# Patient Record
Sex: MALE | Race: WHITE | Employment: OTHER | URBAN - NONMETROPOLITAN AREA
[De-identification: names, ages, dates, MRNs, and addresses within clinical notes are randomized per-mention and may not be internally consistent; named-entity substitution may affect disease eponyms.]

---

## 2018-08-06 ENCOUNTER — HOSPITAL ENCOUNTER (OUTPATIENT)
Dept: PAIN MANAGEMENT | Age: 83
Discharge: HOME | End: 2018-08-06
Payer: MEDICARE

## 2018-08-06 VITALS
TEMPERATURE: 98.5 F | HEART RATE: 89 BPM | HEIGHT: 72 IN | BODY MASS INDEX: 25.73 KG/M2 | SYSTOLIC BLOOD PRESSURE: 130 MMHG | DIASTOLIC BLOOD PRESSURE: 80 MMHG | WEIGHT: 190 LBS | OXYGEN SATURATION: 94 % | RESPIRATION RATE: 18 BRPM

## 2018-08-06 DIAGNOSIS — M79.18 MYOFACIAL MUSCLE PAIN: ICD-10-CM

## 2018-08-06 DIAGNOSIS — G89.29 CHRONIC LEFT SHOULDER PAIN: Primary | ICD-10-CM

## 2018-08-06 DIAGNOSIS — M54.2 CERVICALGIA: ICD-10-CM

## 2018-08-06 DIAGNOSIS — M25.512 CHRONIC LEFT SHOULDER PAIN: Primary | ICD-10-CM

## 2018-08-06 DIAGNOSIS — G89.29 CHRONIC LEFT SHOULDER PAIN: ICD-10-CM

## 2018-08-06 DIAGNOSIS — M25.512 CHRONIC LEFT SHOULDER PAIN: ICD-10-CM

## 2018-08-06 PROCEDURE — 99204 OFFICE O/P NEW MOD 45 MIN: CPT | Performed by: NURSE PRACTITIONER

## 2018-08-06 PROCEDURE — 99204 OFFICE O/P NEW MOD 45 MIN: CPT

## 2018-08-06 RX ORDER — NAPROXEN 500 MG/1
500 TABLET ORAL 2 TIMES DAILY WITH MEALS
COMMUNITY
End: 2018-08-23

## 2018-08-06 RX ORDER — IBUPROFEN 800 MG/1
800 TABLET ORAL 2 TIMES DAILY PRN
COMMUNITY
End: 2018-08-23

## 2018-08-06 RX ORDER — HYDROCODONE BITARTRATE AND ACETAMINOPHEN 7.5; 325 MG/1; MG/1
1 TABLET ORAL EVERY 6 HOURS PRN
COMMUNITY
End: 2018-08-06 | Stop reason: SDUPTHER

## 2018-08-06 ASSESSMENT — PAIN SCALES - GENERAL: PAINLEVEL_OUTOF10: 10

## 2018-08-06 ASSESSMENT — ENCOUNTER SYMPTOMS
WHEEZING: 0
BLURRED VISION: 0
SORE THROAT: 0
SHORTNESS OF BREATH: 0
CONSTIPATION: 0

## 2018-08-06 ASSESSMENT — PAIN DESCRIPTION - ORIENTATION: ORIENTATION: LEFT

## 2018-08-06 ASSESSMENT — PAIN DESCRIPTION - PAIN TYPE: TYPE: CHRONIC PAIN

## 2018-08-06 ASSESSMENT — PAIN DESCRIPTION - FREQUENCY: FREQUENCY: CONTINUOUS

## 2018-08-06 ASSESSMENT — ACTIVITIES OF DAILY LIVING (ADL): EFFECT OF PAIN ON DAILY ACTIVITIES: LIMITS ACTIVITY

## 2018-08-06 ASSESSMENT — PAIN DESCRIPTION - ONSET: ONSET: ON-GOING

## 2018-08-06 ASSESSMENT — PAIN DESCRIPTION - LOCATION: LOCATION: SHOULDER

## 2018-08-06 ASSESSMENT — PAIN DESCRIPTION - PROGRESSION: CLINICAL_PROGRESSION: GRADUALLY WORSENING

## 2018-08-06 NOTE — PROGRESS NOTES
Nursing Admission Record    Current Issues / Falls / ER Visits:  Yes New patient with left shoulder pain that radiates into left arm down into left elbow    Radiology exams received during the last 12 months: Yes XR left shoulder       When 7/2018                                              Where [facility]       Imaging on chart: No         Imaging records requested: No  MRI exams received in the past 2 years:  Yes MRI Left Shoulder @ [facility]  Physical therapy during the last 6 months: No       When: na                                             Where  na  Labs during the last 12 months: Yes    Education Provided:  [x] Review of Navya Novel  [x] Agreement Review  [] Compliance Issues Discussed    [] Cognitive Behavior Needs [x] Exercise [] Review of Test [] Financial Issues  [x] Tobacco/Alcohol Use [x] Teaching [x] New Patient [] Picture Obtained    Physician Plan:  [] Outgoing Referral  [] Pharmacy Consult  [] Test Ordered   [] Obtained Test Results / Consult Notes  [] UDS due at next visit, verified per EPIC      [] Suspected Physical Abuse or Suicide Risk assessed - IF YES COMPLETE QUESTIONS BELOW    If any of the following questions are answered yes - contact attending physician for referral:    Has been considering harming self to escape stress, pain problems? [] YES  [x] NO  Has a suicide plan? [] YES  [x] NO  Has attempted suicide in the past?   [] YES  [x] NO  Has a close friend or family member who committed suicide? [] YES  [x] NO    Patient Referred To :      Additional Notes:    Assessment Completed by:  Electronically signed by Aayush Davalos RN on 8/6/2018 at 9:59 AM

## 2018-08-06 NOTE — PROGRESS NOTES
normal.   Nursing note and vitals reviewed. Assessment:                                                                                                                                        Active Problems:    Chronic left shoulder pain    Myofacial muscle pain    Cervicalgia  Resolved Problems:    * No resolved hospital problems. *      PLAN:  1. Continue Norco 7.5 twice a day-informed patient and family risk of this medication and possibly short-term for this treatment-family assistance at home  2. Obtain MRI left shoulder, possibly for a 2nd opinion orthopedics  3. Trigger point injections cervical ordered  4. Schedule occupational therapy for left shoulder-family has encouraged patient  5. Patient is encouraged to follow with primary care this week related to \"possible blood in stool\"  6. Seek immediate attention for any \"compression\" or neurological changes as discussed with patient and family  7. Ordered compounded cream with Neurontin-indications and usages discussed with patient and family  8. Appointment with this office 6-8 weeks    Discussion:Discussed exam findings and plan of care with patient. Patient agreed with POC and questions were asked and answered. I discussed in great detail exam and findings the patient and family (daughter). Patient and family reports 3 months of the left shoulder pain that is not getting any better. I read ancillary notes from orthopedics and primary care, reviewed cervical spine MRI and left shoulder x-ray report. Family is encouraged to bring CD of images next visit. Patient has seen Dr. Claudetta Eddy with 2 shoulder steroid injections given with some effectiveness and nonsurgical \"left shoulder impingement\". Patient may benefit from repeat left shoulder injection and/or cervical steroid epidural. Patient reports [address] has been better than tramadol, will prescribe Norco 7.5 twice a day while patient completes occupational therapy and trigger point injection.  Patient has been on diclofenac from Dr. Lily Guzman, has been taking Naprosyn and Motrin as well-this was discussed as dangerous and risk to patient and family, daughter does repeat that she seen some blood in his stool on and off for a few months and possibly a year-patient recommended to stop any NSAID mixtures and to seek primary care follow-up this week. We'll reevaluate 6-8 weeks. Activity: discussed exercise as beneficial to pain reduction, encouraged stretching exercise with a focus on torso strengthening. Education Provided:  [x] Review of Willia Comes [x] Agreement Review [x] ORT Calculated- 0 [x] PHQ-9 Reviewed-15  [x] Compliance Issues Discussed [x] Cognitive Behavior Needs [x] Exercise [x] Review of Test [] Financial Issues [x] Teaching  [] Smoking Cessation  [] New Patient Picture Obtained    Controlled Substance Monitoring:  Discussed possible narcotic pain medication side effects, risk of tolerance and/or dependence, and alternative treatments. Discussed the effects of long term narcotic use. Patient encouraged to set daily goals of exercising and if on narcotics to decrease daily narcotic intake. CC:  REJI Nelson - CNP    Thank you for this kind referral and allowing me to participate in your patients care.     REJI Lacy, 8/6/2018 at 12:40 PM

## 2018-08-07 RX ORDER — HYDROCODONE BITARTRATE AND ACETAMINOPHEN 7.5; 325 MG/1; MG/1
1 TABLET ORAL 2 TIMES DAILY PRN
Qty: 60 TABLET | Refills: 0 | Status: SHIPPED | OUTPATIENT
Start: 2018-08-07 | End: 2018-09-07 | Stop reason: SDUPTHER

## 2018-08-07 RX ORDER — EMOLLIENT BASE
CREAM (GRAM) TOPICAL
Qty: 300 G | Refills: 5 | Status: SHIPPED | OUTPATIENT
Start: 2018-08-07 | End: 2019-02-07 | Stop reason: SDUPTHER

## 2018-08-23 ENCOUNTER — HOSPITAL ENCOUNTER (OUTPATIENT)
Dept: PAIN MANAGEMENT | Age: 83
Discharge: HOME | End: 2018-08-23
Payer: MEDICARE

## 2018-08-23 VITALS
SYSTOLIC BLOOD PRESSURE: 130 MMHG | HEIGHT: 72 IN | OXYGEN SATURATION: 94 % | DIASTOLIC BLOOD PRESSURE: 79 MMHG | RESPIRATION RATE: 18 BRPM | HEART RATE: 84 BPM | TEMPERATURE: 97.6 F | WEIGHT: 193 LBS | BODY MASS INDEX: 26.14 KG/M2

## 2018-08-23 PROCEDURE — 2500000003 HC RX 250 WO HCPCS

## 2018-08-23 PROCEDURE — 20553 NJX 1/MLT TRIGGER POINTS 3/>: CPT

## 2018-08-23 PROCEDURE — 20553 NJX 1/MLT TRIGGER POINTS 3/>: CPT | Performed by: NURSE PRACTITIONER

## 2018-08-23 RX ORDER — LIDOCAINE HYDROCHLORIDE 10 MG/ML
INJECTION, SOLUTION EPIDURAL; INFILTRATION; INTRACAUDAL; PERINEURAL
Status: COMPLETED | OUTPATIENT
Start: 2018-08-23 | End: 2018-08-23

## 2018-08-23 RX ORDER — BUPIVACAINE HYDROCHLORIDE 5 MG/ML
INJECTION, SOLUTION EPIDURAL; INTRACAUDAL
Status: COMPLETED | OUTPATIENT
Start: 2018-08-23 | End: 2018-08-23

## 2018-08-23 RX ORDER — CALCIPOTRIENE 50 UG/G
CREAM TOPICAL
Refills: 5 | COMMUNITY
Start: 2018-08-14

## 2018-08-23 RX ADMIN — LIDOCAINE HYDROCHLORIDE 10 ML: 10 INJECTION, SOLUTION EPIDURAL; INFILTRATION; INTRACAUDAL; PERINEURAL at 13:59

## 2018-08-23 RX ADMIN — BUPIVACAINE HYDROCHLORIDE 10 ML: 5 INJECTION, SOLUTION EPIDURAL; INTRACAUDAL at 13:59

## 2018-08-23 ASSESSMENT — PAIN - FUNCTIONAL ASSESSMENT: PAIN_FUNCTIONAL_ASSESSMENT: 0-10

## 2018-09-07 DIAGNOSIS — M25.512 CHRONIC LEFT SHOULDER PAIN: ICD-10-CM

## 2018-09-07 DIAGNOSIS — G89.29 CHRONIC LEFT SHOULDER PAIN: ICD-10-CM

## 2018-09-08 RX ORDER — HYDROCODONE BITARTRATE AND ACETAMINOPHEN 7.5; 325 MG/1; MG/1
1 TABLET ORAL 2 TIMES DAILY PRN
Qty: 60 TABLET | Refills: 0 | Status: SHIPPED | OUTPATIENT
Start: 2018-09-10 | End: 2018-10-10

## 2018-09-19 ENCOUNTER — HOSPITAL ENCOUNTER (OUTPATIENT)
Dept: OCCUPATIONAL THERAPY | Age: 83
Setting detail: THERAPIES SERIES
Discharge: HOME | End: 2018-09-19
Payer: MEDICARE

## 2018-09-19 PROCEDURE — G8984 CARRY CURRENT STATUS: HCPCS

## 2018-09-19 PROCEDURE — G8985 CARRY GOAL STATUS: HCPCS

## 2018-09-19 PROCEDURE — 97165 OT EVAL LOW COMPLEX 30 MIN: CPT

## 2018-09-19 ASSESSMENT — PAIN DESCRIPTION - ORIENTATION: ORIENTATION: LEFT

## 2018-09-19 ASSESSMENT — PAIN DESCRIPTION - LOCATION: LOCATION: SHOULDER

## 2018-09-19 ASSESSMENT — PAIN SCALES - WONG BAKER: WONGBAKER_NUMERICALRESPONSE: 6

## 2018-09-19 NOTE — PROGRESS NOTES
G-codes  Functional Limitation: Carrying, moving and handling objects  Carrying, Moving and Handling Objects Current Status (): At least 80 percent but less than 100 percent impaired, limited or restricted  Carrying, Moving and Handling Objects Goal Status ():  At least 20 percent but less than 40 percent impaired, limited or restricted          Goals  Short term goals  Time Frame for Short term goals: 3 weeks  Short term goal 1: Pt will complete LUE HEP stretching exercises with Min A  Short term goal 2: Pt will be able to don/doff socks Independently with AE PRN  Short term goal 3: Pt will complete LUE Strengthening Program with Min A to impove stability and decrease pain in L shoulder   Long term goals  Long term goal 1: Pt will complete be I LUE HEP stretching exercises   Long term goal 2: Pt will complete be I  LUE Strengthening Program to impove stability and decrease pain in L shoulder        Therapy Time   Individual Concurrent Group Co-treatment   Time In 1300         Time Out 1400         Minutes 60         Timed Code Treatment Minutes: 60 Minutes     Electronically signed by Lars Kimble OT on 9/19/2018 at 2:50 PM    Lars Kimble OT

## 2018-09-24 ENCOUNTER — TELEPHONE (OUTPATIENT)
Dept: PAIN MANAGEMENT | Age: 83
End: 2018-09-24

## 2018-09-25 ENCOUNTER — TELEPHONE (OUTPATIENT)
Dept: PAIN MANAGEMENT | Age: 83
End: 2018-09-25

## 2018-09-27 ENCOUNTER — HOSPITAL ENCOUNTER (OUTPATIENT)
Dept: PAIN MANAGEMENT | Age: 83
Discharge: HOME | End: 2018-09-27
Payer: MEDICARE

## 2018-09-27 VITALS
HEIGHT: 72 IN | SYSTOLIC BLOOD PRESSURE: 157 MMHG | BODY MASS INDEX: 26.14 KG/M2 | WEIGHT: 193 LBS | TEMPERATURE: 97.1 F | DIASTOLIC BLOOD PRESSURE: 82 MMHG | HEART RATE: 75 BPM | OXYGEN SATURATION: 95 % | RESPIRATION RATE: 18 BRPM

## 2018-09-27 DIAGNOSIS — M54.12 CERVICAL RADICULOPATHY: Primary | ICD-10-CM

## 2018-09-27 PROCEDURE — 99213 OFFICE O/P EST LOW 20 MIN: CPT

## 2018-09-27 PROCEDURE — 99214 OFFICE O/P EST MOD 30 MIN: CPT | Performed by: NURSE PRACTITIONER

## 2018-09-27 ASSESSMENT — PAIN DESCRIPTION - FREQUENCY: FREQUENCY: CONTINUOUS

## 2018-09-27 ASSESSMENT — PAIN DESCRIPTION - DIRECTION: RADIATING_TOWARDS: RADIATES DOWN LEFT ARM

## 2018-09-27 ASSESSMENT — ENCOUNTER SYMPTOMS
CONSTIPATION: 0
SORE THROAT: 0
SHORTNESS OF BREATH: 0
BLURRED VISION: 0
WHEEZING: 0
BACK PAIN: 1

## 2018-09-27 ASSESSMENT — PAIN DESCRIPTION - PROGRESSION: CLINICAL_PROGRESSION: NOT CHANGED

## 2018-09-27 ASSESSMENT — PAIN DESCRIPTION - ONSET: ONSET: ON-GOING

## 2018-09-27 ASSESSMENT — PAIN DESCRIPTION - ORIENTATION: ORIENTATION: LEFT

## 2018-09-27 ASSESSMENT — ACTIVITIES OF DAILY LIVING (ADL): EFFECT OF PAIN ON DAILY ACTIVITIES: LIMITS ACTIVITIES

## 2018-09-27 ASSESSMENT — PAIN DESCRIPTION - LOCATION: LOCATION: NECK;SHOULDER

## 2018-09-27 ASSESSMENT — PAIN SCALES - GENERAL: PAINLEVEL_OUTOF10: 7

## 2018-09-27 ASSESSMENT — PAIN DESCRIPTION - PAIN TYPE: TYPE: CHRONIC PAIN

## 2018-09-27 NOTE — PROGRESS NOTES
[facility] Physical & Pain Medicine  Office Note    Patient Name: Susi Mak    MR #: [id number]    Account #: [de-identified]    : 1935    Age: [de-identified] y.o. Sex: male    Date: 2018    PCP: REJI Castle NP    Chief Complaint  Chief Complaint   Patient presents with    Shoulder Pain     left and down left arm    Neck Pain    Back Pain       History of Present Illness:     Susi Mak is a [de-identified] y.o. male who presents to the office for month chronic neck, left shoulder pain. I have read previous pain management noted. follow-up of cervical TPI  injections on 2018. Injections were 75% effective for 1 week. Pt reports success with OT for left shoulder currently- attempting to obtain more OT therapy. Pt reports seen PCP for \"blood in stool\" and being eval. Pt reports Norco effective and compounded cream. Daughter- Ashia Ovalles brought CD of cervical MRI. I have reviewed and discussed. Shoulder Pain   This is a chronic problem. The current episode started more than 1 year ago. The problem occurs daily. The problem has been gradually improving (left shoulder improving with OT). Associated symptoms include myalgias and neck pain. Pertinent negatives include no chest pain, chills, fever, rash or sore throat. The symptoms are aggravated by exertion. He has tried rest, sleep, NSAIDs, oral narcotics and heat for the symptoms. The treatment provided mild relief. Neck Pain    This is a chronic problem. The current episode started more than 1 year ago. The problem occurs daily. The problem has been unchanged. The pain is present in the left side, midline and right side. The quality of the pain is described as shooting and aching (Left arm pain - C6 dermatone). The pain is at a severity of 4/10. The pain is mild. The symptoms are aggravated by twisting and bending. The pain is same all the time. Stiffness is present all day. Pertinent negatives include no chest pain or fever.

## 2018-09-28 ENCOUNTER — TELEPHONE (OUTPATIENT)
Dept: NEUROSURGERY | Age: 83
End: 2018-09-28

## 2018-10-11 ENCOUNTER — OFFICE VISIT (OUTPATIENT)
Dept: NEUROSURGERY | Age: 83
End: 2018-10-11
Payer: MEDICARE

## 2018-10-11 VITALS
SYSTOLIC BLOOD PRESSURE: 145 MMHG | BODY MASS INDEX: 25.73 KG/M2 | WEIGHT: 190 LBS | DIASTOLIC BLOOD PRESSURE: 81 MMHG | OXYGEN SATURATION: 94 % | HEART RATE: 79 BPM | HEIGHT: 72 IN

## 2018-10-11 DIAGNOSIS — G89.29 CHRONIC LEFT SHOULDER PAIN: ICD-10-CM

## 2018-10-11 DIAGNOSIS — M50.30 DDD (DEGENERATIVE DISC DISEASE), CERVICAL: ICD-10-CM

## 2018-10-11 DIAGNOSIS — M25.512 CHRONIC LEFT SHOULDER PAIN: ICD-10-CM

## 2018-10-11 DIAGNOSIS — M48.02 FORAMINAL STENOSIS OF CERVICAL REGION: Primary | ICD-10-CM

## 2018-10-11 DIAGNOSIS — M54.2 NECK PAIN: ICD-10-CM

## 2018-10-11 PROCEDURE — 1101F PT FALLS ASSESS-DOCD LE1/YR: CPT | Performed by: NEUROLOGICAL SURGERY

## 2018-10-11 PROCEDURE — G8484 FLU IMMUNIZE NO ADMIN: HCPCS | Performed by: NEUROLOGICAL SURGERY

## 2018-10-11 PROCEDURE — 4004F PT TOBACCO SCREEN RCVD TLK: CPT | Performed by: NEUROLOGICAL SURGERY

## 2018-10-11 PROCEDURE — G8427 DOCREV CUR MEDS BY ELIG CLIN: HCPCS | Performed by: NEUROLOGICAL SURGERY

## 2018-10-11 PROCEDURE — 99203 OFFICE O/P NEW LOW 30 MIN: CPT | Performed by: NEUROLOGICAL SURGERY

## 2018-10-11 PROCEDURE — 4040F PNEUMOC VAC/ADMIN/RCVD: CPT | Performed by: NEUROLOGICAL SURGERY

## 2018-10-11 PROCEDURE — 1123F ACP DISCUSS/DSCN MKR DOCD: CPT | Performed by: NEUROLOGICAL SURGERY

## 2018-10-11 PROCEDURE — G8419 CALC BMI OUT NRM PARAM NOF/U: HCPCS | Performed by: NEUROLOGICAL SURGERY

## 2018-10-11 RX ORDER — HYDROCODONE BITARTRATE AND ACETAMINOPHEN 7.5; 325 MG/1; MG/1
1 TABLET ORAL EVERY 6 HOURS PRN
COMMUNITY
End: 2019-02-28 | Stop reason: SDUPTHER

## 2018-10-11 ASSESSMENT — ENCOUNTER SYMPTOMS
SPUTUM PRODUCTION: 0
COUGH: 0
SHORTNESS OF BREATH: 1
BACK PAIN: 1
STRIDOR: 0
SORE THROAT: 0
WHEEZING: 0
EYES NEGATIVE: 1
SINUS PAIN: 0
GASTROINTESTINAL NEGATIVE: 1
HEMOPTYSIS: 0

## 2018-10-29 DIAGNOSIS — M25.512 CHRONIC LEFT SHOULDER PAIN: Primary | ICD-10-CM

## 2018-10-29 DIAGNOSIS — G89.29 CHRONIC LEFT SHOULDER PAIN: Primary | ICD-10-CM

## 2018-10-31 ENCOUNTER — HOSPITAL ENCOUNTER (OUTPATIENT)
Dept: PAIN MANAGEMENT | Age: 83
Discharge: HOME | End: 2018-10-31
Payer: MEDICARE

## 2018-10-31 VITALS
SYSTOLIC BLOOD PRESSURE: 131 MMHG | OXYGEN SATURATION: 94 % | TEMPERATURE: 97.8 F | DIASTOLIC BLOOD PRESSURE: 69 MMHG | RESPIRATION RATE: 20 BRPM | HEART RATE: 97 BPM

## 2018-10-31 DIAGNOSIS — R52 PAIN MANAGEMENT: ICD-10-CM

## 2018-10-31 PROCEDURE — 62321 NJX INTERLAMINAR CRV/THRC: CPT

## 2018-10-31 PROCEDURE — 2500000003 HC RX 250 WO HCPCS

## 2018-10-31 PROCEDURE — 3209999900 FLUORO FOR SURGICAL PROCEDURES

## 2018-10-31 PROCEDURE — 2580000003 HC RX 258

## 2018-10-31 PROCEDURE — 6360000002 HC RX W HCPCS

## 2018-10-31 RX ORDER — LIDOCAINE HYDROCHLORIDE 10 MG/ML
INJECTION, SOLUTION EPIDURAL; INFILTRATION; INTRACAUDAL; PERINEURAL
Status: COMPLETED | OUTPATIENT
Start: 2018-10-31 | End: 2018-10-31

## 2018-10-31 RX ORDER — METHYLPREDNISOLONE ACETATE 80 MG/ML
INJECTION, SUSPENSION INTRA-ARTICULAR; INTRALESIONAL; INTRAMUSCULAR; SOFT TISSUE
Status: COMPLETED | OUTPATIENT
Start: 2018-10-31 | End: 2018-10-31

## 2018-10-31 RX ORDER — HYDROCODONE BITARTRATE AND ACETAMINOPHEN 7.5; 325 MG/1; MG/1
1 TABLET ORAL 2 TIMES DAILY PRN
Qty: 60 TABLET | Refills: 0 | Status: SHIPPED | OUTPATIENT
Start: 2018-10-31 | End: 2018-11-30

## 2018-10-31 RX ORDER — 0.9 % SODIUM CHLORIDE 0.9 %
VIAL (ML) INJECTION
Status: COMPLETED | OUTPATIENT
Start: 2018-10-31 | End: 2018-10-31

## 2018-10-31 RX ADMIN — METHYLPREDNISOLONE ACETATE 80 MG: 80 INJECTION, SUSPENSION INTRA-ARTICULAR; INTRALESIONAL; INTRAMUSCULAR; SOFT TISSUE at 08:02

## 2018-10-31 RX ADMIN — Medication 3 ML: at 08:03

## 2018-10-31 RX ADMIN — LIDOCAINE HYDROCHLORIDE 5 ML: 10 INJECTION, SOLUTION EPIDURAL; INFILTRATION; INTRACAUDAL; PERINEURAL at 08:02

## 2018-10-31 RX ADMIN — Medication 2 ML: at 08:02

## 2018-10-31 ASSESSMENT — PAIN - FUNCTIONAL ASSESSMENT: PAIN_FUNCTIONAL_ASSESSMENT: 0-10

## 2018-11-02 ENCOUNTER — TELEPHONE (OUTPATIENT)
Dept: PAIN MANAGEMENT | Age: 83
End: 2018-11-02

## 2018-11-13 ENCOUNTER — TELEPHONE (OUTPATIENT)
Dept: PAIN MANAGEMENT | Age: 83
End: 2018-11-13

## 2018-12-14 ENCOUNTER — HOSPITAL ENCOUNTER (OUTPATIENT)
Dept: PAIN MANAGEMENT | Age: 83
Discharge: HOME | End: 2018-12-14
Payer: MEDICARE

## 2018-12-14 VITALS
HEIGHT: 72 IN | WEIGHT: 190 LBS | BODY MASS INDEX: 25.73 KG/M2 | RESPIRATION RATE: 18 BRPM | SYSTOLIC BLOOD PRESSURE: 109 MMHG | TEMPERATURE: 98.2 F | HEART RATE: 98 BPM | DIASTOLIC BLOOD PRESSURE: 77 MMHG | OXYGEN SATURATION: 96 %

## 2018-12-14 DIAGNOSIS — M54.2 CERVICALGIA: Primary | ICD-10-CM

## 2018-12-14 DIAGNOSIS — M54.12 CERVICAL RADICULAR PAIN: ICD-10-CM

## 2018-12-14 PROCEDURE — 99214 OFFICE O/P EST MOD 30 MIN: CPT | Performed by: NURSE PRACTITIONER

## 2018-12-14 PROCEDURE — 99213 OFFICE O/P EST LOW 20 MIN: CPT

## 2018-12-14 RX ORDER — GABAPENTIN 100 MG/1
100 CAPSULE ORAL 2 TIMES DAILY
Qty: 60 CAPSULE | Refills: 0 | Status: SHIPPED | OUTPATIENT
Start: 2018-12-14 | End: 2019-03-05 | Stop reason: ALTCHOICE

## 2018-12-14 RX ORDER — DOXYCYCLINE HYCLATE 100 MG/1
1 CAPSULE ORAL 2 TIMES DAILY
COMMUNITY
Start: 2018-12-07 | End: 2019-06-18 | Stop reason: ALTCHOICE

## 2018-12-14 RX ORDER — HYDROCODONE BITARTRATE AND ACETAMINOPHEN 7.5; 325 MG/1; MG/1
1 TABLET ORAL 2 TIMES DAILY PRN
Qty: 60 TABLET | Refills: 0 | Status: SHIPPED | OUTPATIENT
Start: 2018-12-18 | End: 2019-01-17 | Stop reason: SDUPTHER

## 2018-12-14 ASSESSMENT — PAIN DESCRIPTION - FREQUENCY: FREQUENCY: CONTINUOUS

## 2018-12-14 ASSESSMENT — PAIN DESCRIPTION - ONSET: ONSET: ON-GOING

## 2018-12-14 ASSESSMENT — ENCOUNTER SYMPTOMS
BLURRED VISION: 0
WHEEZING: 0
SORE THROAT: 0
CONSTIPATION: 0
SHORTNESS OF BREATH: 0

## 2018-12-14 ASSESSMENT — PAIN SCALES - GENERAL: PAINLEVEL_OUTOF10: 7

## 2018-12-14 ASSESSMENT — ACTIVITIES OF DAILY LIVING (ADL): EFFECT OF PAIN ON DAILY ACTIVITIES: LIMITS ACTIVITY

## 2018-12-14 ASSESSMENT — PAIN DESCRIPTION - LOCATION: LOCATION: NECK;SHOULDER

## 2018-12-14 ASSESSMENT — PAIN DESCRIPTION - PROGRESSION: CLINICAL_PROGRESSION: NOT CHANGED

## 2018-12-14 ASSESSMENT — PAIN DESCRIPTION - PAIN TYPE: TYPE: CHRONIC PAIN

## 2018-12-14 ASSESSMENT — PAIN DESCRIPTION - DIRECTION: RADIATING_TOWARDS: INTO LEFT ARM

## 2018-12-14 ASSESSMENT — PAIN DESCRIPTION - ORIENTATION: ORIENTATION: LEFT;UPPER

## 2018-12-14 NOTE — PROGRESS NOTES
diaphoretic. Psychiatric: He has a normal mood and affect. His behavior is normal. Judgment and thought content normal.   Nursing note and vitals reviewed. Recent Imaging:     MRI cervical spine without contrast 6/1/2018  Impression mild degenerative change in C3-C4 with canal stenosis mild    Assessment:  Principal Problem:    Cervical radicular pain  Active Problems:    Chronic left shoulder pain    Myofascial muscle pain    Cervicalgia  Resolved Problems:    * No resolved hospital problems. *      Plan:  1. Refill Norco 7.5mg BID when due-no side effects  2. I have re-odered KAITLYNN c6-c7 Dr. Jasiel Cuellar without sedation  3. I have read notes via  Dr. Johnson Stewart  4. Continue OT for left shoulder pain- Daughter will call for continue appts. 5. Continue home stretching and exercises. 6. Restart Neurontin 100mg bid - call for refill-  No Se. [x] Will return to the office in   [] 1 month   [] 4 - 6 weeks   [] 2 months   [] 3 months for:  [] Planned Procedure   [x] Procedure Follow-up   [] Office Visit  [] Medication Changes    Discussion: Discussed exam findings and plan of care with patient. Patient agreed with POC and questions were asked and answered. I discussed examination findings with patient and daughter. Patient reports Norco 7.5 mg effective for daily activity and range of motion exercises-no side effects. Continues occupational therapy for left shoulder pain with success. We'll repeat cervical epidural steroid injection C6-C7. Continue home stretching exercises. Restart Neurontin 100 mg twice a day no side effects and past. Patient and daughter very pleased with today's visit. Activity: discussed exercise as beneficial to pain reduction, encouraged stretching exercise with a focus on torso strengthening.     Education Provided:  [x] Review of Isadore [place] [x] Agreement Review [] ORT Score  [] Review of Test  [x] Compliance Issues Discussed [] Cognitive Behavior Needs [x] Exercise  []

## 2018-12-20 PROBLEM — M54.12 CERVICAL RADICULAR PAIN: Status: ACTIVE | Noted: 2018-12-20

## 2018-12-20 ASSESSMENT — ENCOUNTER SYMPTOMS
VOMITING: 0
PHOTOPHOBIA: 0

## 2019-01-17 DIAGNOSIS — M54.2 CERVICALGIA: ICD-10-CM

## 2019-01-17 RX ORDER — HYDROCODONE BITARTRATE AND ACETAMINOPHEN 7.5; 325 MG/1; MG/1
1 TABLET ORAL 2 TIMES DAILY PRN
Qty: 60 TABLET | Refills: 0 | Status: SHIPPED | OUTPATIENT
Start: 2019-01-18 | End: 2019-02-17

## 2019-01-25 ENCOUNTER — HOSPITAL ENCOUNTER (OUTPATIENT)
Dept: PAIN MANAGEMENT | Age: 84
Discharge: HOME | End: 2019-01-25
Payer: MEDICARE

## 2019-01-25 VITALS
RESPIRATION RATE: 20 BRPM | HEART RATE: 81 BPM | SYSTOLIC BLOOD PRESSURE: 156 MMHG | OXYGEN SATURATION: 95 % | DIASTOLIC BLOOD PRESSURE: 86 MMHG | TEMPERATURE: 97.2 F

## 2019-01-25 DIAGNOSIS — M54.12 CERVICAL NEURALGIA: ICD-10-CM

## 2019-01-25 PROCEDURE — 2500000003 HC RX 250 WO HCPCS

## 2019-01-25 PROCEDURE — 6360000002 HC RX W HCPCS

## 2019-01-25 PROCEDURE — 2580000003 HC RX 258

## 2019-01-25 PROCEDURE — 3209999900 FLUORO FOR SURGICAL PROCEDURES

## 2019-01-25 PROCEDURE — 62321 NJX INTERLAMINAR CRV/THRC: CPT

## 2019-01-25 RX ORDER — LIDOCAINE HYDROCHLORIDE 10 MG/ML
INJECTION, SOLUTION EPIDURAL; INFILTRATION; INTRACAUDAL; PERINEURAL
Status: COMPLETED | OUTPATIENT
Start: 2019-01-25 | End: 2019-01-25

## 2019-01-25 RX ORDER — 0.9 % SODIUM CHLORIDE 0.9 %
VIAL (ML) INJECTION
Status: COMPLETED | OUTPATIENT
Start: 2019-01-25 | End: 2019-01-25

## 2019-01-25 RX ORDER — METHYLPREDNISOLONE ACETATE 80 MG/ML
INJECTION, SUSPENSION INTRA-ARTICULAR; INTRALESIONAL; INTRAMUSCULAR; SOFT TISSUE
Status: COMPLETED | OUTPATIENT
Start: 2019-01-25 | End: 2019-01-25

## 2019-01-25 RX ADMIN — LIDOCAINE HYDROCHLORIDE 5 ML: 10 INJECTION, SOLUTION EPIDURAL; INFILTRATION; INTRACAUDAL; PERINEURAL at 10:23

## 2019-01-25 RX ADMIN — Medication 5 ML: at 10:23

## 2019-01-25 RX ADMIN — METHYLPREDNISOLONE ACETATE 80 MG: 80 INJECTION, SUSPENSION INTRA-ARTICULAR; INTRALESIONAL; INTRAMUSCULAR; SOFT TISSUE at 10:23

## 2019-01-25 ASSESSMENT — PAIN - FUNCTIONAL ASSESSMENT: PAIN_FUNCTIONAL_ASSESSMENT: 0-10

## 2019-01-25 ASSESSMENT — PAIN SCALES - GENERAL: PAINLEVEL_OUTOF10: 6

## 2019-01-28 ENCOUNTER — TELEPHONE (OUTPATIENT)
Dept: PAIN MANAGEMENT | Age: 84
End: 2019-01-28

## 2019-01-28 NOTE — TELEPHONE ENCOUNTER
I called Sherita Oseguera as a [facility] level of C6-7 that he received on 1/25/19. I spoke with his daughter and she reported that it is a little bit better. Sherita Oseguera said that he guesses that Dr. Nory Soto was in the correct spot.

## 2019-02-07 DIAGNOSIS — G89.29 CHRONIC LEFT SHOULDER PAIN: ICD-10-CM

## 2019-02-07 DIAGNOSIS — M25.512 CHRONIC LEFT SHOULDER PAIN: ICD-10-CM

## 2019-02-07 RX ORDER — EMOLLIENT BASE
CREAM (GRAM) TOPICAL
Qty: 300 G | Refills: 5 | Status: SHIPPED | OUTPATIENT
Start: 2019-02-07 | End: 2019-07-30 | Stop reason: SDUPTHER

## 2019-02-28 DIAGNOSIS — G89.29 CHRONIC LEFT SHOULDER PAIN: Primary | ICD-10-CM

## 2019-02-28 DIAGNOSIS — M25.512 CHRONIC LEFT SHOULDER PAIN: Primary | ICD-10-CM

## 2019-03-03 RX ORDER — HYDROCODONE BITARTRATE AND ACETAMINOPHEN 7.5; 325 MG/1; MG/1
1 TABLET ORAL 2 TIMES DAILY PRN
Qty: 60 TABLET | Refills: 0 | Status: SHIPPED | OUTPATIENT
Start: 2019-03-03 | End: 2019-03-05 | Stop reason: SDUPTHER

## 2019-03-05 ENCOUNTER — HOSPITAL ENCOUNTER (OUTPATIENT)
Dept: PAIN MANAGEMENT | Age: 84
Discharge: HOME | End: 2019-03-05
Payer: MEDICARE

## 2019-03-05 VITALS
SYSTOLIC BLOOD PRESSURE: 142 MMHG | TEMPERATURE: 97.5 F | DIASTOLIC BLOOD PRESSURE: 87 MMHG | HEART RATE: 86 BPM | BODY MASS INDEX: 25.73 KG/M2 | RESPIRATION RATE: 18 BRPM | OXYGEN SATURATION: 93 % | HEIGHT: 72 IN | WEIGHT: 190 LBS

## 2019-03-05 DIAGNOSIS — G89.29 CHRONIC LEFT SHOULDER PAIN: ICD-10-CM

## 2019-03-05 DIAGNOSIS — M25.512 CHRONIC LEFT SHOULDER PAIN: ICD-10-CM

## 2019-03-05 DIAGNOSIS — M54.12 CERVICAL RADICULAR PAIN: Primary | ICD-10-CM

## 2019-03-05 PROCEDURE — 99214 OFFICE O/P EST MOD 30 MIN: CPT

## 2019-03-05 PROCEDURE — 99214 OFFICE O/P EST MOD 30 MIN: CPT | Performed by: NURSE PRACTITIONER

## 2019-03-05 RX ORDER — GABAPENTIN 100 MG/1
100 CAPSULE ORAL 2 TIMES DAILY
Qty: 60 CAPSULE | Refills: 1 | Status: SHIPPED | OUTPATIENT
Start: 2019-03-05 | End: 2019-05-16 | Stop reason: SDUPTHER

## 2019-03-05 ASSESSMENT — PAIN DESCRIPTION - DESCRIPTORS: DESCRIPTORS: CONSTANT

## 2019-03-05 ASSESSMENT — PAIN DESCRIPTION - ORIENTATION: ORIENTATION: LEFT

## 2019-03-05 ASSESSMENT — PAIN DESCRIPTION - FREQUENCY: FREQUENCY: CONTINUOUS

## 2019-03-05 ASSESSMENT — PAIN SCALES - GENERAL: PAINLEVEL_OUTOF10: 8

## 2019-03-05 ASSESSMENT — PAIN - FUNCTIONAL ASSESSMENT: PAIN_FUNCTIONAL_ASSESSMENT: PREVENTS OR INTERFERES SOME ACTIVE ACTIVITIES AND ADLS

## 2019-03-05 ASSESSMENT — ACTIVITIES OF DAILY LIVING (ADL): EFFECT OF PAIN ON DAILY ACTIVITIES: LIMITS ACTIVITY

## 2019-03-05 ASSESSMENT — PAIN DESCRIPTION - PROGRESSION: CLINICAL_PROGRESSION: NOT CHANGED

## 2019-03-05 ASSESSMENT — ENCOUNTER SYMPTOMS
EYE PAIN: 0
CONSTIPATION: 0
WHEEZING: 0
EYE REDNESS: 0
SORE THROAT: 0
SHORTNESS OF BREATH: 0

## 2019-03-05 ASSESSMENT — PAIN DESCRIPTION - LOCATION: LOCATION: SHOULDER;ARM

## 2019-03-05 ASSESSMENT — PAIN DESCRIPTION - PAIN TYPE: TYPE: CHRONIC PAIN

## 2019-03-05 ASSESSMENT — PAIN DESCRIPTION - ONSET: ONSET: ON-GOING

## 2019-03-06 RX ORDER — HYDROCODONE BITARTRATE AND ACETAMINOPHEN 7.5; 325 MG/1; MG/1
1 TABLET ORAL 2 TIMES DAILY PRN
Qty: 60 TABLET | Refills: 0 | Status: SHIPPED | OUTPATIENT
Start: 2019-04-03 | End: 2019-04-25 | Stop reason: SDUPTHER

## 2019-03-11 ASSESSMENT — ENCOUNTER SYMPTOMS
DIARRHEA: 0
COUGH: 1

## 2019-04-25 DIAGNOSIS — M25.512 CHRONIC LEFT SHOULDER PAIN: ICD-10-CM

## 2019-04-25 DIAGNOSIS — G89.29 CHRONIC LEFT SHOULDER PAIN: ICD-10-CM

## 2019-04-26 ENCOUNTER — HOSPITAL ENCOUNTER (OUTPATIENT)
Dept: PAIN MANAGEMENT | Age: 84
Discharge: HOME | End: 2019-04-26
Payer: MEDICARE

## 2019-04-26 VITALS
HEART RATE: 80 BPM | RESPIRATION RATE: 20 BRPM | TEMPERATURE: 97.3 F | DIASTOLIC BLOOD PRESSURE: 65 MMHG | OXYGEN SATURATION: 94 % | SYSTOLIC BLOOD PRESSURE: 133 MMHG

## 2019-04-26 DIAGNOSIS — M54.12 CERVICAL NEURALGIA: ICD-10-CM

## 2019-04-26 PROCEDURE — 2500000003 HC RX 250 WO HCPCS

## 2019-04-26 PROCEDURE — 2580000003 HC RX 258

## 2019-04-26 PROCEDURE — 3209999900 FLUORO FOR SURGICAL PROCEDURES

## 2019-04-26 PROCEDURE — 62321 NJX INTERLAMINAR CRV/THRC: CPT

## 2019-04-26 PROCEDURE — 6360000002 HC RX W HCPCS

## 2019-04-26 RX ORDER — METHYLPREDNISOLONE ACETATE 80 MG/ML
INJECTION, SUSPENSION INTRA-ARTICULAR; INTRALESIONAL; INTRAMUSCULAR; SOFT TISSUE
Status: COMPLETED | OUTPATIENT
Start: 2019-04-26 | End: 2019-04-26

## 2019-04-26 RX ORDER — 0.9 % SODIUM CHLORIDE 0.9 %
VIAL (ML) INJECTION
Status: COMPLETED | OUTPATIENT
Start: 2019-04-26 | End: 2019-04-26

## 2019-04-26 RX ORDER — LIDOCAINE HYDROCHLORIDE 10 MG/ML
INJECTION, SOLUTION EPIDURAL; INFILTRATION; INTRACAUDAL; PERINEURAL
Status: COMPLETED | OUTPATIENT
Start: 2019-04-26 | End: 2019-04-26

## 2019-04-26 RX ADMIN — LIDOCAINE HYDROCHLORIDE 5 ML: 10 INJECTION, SOLUTION EPIDURAL; INFILTRATION; INTRACAUDAL; PERINEURAL at 10:41

## 2019-04-26 RX ADMIN — Medication 5 ML: at 10:41

## 2019-04-26 RX ADMIN — METHYLPREDNISOLONE ACETATE 80 MG: 80 INJECTION, SUSPENSION INTRA-ARTICULAR; INTRALESIONAL; INTRAMUSCULAR; SOFT TISSUE at 10:41

## 2019-04-26 ASSESSMENT — PAIN - FUNCTIONAL ASSESSMENT: PAIN_FUNCTIONAL_ASSESSMENT: 0-10

## 2019-04-29 ENCOUNTER — TELEPHONE (OUTPATIENT)
Dept: PAIN MANAGEMENT | Age: 84
End: 2019-04-29

## 2019-04-29 NOTE — TELEPHONE ENCOUNTER
I called Romy Eli as a follow up from his Cervical epidural steroid injection C6-7 that he had on 4-26-19 with Dr. Bethel Noyola. I spoke with his wife due to him being hard of hearing. Addison's wife stated that it has seemed to help some and that he is able to walk straighter. She did state that he was experiencing some dizziness. Addison pain score went from a 9 to a 6.

## 2019-05-01 RX ORDER — HYDROCODONE BITARTRATE AND ACETAMINOPHEN 7.5; 325 MG/1; MG/1
1 TABLET ORAL 2 TIMES DAILY PRN
Qty: 60 TABLET | Refills: 0 | Status: SHIPPED | OUTPATIENT
Start: 2019-05-03 | End: 2019-06-03 | Stop reason: SDUPTHER

## 2019-05-16 DIAGNOSIS — M54.12 CERVICAL RADICULAR PAIN: ICD-10-CM

## 2019-05-17 RX ORDER — GABAPENTIN 100 MG/1
100 CAPSULE ORAL 2 TIMES DAILY
Qty: 60 CAPSULE | Refills: 2 | Status: SHIPPED | OUTPATIENT
Start: 2019-05-17 | End: 2020-07-15

## 2019-06-03 DIAGNOSIS — G89.29 CHRONIC LEFT SHOULDER PAIN: ICD-10-CM

## 2019-06-03 DIAGNOSIS — M25.512 CHRONIC LEFT SHOULDER PAIN: ICD-10-CM

## 2019-06-04 RX ORDER — HYDROCODONE BITARTRATE AND ACETAMINOPHEN 7.5; 325 MG/1; MG/1
1 TABLET ORAL 2 TIMES DAILY PRN
Qty: 60 TABLET | Refills: 0 | Status: SHIPPED | OUTPATIENT
Start: 2019-06-05 | End: 2019-08-05

## 2019-06-18 ENCOUNTER — HOSPITAL ENCOUNTER (OUTPATIENT)
Dept: PAIN MANAGEMENT | Age: 84
Discharge: HOME | End: 2019-06-18
Payer: MEDICARE

## 2019-06-18 VITALS
HEIGHT: 72 IN | TEMPERATURE: 97.4 F | WEIGHT: 182.38 LBS | SYSTOLIC BLOOD PRESSURE: 165 MMHG | OXYGEN SATURATION: 97 % | RESPIRATION RATE: 16 BRPM | HEART RATE: 68 BPM | BODY MASS INDEX: 24.7 KG/M2 | DIASTOLIC BLOOD PRESSURE: 86 MMHG

## 2019-06-18 DIAGNOSIS — G89.29 CHRONIC LEFT SHOULDER PAIN: Primary | ICD-10-CM

## 2019-06-18 DIAGNOSIS — M25.512 CHRONIC LEFT SHOULDER PAIN: Primary | ICD-10-CM

## 2019-06-18 PROCEDURE — 99214 OFFICE O/P EST MOD 30 MIN: CPT | Performed by: NURSE PRACTITIONER

## 2019-06-18 PROCEDURE — 99213 OFFICE O/P EST LOW 20 MIN: CPT

## 2019-06-18 RX ORDER — GABAPENTIN 100 MG/1
100 CAPSULE ORAL 2 TIMES DAILY
Qty: 60 CAPSULE | Refills: 1 | Status: SHIPPED | OUTPATIENT
Start: 2019-06-18 | End: 2019-09-09 | Stop reason: SDUPTHER

## 2019-06-18 RX ORDER — HYDROCODONE BITARTRATE AND ACETAMINOPHEN 7.5; 325 MG/1; MG/1
1 TABLET ORAL EVERY 8 HOURS PRN
Qty: 60 TABLET | Refills: 0 | Status: SHIPPED | OUTPATIENT
Start: 2019-07-05 | End: 2019-08-05 | Stop reason: SDUPTHER

## 2019-06-18 ASSESSMENT — ENCOUNTER SYMPTOMS
EYE PAIN: 0
EYE REDNESS: 0
CONSTIPATION: 0
SORE THROAT: 0
WHEEZING: 0
SHORTNESS OF BREATH: 0
ABDOMINAL PAIN: 0
BACK PAIN: 1

## 2019-06-18 ASSESSMENT — PAIN DESCRIPTION - ONSET: ONSET: ON-GOING

## 2019-06-18 ASSESSMENT — PAIN SCALES - GENERAL: PAINLEVEL_OUTOF10: 8

## 2019-06-18 ASSESSMENT — PAIN DESCRIPTION - LOCATION: LOCATION: ARM;BACK

## 2019-06-18 ASSESSMENT — PAIN - FUNCTIONAL ASSESSMENT: PAIN_FUNCTIONAL_ASSESSMENT: PREVENTS OR INTERFERES SOME ACTIVE ACTIVITIES AND ADLS

## 2019-06-18 ASSESSMENT — PAIN DESCRIPTION - FREQUENCY: FREQUENCY: CONTINUOUS

## 2019-06-18 ASSESSMENT — PAIN DESCRIPTION - PROGRESSION: CLINICAL_PROGRESSION: GRADUALLY WORSENING

## 2019-06-18 ASSESSMENT — PAIN DESCRIPTION - PAIN TYPE: TYPE: CHRONIC PAIN

## 2019-06-18 ASSESSMENT — PAIN DESCRIPTION - DESCRIPTORS: DESCRIPTORS: ACHING

## 2019-06-18 NOTE — PROGRESS NOTES
[facility] Physical & Pain Medicine  Office Note    Patient Name: Arlene Perez    MR #: [id number]    Account #: [de-identified]    : 1935    Age: 82 y.o. Sex: male    Date: 2019    PCP: REJI Goldberg NP    Chief Complaint:   Chief Complaint   Patient presents with    Arm Pain     left upper       History of Present Illness:     Arlene Perez is a 82 y.o. male who presents to the office for for cervical and left c5-c6 dermatone pain. norco and gabapentin effective. Daughter \" Deysi\" in room . Reports she can tell success (mobility and pain control) from cervical epidural Norco and compounded cream.    Past Visit HPI: I have read last pain management note    Current PE- finished bushogging daughter states    Past PE.7    ORT Score: 0    PHQ-9 Score: 11    Current Pain Assessment  Pain Assessment  Pain Assessment: 0-10  Pain Level: 8  Patient's Stated Pain Goal: No pain  Pain Type: Chronic pain  Pain Location: Arm, Back  Pain Descriptors: Aching  Pain Frequency: Continuous  Pain Onset: On-going  Clinical Progression: Gradually worsening  Functional Pain Assessment: Prevents or interferes some active activities and ADLs    Employment: retired     Past Medical Histoy  Past Medical History:   Diagnosis Date    Arthritis     Shoulder impingement     left       Surgery History  Past Surgical History:   Procedure Laterality Date    FINGER SURGERY Right         Family History  family history is not on file. Social History    Social History     Tobacco Use    Smoking status: Former Smoker    Smokeless tobacco: Current User     Types: Chew   Substance Use Topics    Alcohol use: No       Allergies  Patient has no known allergies. Current Medications  Current Outpatient Medications   Medication Sig Dispense Refill    gabapentin (NEURONTIN) 100 MG capsule Take 1 capsule by mouth 2 times daily for 180 days.  Intended supply: 90 days 60 capsule 1    [START ON 7/5/2019] HYDROcodone-acetaminophen (NORCO) 7.5-325 MG per tablet Take 1 tablet by mouth every 8 hours as needed for Pain for up to 30 days. Intended supply: 30 days 60 tablet 0    HYDROcodone-acetaminophen (NORCO) 7.5-325 MG per tablet Take 1 tablet by mouth 2 times daily as needed for Pain for up to 30 days. (may fill 6/5/19) 60 tablet 0    gabapentin (NEURONTIN) 100 MG capsule Take 1 capsule by mouth 2 times daily for 30 days. 60 capsule 2    Cream Base CREA [place] A Pain Cream #4 Add Gabapentin 6% Apply 1-2 pumps to affected area 3-4 times a day 300 g 5    calcipotriene (DOVONEX) 0.005 % cream   5    diclofenac (VOLTAREN) 50 MG EC tablet Take 1 tablet by mouth 2 times daily       No current facility-administered medications for this encounter. Review of Systems:  Review of Systems   Constitutional: Negative for chills and fever. HENT: Negative for nosebleeds and sore throat. Eyes: Negative for pain and redness. Respiratory: Negative for shortness of breath and wheezing. Cardiovascular: Negative for chest pain. Gastrointestinal: Negative for abdominal pain and constipation. Genitourinary: Negative for dysuria and hematuria. Musculoskeletal: Positive for back pain, myalgias and neck pain. Skin: Negative for rash. Neurological: Negative for seizures and syncope. Hematological: Does not bruise/bleed easily. Psychiatric/Behavioral: Negative for hallucinations and suicidal ideas. 14 point ROS negative besides that noted in HPI      Vitals:    06/18/19 1407   BP: (!) 165/86   Pulse: 68   Resp: 16   Temp: 97.4 °F (36.3 °C)   SpO2: 97%   Weight: 182 lb 6 oz (82.7 kg)   Height: 6' (1.829 m)       Body mass index is 24.73 kg/m². Physical Exam   Constitutional: He is oriented to person, place, and time. He appears well-developed and well-nourished. No distress. HENT:   Head: Normocephalic.    Right Ear: External ear normal.   Left Ear: External ear normal.   Nose: Nose normal. Eyes: Pupils are equal, round, and reactive to light. Conjunctivae and EOM are normal. Right eye exhibits no discharge. Left eye exhibits no discharge. Neck: Normal range of motion. Neck supple. Cardiovascular: Normal rate and regular rhythm. Pulmonary/Chest: Effort normal and breath sounds normal. No stridor. He has no wheezes. Abdominal: Soft. Bowel sounds are normal. There is no tenderness. There is no guarding. Musculoskeletal: He exhibits tenderness. Cervical back: He exhibits decreased range of motion and tenderness. Lumbar back: He exhibits tenderness ( Bilateral lumbar muscle ttp). Back:    Neurological: He is alert and oriented to person, place, and time. He exhibits normal muscle tone. He displays no seizure activity. Coordination and gait normal.   Reflex Scores:       Tricep reflexes are 2+ on the right side and 2+ on the left side. Bicep reflexes are 2+ on the right side and 2+ on the left side. Brachioradialis reflexes are 2+ on the right side and 2+ on the left side. Patellar reflexes are 2+ on the right side and 2+ on the left side. Achilles reflexes are 2+ on the right side and 2+ on the left side. Bilateral lower leg strength equal and negative foot drop   Skin: Skin is warm and dry. No rash noted. He is not diaphoretic. No erythema. Psychiatric: He has a normal mood and affect. His behavior is normal. Judgment and thought content normal. He is not aggressive and not hyperactive. He expresses no homicidal and no suicidal ideation. Garbled speech  [place]  Daughter in room   Nursing note and vitals reviewed.       LAST UDS: 6/18/2019    Labs:  No results found for: NA, K, CL, CO2, BUN, CREATININE, GLUCOSE, CALCIUM     No results found for: WBC, HGB, HCT, MCV, PLT    Recent Imaging:                Principal Problem:    Cervical radicular pain  Active Problems:    Chronic left shoulder pain    Myofascial muscle pain    Cervicalgia  Resolved Problems:    * No resolved hospital problems. *      Plan:  1. Refill Norco 7.5mg BID PRN #60  2. Gabapentin 100mg bid PRN 60-effective family support medicine locked up  3. I have discussed and re-order KAITLYNN c5-c6 Dr. Beata Wagner  4. Uses compounded cream   5. Continue home stretches and exercises          Discussion: Discussed exam findings and plan of care with patient. Patient agreed with POC and questions were asked and answered. Routine follow-up patient here with daughter. Patient simple terms hard of hearing. Patient uses Norco and gabapentin for effectiveness. Family support at home. Success with cervical epidural C5-C6 would like to repeat. Activity: discussed exercise as beneficial to pain reduction, encouraged stretching exercisewith a focus on torso strengthening. Education Provided by Provider:  Review of Catherine Hamper / Agreement Review / ORT Calculated / PHQ-9 Reviewed / Compliance Issues Discussed / Cognitive Behavior Needs / Exercise / Review of Test / Financial Issues / Teaching / Smoking Cessation / Tobacco/Alcohol Use    Controlled Substance Monitoring:   Discussed with patient possible medication side effects, risk of tolerance, dependenceand alternative treatments. Discussed the growing epidemic in the [place] with the overprescribing and at times the abuse of narcotics. Discussed the detrimental effects of long term narcotic use in younger patients. Patientencouraged to set daily goals of exercising and if on narcotics decreasing daily narcotic intake. Discussed with the patient about the development of hyperalgesia with long term narcotic intake. CC:  REJI Rutledge - REJI Ware, 6/20/2019 at 11:46 AM    EMR dragon/transcription disclaimer: Much of this encounter note is electronic transcription/translation of spoken language to printed tach.  Electronic translation of spoken language may be erroneous, or at times, nonsensical words or phrases may be inadvertently transcribed.  Although, I have reviewed the note for such errors, some may still exist.

## 2019-06-18 NOTE — PROGRESS NOTES
Nursing Admission Record    Current Issues / Falls / ER Visits:  No pt for follow up C6-C7 epidural 04/26/19    Percentage of Pain Relief after Last Procedure:  na %    How long lasted:  0 days    Opioids Prescribed: Yes    Was Medication Brought to Appt: No    Hx of any Neck/Back Surgeries? no    Is Patient currently taking a blood thinner?  no    MRI exams received in the past 2 years:  Yes       When 06/01/18                                              Where       Imaging on chart: Yes         Imaging records requested: no    CT exam received during the last 12 months: No       When                                             Where       Imaging on chart: No         Imaging records requested: No    X-ray exam received during the last 12 months: No       When                                               Where       Imaging on chart: No         Imaging records requested: No    Nerve Conduction Study/EMG:  No       When                                               Where       Imaging on chart: No         Imaging records requested: No    Physical therapy during the last 6 months: No       When:                         Where     Labs during the last 12 months: Yes       When: 06/18/19                                             Where [facility]    PEG Score: 8    Last PEG Score: 8.7    Annual ORT Score: 0    Annual PHQ Score: 11    Last UDS Results: 06/18/19    Education Provided:  [x] Review of Fritz  [] Agreement Review  [x] PEG Score Calculated [] PHQ Score Calculated [] ORT Score Calculated    [] Compliance Issues Discussed [] Cognitive Behavior Needs [x] Exercise [] Review of Test [] Financial Issues  [x] Tobacco/Alcohol Use Reviewed [x] Teaching [] New Patient [] Picture Obtained    Physician Plan:  [] Outgoing Referral  [] Pharmacy Consult  [] Test Ordered [] Prescription Ordered/Changed [] Blood Thinner Request Form  [] Obtained Test Results / Consult Notes  [] UDS due at next visit, verified per [facility]      [] Suspected Physical Abuse or Suicide Risk assessed - IF YES COMPLETE QUESTIONS BELOW    If any of the following questions are answered yes - contact attending physician for referral:    Has been considering harming self to escape stress, pain problems? [] YES  [] NO  Has a suicide plan? [] YES  [] NO  Has attempted suicide in the past?   [] YES  [] NO  Has a close friend or family member who committed suicide?   [] YES  [] NO    Assessment Completed by:  Electronically signed by Martha Johnson RN on 6/18/2019 at 1:11 PM

## 2019-07-30 DIAGNOSIS — G89.29 CHRONIC LEFT SHOULDER PAIN: ICD-10-CM

## 2019-07-30 DIAGNOSIS — M25.512 CHRONIC LEFT SHOULDER PAIN: ICD-10-CM

## 2019-07-30 RX ORDER — EMOLLIENT BASE
CREAM (GRAM) TOPICAL
Qty: 300 G | Refills: 5
Start: 2019-07-30 | End: 2020-01-23 | Stop reason: SDUPTHER

## 2019-08-02 ENCOUNTER — HOSPITAL ENCOUNTER (OUTPATIENT)
Dept: PAIN MANAGEMENT | Age: 84
Discharge: HOME | End: 2019-08-02
Payer: MEDICARE

## 2019-08-02 VITALS
TEMPERATURE: 97.5 F | SYSTOLIC BLOOD PRESSURE: 122 MMHG | DIASTOLIC BLOOD PRESSURE: 73 MMHG | OXYGEN SATURATION: 95 % | RESPIRATION RATE: 18 BRPM | HEART RATE: 80 BPM

## 2019-08-02 DIAGNOSIS — R52 PAIN MANAGEMENT: ICD-10-CM

## 2019-08-02 PROCEDURE — 6360000002 HC RX W HCPCS

## 2019-08-02 PROCEDURE — 2580000003 HC RX 258

## 2019-08-02 PROCEDURE — 3209999900 FLUORO FOR SURGICAL PROCEDURES

## 2019-08-02 PROCEDURE — 62321 NJX INTERLAMINAR CRV/THRC: CPT

## 2019-08-02 PROCEDURE — 2500000003 HC RX 250 WO HCPCS

## 2019-08-02 RX ORDER — METHYLPREDNISOLONE ACETATE 80 MG/ML
INJECTION, SUSPENSION INTRA-ARTICULAR; INTRALESIONAL; INTRAMUSCULAR; SOFT TISSUE
Status: COMPLETED | OUTPATIENT
Start: 2019-08-02 | End: 2019-08-02

## 2019-08-02 RX ORDER — LIDOCAINE HYDROCHLORIDE 10 MG/ML
INJECTION, SOLUTION EPIDURAL; INFILTRATION; INTRACAUDAL; PERINEURAL
Status: COMPLETED | OUTPATIENT
Start: 2019-08-02 | End: 2019-08-02

## 2019-08-02 RX ORDER — 0.9 % SODIUM CHLORIDE 0.9 %
VIAL (ML) INJECTION
Status: COMPLETED | OUTPATIENT
Start: 2019-08-02 | End: 2019-08-02

## 2019-08-02 RX ADMIN — METHYLPREDNISOLONE ACETATE 80 MG: 80 INJECTION, SUSPENSION INTRA-ARTICULAR; INTRALESIONAL; INTRAMUSCULAR; SOFT TISSUE at 10:58

## 2019-08-02 RX ADMIN — LIDOCAINE HYDROCHLORIDE 5 ML: 10 INJECTION, SOLUTION EPIDURAL; INFILTRATION; INTRACAUDAL; PERINEURAL at 10:58

## 2019-08-02 RX ADMIN — Medication 5 ML: at 10:58

## 2019-08-02 ASSESSMENT — PAIN - FUNCTIONAL ASSESSMENT
PAIN_FUNCTIONAL_ASSESSMENT: 0-10
PAIN_FUNCTIONAL_ASSESSMENT: 0-10

## 2019-08-05 ENCOUNTER — TELEPHONE (OUTPATIENT)
Dept: PAIN MANAGEMENT | Age: 84
End: 2019-08-05

## 2019-08-05 DIAGNOSIS — G89.29 CHRONIC LEFT SHOULDER PAIN: ICD-10-CM

## 2019-08-05 DIAGNOSIS — M25.512 CHRONIC LEFT SHOULDER PAIN: ICD-10-CM

## 2019-08-07 RX ORDER — HYDROCODONE BITARTRATE AND ACETAMINOPHEN 7.5; 325 MG/1; MG/1
1 TABLET ORAL EVERY 8 HOURS PRN
Qty: 60 TABLET | Refills: 0 | Status: SHIPPED | OUTPATIENT
Start: 2019-08-07 | End: 2019-08-26 | Stop reason: SDUPTHER

## 2019-08-26 DIAGNOSIS — G89.29 CHRONIC LEFT SHOULDER PAIN: ICD-10-CM

## 2019-08-26 DIAGNOSIS — M25.512 CHRONIC LEFT SHOULDER PAIN: ICD-10-CM

## 2019-08-27 RX ORDER — HYDROCODONE BITARTRATE AND ACETAMINOPHEN 7.5; 325 MG/1; MG/1
1 TABLET ORAL EVERY 8 HOURS PRN
Qty: 60 TABLET | Refills: 0 | Status: SHIPPED | OUTPATIENT
Start: 2019-09-06 | End: 2019-12-02

## 2019-09-04 ENCOUNTER — OFFICE VISIT (OUTPATIENT)
Dept: OTOLARYNGOLOGY | Facility: CLINIC | Age: 84
End: 2019-09-04

## 2019-09-04 VITALS
WEIGHT: 187 LBS | SYSTOLIC BLOOD PRESSURE: 129 MMHG | HEIGHT: 72 IN | TEMPERATURE: 98.1 F | DIASTOLIC BLOOD PRESSURE: 61 MMHG | HEART RATE: 83 BPM | BODY MASS INDEX: 25.33 KG/M2

## 2019-09-04 DIAGNOSIS — H90.3 SENSORINEURAL HEARING LOSS (SNHL) OF BOTH EARS: ICD-10-CM

## 2019-09-04 DIAGNOSIS — H69.83 DYSFUNCTION OF BOTH EUSTACHIAN TUBES: Primary | ICD-10-CM

## 2019-09-04 DIAGNOSIS — H60.392 OTHER INFECTIVE ACUTE OTITIS EXTERNA OF LEFT EAR: ICD-10-CM

## 2019-09-04 DIAGNOSIS — H65.23 BILATERAL CHRONIC SEROUS OTITIS MEDIA: ICD-10-CM

## 2019-09-04 PROCEDURE — 99203 OFFICE O/P NEW LOW 30 MIN: CPT | Performed by: PHYSICIAN ASSISTANT

## 2019-09-04 RX ORDER — CLOTRIMAZOLE 1 G/ML
SOLUTION TOPICAL
Qty: 15 ML | Refills: 0 | Status: SHIPPED | OUTPATIENT
Start: 2019-09-04 | End: 2019-09-24

## 2019-09-04 RX ORDER — GABAPENTIN 100 MG/1
100 CAPSULE ORAL
COMMUNITY
Start: 2019-05-17 | End: 2019-12-15

## 2019-09-04 RX ORDER — AZELASTINE 1 MG/ML
2 SPRAY, METERED NASAL 2 TIMES DAILY
Qty: 30 ML | Refills: 11 | Status: SHIPPED | OUTPATIENT
Start: 2019-09-04

## 2019-09-04 RX ORDER — FLUTICASONE PROPIONATE 50 MCG
2 SPRAY, SUSPENSION (ML) NASAL DAILY
Qty: 16 G | Refills: 11 | Status: SHIPPED | OUTPATIENT
Start: 2019-09-04

## 2019-09-04 NOTE — PROGRESS NOTES
ESSENCE Dodge     Chief Complaint   Patient presents with   • Ear Problem     infection, left        HISTORY OF PRESENT ILLNESS:     Abelino Chaudhari is a  83 y.o.  male who complains of ear fullness, ear pressure, otorrhea and hearing loss. The symptoms are localized to the bilateral ears. The patient has had moderate symptoms. The symptoms have been relatively constant for the last several weeks. The symptoms are aggravated by  no identifiable factors. The symptoms are improved by no identifiable factors.    The patient reports bilateral ear fullness with left ear drainage and irritation. The patient has been treated with antibiotics without improvement of symptoms.    Review of Systems   Constitutional: Negative for activity change, appetite change, chills, diaphoresis, fatigue, fever and unexpected weight change.   HENT: Positive for ear discharge and ear pain. Negative for congestion, facial swelling, hearing loss, mouth sores, nosebleeds, postnasal drip, rhinorrhea, sinus pressure, sneezing, sore throat, tinnitus, trouble swallowing and voice change.    Eyes: Negative for pain, discharge, redness, itching and visual disturbance.   Respiratory: Negative for apnea, cough, choking, chest tightness, shortness of breath, wheezing and stridor.    Gastrointestinal: Negative for nausea and vomiting.   Endocrine: Negative for cold intolerance and heat intolerance.   Musculoskeletal: Negative for arthralgias, back pain, gait problem, neck pain and neck stiffness.   Skin: Negative for rash.   Allergic/Immunologic: Negative for environmental allergies and food allergies.   Neurological: Negative for dizziness, tremors, seizures, syncope, facial asymmetry, speech difficulty, weakness, light-headedness, numbness and headaches.   Hematological: Negative for adenopathy. Does not bruise/bleed easily.   Psychiatric/Behavioral: Negative for behavioral problems, sleep disturbance and suicidal ideas. The patient is not  nervous/anxious and is not hyperactive.    :    Past History:  History reviewed. No pertinent past medical history.  Past Surgical History:   Procedure Laterality Date   • AMPUTATION     • APPENDECTOMY       History reviewed. No pertinent family history.  Social History     Tobacco Use   • Smoking status: Former Smoker   • Smokeless tobacco: Former User     Types: Chew   Substance Use Topics   • Alcohol use: No     Frequency: Never   • Drug use: Defer     Outpatient Medications Marked as Taking for the 9/4/19 encounter (Office Visit) with Stan Orozco PA   Medication Sig Dispense Refill   • diclofenac (VOLTAREN) 50 MG EC tablet Every 12 (Twelve) Hours.     • gabapentin (NEURONTIN) 100 MG capsule Take 100 mg by mouth.     • NON FORMULARY   5     Allergies:  Patient has no known allergies.          Vital Signs:   Vitals:    09/04/19 0857   BP: 129/61   Pulse: 83   Temp: 98.1 °F (36.7 °C)         EXAMINATION:   CONSTITUTIONAL: well nourished, alert, oriented, in no acute distress     COMMUNICATION AND VOICE: able to communicate normally, normal voice quality    HEAD: normocephalic, no lesions, atraumatic, no tenderness, no masses     FACE: appearance normal, no lesions, no tenderness, no deformities, facial motion symmetric    SALIVARY GLANDS: parotid glands with no tenderness, no swelling, no masses, submandibular glands with normal size, nontender    EYES: ocular motility normal, eyelids normal, orbits normal, no proptosis, conjunctiva normal , pupils equal, round     EARS:  Hearing: response to conversational voice normal bilaterally   External Ears: auricles without lesions  Otoscopic: bilateral tympanic membrane appearance dull, no lesions, no perforation, decreased mobility with effusion, left ear canal with fungal accumulation removed with suction    NOSE:  External Nose: structure normal, no tenderness on palpation, no nasal discharge, no lesions, no evidence of trauma, nostrils patent   Intranasal  Exam: nasal mucosa normal, vestibule within normal limits, inferior turbinate normal, nasal septum midline     ORAL:  Lips: upper and lower lips without lesion   Teeth: dentition within normal limits for age   Gums: gingivae healthy   Oral Mucosa: oral mucosa normal, no mucosal lesions   Floor of Mouth: Warthin’s duct patent, mucosa normal  Tongue: lingual mucosa normal without lesions, normal tongue mobility   Palate: soft and hard palates with normal mucosa and structure  Oropharynx: oropharyngeal mucosa normal    NECK: neck appearance normal, no mass,  noted without erythema or tenderness    THYROID: no overt thyromegaly, no tenderness, nodules or mass present on palpation, position midline     LYMPH NODES: no lymphadenopathy    CHEST/RESPIRATORY: respiratory effort normal, normal breath sounds     CARDIOVASCULAR: rate and rhythm normal, extremities without cyanosis or edema      NEUROLOGIC/PSYCHIATRIC: oriented to time, place and person, mood normal, affect appropriate, CN II-XII intact grossly    RESULTS REVIEW:    I have reviewed the patients old records in the chart.       Assessment    Diagnosis Plan   1. Dysfunction of both eustachian tubes  fluticasone (FLONASE) 50 MCG/ACT nasal spray    azelastine (ASTELIN) 0.1 % nasal spray    Comprehensive Hearing Test    Tympanometry   2. Bilateral chronic serous otitis media  fluticasone (FLONASE) 50 MCG/ACT nasal spray    azelastine (ASTELIN) 0.1 % nasal spray    Comprehensive Hearing Test    Tympanometry   3. Sensorineural hearing loss (SNHL) of both ears  Comprehensive Hearing Test    Tympanometry   4. Other infective acute otitis externa of left ear  clotrimazole (LOTRIMIN) 1 % external solution       Plan    Patient Instructions   Will start clotrimazole in left ear and nasal sprays for effusion. Recheck in 1 week with audiogram.    New Medications Ordered This Visit   Medications   • fluticasone (FLONASE) 50 MCG/ACT nasal spray     Si sprays into the  nostril(s) as directed by provider Daily.     Dispense:  16 g     Refill:  11   • azelastine (ASTELIN) 0.1 % nasal spray     Si sprays into the nostril(s) as directed by provider 2 (Two) Times a Day. Use in each nostril as directed     Dispense:  30 mL     Refill:  11   • clotrimazole (LOTRIMIN) 1 % external solution     Sig: 3-4 drops in affected ear twice a day     Dispense:  15 mL     Refill:  0     Orders Placed This Encounter   Procedures   • Comprehensive Hearing Test   • Tympanometry          Return in about 1 week (around 2019) for Recheck ears with audiogram.    ESSENCE Dodge  19  11:18 PM

## 2019-09-04 NOTE — PATIENT INSTRUCTIONS
Will start clotrimazole in left ear and nasal sprays for effusion. Recheck in 1 week with audiogram.

## 2019-09-09 DIAGNOSIS — G89.29 CHRONIC LEFT SHOULDER PAIN: ICD-10-CM

## 2019-09-09 DIAGNOSIS — M25.512 CHRONIC LEFT SHOULDER PAIN: ICD-10-CM

## 2019-09-09 RX ORDER — GABAPENTIN 100 MG/1
100 CAPSULE ORAL 2 TIMES DAILY
Qty: 60 CAPSULE | Refills: 2 | Status: SHIPPED | OUTPATIENT
Start: 2019-09-09 | End: 2019-09-17

## 2019-09-17 ENCOUNTER — HOSPITAL ENCOUNTER (OUTPATIENT)
Dept: PAIN MANAGEMENT | Age: 84
Discharge: HOME | End: 2019-09-17
Payer: MEDICARE

## 2019-09-17 VITALS
HEIGHT: 72 IN | BODY MASS INDEX: 24.79 KG/M2 | RESPIRATION RATE: 18 BRPM | WEIGHT: 183 LBS | DIASTOLIC BLOOD PRESSURE: 81 MMHG | TEMPERATURE: 98 F | OXYGEN SATURATION: 95 % | SYSTOLIC BLOOD PRESSURE: 123 MMHG | HEART RATE: 74 BPM

## 2019-09-17 DIAGNOSIS — G89.29 CHRONIC LEFT SHOULDER PAIN: ICD-10-CM

## 2019-09-17 DIAGNOSIS — M25.512 CHRONIC LEFT SHOULDER PAIN: ICD-10-CM

## 2019-09-17 DIAGNOSIS — M54.12 CERVICAL RADICULAR PAIN: Primary | ICD-10-CM

## 2019-09-17 PROCEDURE — 99215 OFFICE O/P EST HI 40 MIN: CPT

## 2019-09-17 PROCEDURE — 99214 OFFICE O/P EST MOD 30 MIN: CPT | Performed by: NURSE PRACTITIONER

## 2019-09-17 RX ORDER — HYDROCODONE BITARTRATE AND ACETAMINOPHEN 7.5; 325 MG/1; MG/1
1 TABLET ORAL 2 TIMES DAILY PRN
Qty: 60 TABLET | Refills: 0 | Status: SHIPPED | OUTPATIENT
Start: 2019-10-05 | End: 2019-11-06 | Stop reason: SDUPTHER

## 2019-09-17 RX ORDER — GABAPENTIN 100 MG/1
100 CAPSULE ORAL 2 TIMES DAILY
Qty: 60 CAPSULE | Refills: 1 | Status: SHIPPED | OUTPATIENT
Start: 2019-09-17 | End: 2020-04-27

## 2019-09-17 ASSESSMENT — PAIN SCALES - GENERAL: PAINLEVEL_OUTOF10: 9

## 2019-09-17 ASSESSMENT — PAIN - FUNCTIONAL ASSESSMENT: PAIN_FUNCTIONAL_ASSESSMENT: PREVENTS OR INTERFERES SOME ACTIVE ACTIVITIES AND ADLS

## 2019-09-17 ASSESSMENT — ENCOUNTER SYMPTOMS
SORE THROAT: 0
ABDOMINAL PAIN: 0
CONSTIPATION: 0
EYE REDNESS: 0
BACK PAIN: 1
EYE PAIN: 0
CHEST TIGHTNESS: 0
SHORTNESS OF BREATH: 0
WHEEZING: 0

## 2019-09-17 ASSESSMENT — PAIN DESCRIPTION - PROGRESSION: CLINICAL_PROGRESSION: NOT CHANGED

## 2019-09-17 ASSESSMENT — ACTIVITIES OF DAILY LIVING (ADL): EFFECT OF PAIN ON DAILY ACTIVITIES: LIMITS ACTIVITY

## 2019-09-17 ASSESSMENT — PAIN DESCRIPTION - FREQUENCY: FREQUENCY: CONTINUOUS

## 2019-09-17 ASSESSMENT — PAIN DESCRIPTION - PAIN TYPE: TYPE: CHRONIC PAIN

## 2019-09-17 ASSESSMENT — PAIN DESCRIPTION - DESCRIPTORS: DESCRIPTORS: ACHING;CONSTANT

## 2019-09-17 ASSESSMENT — PAIN DESCRIPTION - ONSET: ONSET: ON-GOING

## 2019-09-17 ASSESSMENT — PAIN DESCRIPTION - LOCATION: LOCATION: ARM

## 2019-09-17 ASSESSMENT — PAIN DESCRIPTION - ORIENTATION: ORIENTATION: LEFT

## 2019-09-17 NOTE — H&P
Activities: limits activity  Functional Pain Assessment: Prevents or interferes some active activities and ADLs  Non-Pharmaceutical Pain Intervention(s): Repositioned, Rest  Response to Pain Intervention: Patient Satisfied  Multiple Pain Sites: No  POSS Score (Patient Ctrl Analgesia): 1    Employment: retired    Previous Injury: Yes    Previous Physical Therapy In the last 6 months? No    Did Physical Therapy make the pain better? No     MRI in the two last year? Yes  OF: cervical see images below    CT scan in last 12 months? No    X-ray last 12 months? No    Nerve Conduction Study / EMG No    Injections in the past? Yes    Did the injections help relieve the pain? Yes    Past Medical History  Past Medical History:   Diagnosis Date    Arthritis     Shoulder impingement     left       Medications  Current Outpatient Medications   Medication Sig Dispense Refill    [START ON 10/5/2019] HYDROcodone-acetaminophen (NORCO) 7.5-325 MG per tablet Take 1 tablet by mouth 2 times daily as needed for Pain for up to 30 days. 60 tablet 0    gabapentin (NEURONTIN) 100 MG capsule Take 1 capsule by mouth 2 times daily for 30 days. 60 capsule 1    HYDROcodone-acetaminophen (NORCO) 7.5-325 MG per tablet Take 1 tablet by mouth every 8 hours as needed for Pain for up to 30 days. May fill 09/06/19 60 tablet 0    Cream Base CREA [place] A Pain Cream #4 Add Gabapentin 6% Apply 1-2 pumps to affected area 3-4 times a day 300 g 5    gabapentin (NEURONTIN) 100 MG capsule Take 1 capsule by mouth 2 times daily for 30 days. 60 capsule 2    calcipotriene (DOVONEX) 0.005 % cream   5    diclofenac (VOLTAREN) 50 MG EC tablet Take 1 tablet by mouth 2 times daily       No current facility-administered medications for this encounter. Allergies  Patient has no known allergies.     Current Medications  Current Outpatient Medications   Medication Sig Dispense Refill    [START ON 10/5/2019] HYDROcodone-acetaminophen (NORCO) 7.5-325 MG per tablet Take 1 tablet by mouth 2 times daily as needed for Pain for up to 30 days. 60 tablet 0    gabapentin (NEURONTIN) 100 MG capsule Take 1 capsule by mouth 2 times daily for 30 days. 60 capsule 1    HYDROcodone-acetaminophen (NORCO) 7.5-325 MG per tablet Take 1 tablet by mouth every 8 hours as needed for Pain for up to 30 days. May fill 09/06/19 60 tablet 0    Cream Base CREA [place] A Pain Cream #4 Add Gabapentin 6% Apply 1-2 pumps to affected area 3-4 times a day 300 g 5    gabapentin (NEURONTIN) 100 MG capsule Take 1 capsule by mouth 2 times daily for 30 days. 60 capsule 2    calcipotriene (DOVONEX) 0.005 % cream   5    diclofenac (VOLTAREN) 50 MG EC tablet Take 1 tablet by mouth 2 times daily       No current facility-administered medications for this encounter. Social History    Social History     Socioeconomic History    Marital status:       Spouse name: None    Number of children: None    Years of education: None    Highest education level: None   Occupational History    None   Social Needs    Financial resource strain: None    Food insecurity:     Worry: None     Inability: None    Transportation needs:     Medical: None     Non-medical: None   Tobacco Use    Smoking status: Former Smoker    Smokeless tobacco: Current User     Types: Chew   Substance and Sexual Activity    Alcohol use: No    Drug use: No    Sexual activity: None   Lifestyle    Physical activity:     Days per week: None     Minutes per session: None    Stress: None   Relationships    Social connections:     Talks on phone: None     Gets together: None     Attends [employer] service: None     Active member of club or organization: None     Attends meetings of clubs or organizations: None     Relationship status: None    Intimate partner violence:     Fear of current or ex partner: None     Emotionally abused: None     Physically abused: None     Forced sexual activity: None   Other Topics Concern    motion exercises. Medicines Locked up patient support with family  2. Gabapentin 100 mg twice #60.  3.  I discussed [facility] C5-C6 Dr. Damir Alexis  4. Patient uses compounded cream with effectiveness  5. Continue home stretches and exercises  6. Referral external orothopedics- wishes to see lorie. Nam Carranza. Discussion: Discussed exam findings and plan of care with patient. Patient agreed with POC and questions were asked and answered. Activity: discussed exercise as beneficial to pain reduction, encouraged stretching exercise with a focus ontorso strengthening. Education Provided by provider:   Review of Star Dials / Agreement Review / ORT Calculated / PHQ-9 Reviewed / Compliance Issues Discussed / Cognitive Behavior Needs / Exercise / Review of Test / Financial Issues / Teaching / Smoking Cessation / Tobacco/Alcohol Use     Controlled Substance Monitoring:  Discussed possible narcotic pain medication side effects, risk of tolerance and/or dependence, and alternative treatments. Discussed the effects of long term narcotic use. Patient encouraged to set daily goals of exercising and if on narcotics to decrease daily narcotic intake. CC:  REJI Dewey - REJI Baca, 9/17/2019 at2:49 PM    EMR dragon/transcription disclaimer: Much of this encounter note is electronic transcription/translation of spoken language to printed tach. Electronic translation of spoken language may be erroneous, or at times, nonsensical words or phrases may be inadvertently transcribed.  Although, I have reviewed the note for such errors, some may still exist.

## 2019-09-18 ENCOUNTER — PROCEDURE VISIT (OUTPATIENT)
Dept: OTOLARYNGOLOGY | Facility: CLINIC | Age: 84
End: 2019-09-18

## 2019-09-18 ENCOUNTER — TRANSCRIBE ORDERS (OUTPATIENT)
Dept: ORTHOPEDIC SURGERY | Facility: CLINIC | Age: 84
End: 2019-09-18

## 2019-09-18 ENCOUNTER — OFFICE VISIT (OUTPATIENT)
Dept: OTOLARYNGOLOGY | Facility: CLINIC | Age: 84
End: 2019-09-18

## 2019-09-18 VITALS
HEIGHT: 72 IN | HEART RATE: 90 BPM | SYSTOLIC BLOOD PRESSURE: 144 MMHG | BODY MASS INDEX: 24.92 KG/M2 | TEMPERATURE: 97.3 F | WEIGHT: 184 LBS | DIASTOLIC BLOOD PRESSURE: 57 MMHG

## 2019-09-18 DIAGNOSIS — H61.22 IMPACTED CERUMEN OF LEFT EAR: Primary | ICD-10-CM

## 2019-09-18 DIAGNOSIS — M25.512 CHRONIC LEFT SHOULDER PAIN: Primary | ICD-10-CM

## 2019-09-18 DIAGNOSIS — G89.29 CHRONIC LEFT SHOULDER PAIN: Primary | ICD-10-CM

## 2019-09-18 DIAGNOSIS — H60.392 OTHER INFECTIVE ACUTE OTITIS EXTERNA OF LEFT EAR: Primary | ICD-10-CM

## 2019-09-18 DIAGNOSIS — H60.392 OTHER INFECTIVE ACUTE OTITIS EXTERNA OF LEFT EAR: ICD-10-CM

## 2019-09-18 PROBLEM — H60.90 OTITIS EXTERNA: Status: ACTIVE | Noted: 2019-09-18

## 2019-09-18 PROCEDURE — 69210 REMOVE IMPACTED EAR WAX UNI: CPT | Performed by: PHYSICIAN ASSISTANT

## 2019-09-18 PROCEDURE — 99213 OFFICE O/P EST LOW 20 MIN: CPT | Performed by: PHYSICIAN ASSISTANT

## 2019-09-18 NOTE — PROGRESS NOTES
ESSENCE Dodge     Chief Complaint   Patient presents with   • Follow-up     ears        HISTORY OF PRESENT ILLNESS:     Abelino Chaudhari is a  83 y.o.  male who presents for follow-up evaluation of complaints of ear fullness, ear pressure, otorrhea and hearing loss. The symptoms are localized to the left ear. The patient has had improved symptoms. The symptoms have been relatively constant for the last several weeks. The symptoms are aggravated by  no identifiable factors. The symptoms are improved by ear cleaning and ear drops.      Review of Systems   Constitutional: Negative for activity change, appetite change, chills, diaphoresis, fatigue, fever and unexpected weight change.   HENT: Positive for ear discharge. Negative for congestion, ear pain, facial swelling, hearing loss, mouth sores, nosebleeds, postnasal drip, rhinorrhea, sinus pressure, sneezing, sore throat, tinnitus, trouble swallowing and voice change.    Eyes: Negative for pain, discharge, redness, itching and visual disturbance.   Respiratory: Negative for apnea, cough, choking, chest tightness, shortness of breath, wheezing and stridor.    Gastrointestinal: Negative for nausea and vomiting.   Endocrine: Negative for cold intolerance and heat intolerance.   Musculoskeletal: Negative for arthralgias, back pain, gait problem, neck pain and neck stiffness.   Skin: Negative for rash.   Allergic/Immunologic: Negative for environmental allergies and food allergies.   Neurological: Negative for dizziness, tremors, seizures, syncope, facial asymmetry, speech difficulty, weakness, light-headedness, numbness and headaches.   Hematological: Negative for adenopathy. Does not bruise/bleed easily.   Psychiatric/Behavioral: Negative for behavioral problems, sleep disturbance and suicidal ideas. The patient is not nervous/anxious and is not hyperactive.    :    Past History:  History reviewed. No pertinent past medical history.  Past Surgical History:    Procedure Laterality Date   • AMPUTATION     • APPENDECTOMY       History reviewed. No pertinent family history.  Social History     Tobacco Use   • Smoking status: Former Smoker   • Smokeless tobacco: Former User     Types: Chew   Substance Use Topics   • Alcohol use: No     Frequency: Never   • Drug use: Defer     Outpatient Medications Marked as Taking for the 9/18/19 encounter (Office Visit) with Stan Orozco PA   Medication Sig Dispense Refill   • azelastine (ASTELIN) 0.1 % nasal spray 2 sprays into the nostril(s) as directed by provider 2 (Two) Times a Day. Use in each nostril as directed 30 mL 11   • clotrimazole (LOTRIMIN) 1 % external solution 3-4 drops in affected ear twice a day 15 mL 0   • diclofenac (VOLTAREN) 50 MG EC tablet Every 12 (Twelve) Hours.     • fluticasone (FLONASE) 50 MCG/ACT nasal spray 2 sprays into the nostril(s) as directed by provider Daily. 16 g 11   • gabapentin (NEURONTIN) 100 MG capsule Take 100 mg by mouth.     • NON FORMULARY   5     Allergies:  Patient has no known allergies.          Vital Signs:   Vitals:    09/18/19 0940   BP: 144/57   Pulse: 90   Temp: 97.3 °F (36.3 °C)         EXAMINATION:   CONSTITUTIONAL: well nourished, alert, oriented, in no acute distress     COMMUNICATION AND VOICE: able to communicate normally, normal voice quality    HEAD: normocephalic, no lesions, atraumatic, no tenderness, no masses     FACE: appearance normal, no lesions, no tenderness, no deformities, facial motion symmetric    EYES: ocular motility normal, eyelids normal, orbits normal, no proptosis, conjunctiva normal , pupils equal, round     EARS:  Hearing: response to conversational voice normal bilaterally   External Ears: auricles without lesions  Otoscopic: bilateral tympanic membrane appearance dull, no lesions, no perforation, decreased mobility with effusion, left ear canal with fungal accumulation removed with suction    NOSE:  External Nose: structure normal, no  tenderness on palpation, no nasal discharge, no lesions, no evidence of trauma, nostrils patent     ORAL:  Lips: upper and lower lips without lesion     NECK: neck appearance normal    CHEST/RESPIRATORY: respiratory effort normal, normal breath sounds     CARDIOVASCULAR: rate and rhythm normal, extremities without cyanosis or edema      NEUROLOGIC/PSYCHIATRIC: oriented to time, place and person, mood normal, affect appropriate, CN II-XII intact grossly    Procedure Note    Pre-operative Diagnosis: Cerumen Impaction    Post-operative Diagnosis: same    Anesthesia: none    Procedure:  left cerumen removal    Procedure Details:    The patient was seated in the exam chair. Using a handheld otoscope, the ear was examined.. The cerumen impaction was removed manually with suction.    Condition:  Stable.  Patient tolerated procedure well.    Complications:  None    RESULTS REVIEW:    I have reviewed the patients old records in the chart.       Assessment    Diagnosis Plan   1. Impacted cerumen of left ear     2. Other infective acute otitis externa of left ear         Plan    Patient Instructions   Continue clotrimazole drops in left ear. Recheck in one week with audiogram.               Return in about 1 week (around 9/25/2019) for Recheck ear with audiogram.    ESSENCE Dodge  09/18/19  3:39 PM

## 2019-09-24 ENCOUNTER — PROCEDURE VISIT (OUTPATIENT)
Dept: OTOLARYNGOLOGY | Facility: CLINIC | Age: 84
End: 2019-09-24

## 2019-09-24 ENCOUNTER — OFFICE VISIT (OUTPATIENT)
Dept: OTOLARYNGOLOGY | Facility: CLINIC | Age: 84
End: 2019-09-24

## 2019-09-24 DIAGNOSIS — H60.392 OTHER INFECTIVE ACUTE OTITIS EXTERNA OF LEFT EAR: Primary | ICD-10-CM

## 2019-09-24 DIAGNOSIS — H69.82 DYSFUNCTION OF LEFT EUSTACHIAN TUBE: Primary | ICD-10-CM

## 2019-09-24 DIAGNOSIS — H61.22 IMPACTED CERUMEN OF LEFT EAR: ICD-10-CM

## 2019-09-24 PROBLEM — H60.90 OTITIS EXTERNA: Status: RESOLVED | Noted: 2019-09-18 | Resolved: 2019-09-24

## 2019-09-24 PROCEDURE — 69210 REMOVE IMPACTED EAR WAX UNI: CPT | Performed by: PHYSICIAN ASSISTANT

## 2019-09-24 NOTE — PROGRESS NOTES
Procedure Note    Pre-operative Diagnosis: Cerumen Impaction    Post-operative Diagnosis: same    Anesthesia: none    Procedure:  left cerumen removal    Procedure Details:    The patient was seated in the exam chair. Using a handheld otoscope, the ear was examined.. The cerumen impaction was removed manually with suction.    Condition:  Stable.  Patient tolerated procedure well.    Complications:  None

## 2019-09-24 NOTE — PROGRESS NOTES
ESSENCE Dodge     No chief complaint on file.       HISTORY OF PRESENT ILLNESS:     Abelino Chaudhari is a  83 y.o.  male who presents for follow-up evaluation of complaints of ear fullness, ear pressure, otorrhea and hearing loss. The symptoms are localized to the left ear. The patient has had improved symptoms. The symptoms have been relatively constant for the last several weeks. The symptoms are aggravated by  no identifiable factors. The symptoms are improved by ear cleaning and ear drops.      Review of Systems   Constitutional: Negative for activity change, appetite change, chills, diaphoresis, fatigue, fever and unexpected weight change.   HENT: Positive for ear discharge. Negative for congestion, ear pain, facial swelling, hearing loss, mouth sores, nosebleeds, postnasal drip, rhinorrhea, sinus pressure, sneezing, sore throat, tinnitus, trouble swallowing and voice change.    Eyes: Negative for pain, discharge, redness, itching and visual disturbance.   Respiratory: Negative for apnea, cough, choking, chest tightness, shortness of breath, wheezing and stridor.    Gastrointestinal: Negative for nausea and vomiting.   Endocrine: Negative for cold intolerance and heat intolerance.   Musculoskeletal: Negative for arthralgias, back pain, gait problem, neck pain and neck stiffness.   Skin: Negative for rash.   Allergic/Immunologic: Negative for environmental allergies and food allergies.   Neurological: Negative for dizziness, tremors, seizures, syncope, facial asymmetry, speech difficulty, weakness, light-headedness, numbness and headaches.   Hematological: Negative for adenopathy. Does not bruise/bleed easily.   Psychiatric/Behavioral: Negative for behavioral problems, sleep disturbance and suicidal ideas. The patient is not nervous/anxious and is not hyperactive.    :    Past History:  No past medical history on file.  Past Surgical History:   Procedure Laterality Date   • AMPUTATION     •  APPENDECTOMY       No family history on file.  Social History     Tobacco Use   • Smoking status: Former Smoker   • Smokeless tobacco: Former User     Types: Chew   Substance Use Topics   • Alcohol use: No     Frequency: Never   • Drug use: Defer     No outpatient medications have been marked as taking for the 9/24/19 encounter (Appointment) with Stan Orozco PA.     Allergies:  Patient has no known allergies.          Vital Signs:   There were no vitals filed for this visit.      EXAMINATION:   CONSTITUTIONAL: well nourished, alert, oriented, in no acute distress     COMMUNICATION AND VOICE: able to communicate normally, normal voice quality    HEAD: normocephalic, no lesions, atraumatic, no tenderness, no masses     FACE: appearance normal, no lesions, no tenderness, no deformities, facial motion symmetric    EYES: ocular motility normal, eyelids normal, orbits normal, no proptosis, conjunctiva normal , pupils equal, round     EARS:  Hearing: response to conversational voice normal bilaterally   External Ears: auricles without lesions  Otoscopic: bilateral tympanic membrane appearance dull, no lesions, no perforation, decreased mobility with effusion, left ear canal with fungal accumulation removed with suction    NOSE:  External Nose: structure normal, no tenderness on palpation, no nasal discharge, no lesions, no evidence of trauma, nostrils patent     ORAL:  Lips: upper and lower lips without lesion     NECK: neck appearance normal    CHEST/RESPIRATORY: respiratory effort normal, normal breath sounds     CARDIOVASCULAR: rate and rhythm normal, extremities without cyanosis or edema      NEUROLOGIC/PSYCHIATRIC: oriented to time, place and person, mood normal, affect appropriate, CN II-XII intact grossly    Procedure Note    Pre-operative Diagnosis: Cerumen Impaction    Post-operative Diagnosis: same    Anesthesia: none    Procedure:  left cerumen removal    Procedure Details:    The patient was seated  in the exam chair. Using a handheld otoscope, the ear was examined.. The cerumen impaction was removed manually with suction.    Condition:  Stable.  Patient tolerated procedure well.    Complications:  None    RESULTS REVIEW:    I have reviewed the patients old records in the chart.       Assessment    Diagnosis Plan   1. Other infective acute otitis externa of left ear     2. Impacted cerumen of left ear         Plan    There are no Patient Instructions on file for this visit.             No Follow-up on file.    ESSENCE Dodge  09/23/19  8:32 PM

## 2019-09-30 ENCOUNTER — OFFICE VISIT (OUTPATIENT)
Dept: ORTHOPEDIC SURGERY | Facility: CLINIC | Age: 84
End: 2019-09-30

## 2019-09-30 VITALS — BODY MASS INDEX: 24.77 KG/M2 | WEIGHT: 182.9 LBS | HEIGHT: 72 IN

## 2019-09-30 DIAGNOSIS — M50.30 DDD (DEGENERATIVE DISC DISEASE), CERVICAL: Primary | ICD-10-CM

## 2019-09-30 DIAGNOSIS — M25.512 LEFT SHOULDER PAIN, UNSPECIFIED CHRONICITY: Primary | ICD-10-CM

## 2019-09-30 DIAGNOSIS — M75.42 SHOULDER IMPINGEMENT SYNDROME, LEFT: ICD-10-CM

## 2019-09-30 DIAGNOSIS — M25.512 ACUTE PAIN OF LEFT SHOULDER: ICD-10-CM

## 2019-09-30 PROCEDURE — 99203 OFFICE O/P NEW LOW 30 MIN: CPT | Performed by: NURSE PRACTITIONER

## 2019-09-30 NOTE — PROGRESS NOTES
Abelino Chaudhari is a 83 y.o. male   Primary provider:  Provider, No Known       Chief Complaint   Patient presents with   • Left Shoulder - Pain       HISTORY OF PRESENT ILLNESS:     83-year-old  male in the office today for evaluation of left shoulder and cervical spine pain which started after he attempted to move a refrigerator earlier this month.  When he felt pain and swelling in his left shoulder as well as neck.  The pain reports that radiates down into the left upper arm and.  He denies any fever chills or evidence of infection.  He is been evaluated and referred to us for further evaluation of his pain after being placed on diclofenac.  Diclofenac has not improved his symptoms    Pain   This is a recurrent problem. The current episode started more than 1 year ago. The problem has been unchanged. The symptoms are aggravated by standing and walking (sitting, driving ). Treatments tried: injections  The treatment provided moderate relief.        CONCURRENT MEDICAL HISTORY:    History reviewed. No pertinent past medical history.    No Known Allergies      Current Outpatient Medications:   •  azelastine (ASTELIN) 0.1 % nasal spray, 2 sprays into the nostril(s) as directed by provider 2 (Two) Times a Day. Use in each nostril as directed, Disp: 30 mL, Rfl: 11  •  diclofenac (VOLTAREN) 50 MG EC tablet, Every 12 (Twelve) Hours., Disp: , Rfl:   •  fluticasone (FLONASE) 50 MCG/ACT nasal spray, 2 sprays into the nostril(s) as directed by provider Daily., Disp: 16 g, Rfl: 11  •  gabapentin (NEURONTIN) 100 MG capsule, Take 100 mg by mouth., Disp: , Rfl:   •  NON FORMULARY, , Disp: , Rfl: 5    Past Surgical History:   Procedure Laterality Date   • AMPUTATION     • APPENDECTOMY         History reviewed. No pertinent family history.     Social History     Socioeconomic History   • Marital status:      Spouse name: Not on file   • Number of children: Not on file   • Years of education: Not on file   • Highest  "education level: Not on file   Tobacco Use   • Smoking status: Former Smoker   • Smokeless tobacco: Former User     Types: Chew   Substance and Sexual Activity   • Alcohol use: No     Frequency: Never   • Drug use: Defer   • Sexual activity: Defer        Review of Systems    PHYSICAL EXAMINATION:       Ht 182.9 cm (72\")   Wt 83 kg (182 lb 14.4 oz)   BMI 24.81 kg/m²     Physical Exam   Constitutional: He is oriented to person, place, and time. Vital signs are normal. He appears well-developed and well-nourished. He is cooperative.   HENT:   Head: Normocephalic and atraumatic.   Neck: Trachea normal and phonation normal.   Pulmonary/Chest: Effort normal. No respiratory distress.   Abdominal: Soft. Normal appearance. He exhibits no distension.   Neurological: He is alert and oriented to person, place, and time. GCS eye subscore is 4. GCS verbal subscore is 5. GCS motor subscore is 6.   Skin: Skin is warm, dry and intact. Capillary refill takes less than 2 seconds.   Psychiatric: He has a normal mood and affect. His speech is normal and behavior is normal. Judgment and thought content normal. Cognition and memory are normal.   Vitals reviewed.      GAIT:     []  Normal  []  Antalgic    Assistive device: []  None  []  Walker     []  Crutches  []  Cane     []  Wheelchair  []  Stretcher    Back Exam     Tenderness   The patient is experiencing tenderness in the cervical.    Range of Motion   Extension: abnormal   Flexion: abnormal   Lateral bend right: abnormal   Lateral bend left: abnormal   Rotation right: abnormal   Rotation left: abnormal     Other   Sensation: normal      Right Shoulder Exam   Right shoulder exam is normal.      Left Shoulder Exam     Tenderness   The patient is experiencing tenderness in the acromioclavicular joint and biceps tendon.    Range of Motion   Active abduction: abnormal   External rotation: abnormal   Forward flexion: abnormal   Internal rotation 0 degrees: abnormal     Muscle Strength "   Abduction: 4/5   Internal rotation: 4/5   External rotation: 4/5   Supraspinatus: 4/5   Biceps: 4/5     Tests   Cross arm: positive  Impingement: positive  Drop arm: positive    Other   Erythema: absent  Scars: absent  Sensation: normal  Pulse: present     Comments:  Notable bicep deformity                 Xr Spine Cervical 2 Or 3 View    Result Date: 9/30/2019  Narrative: Ordering Provider:  Alvaro Moffett APRN Ordering Diagnosis/Indication:  Pain Procedure:  XR SPINE CERVICAL 2 OR 3 VW Exam Date:  9/30/19 COMPARISON:  Not applicable, no relevant images available.     Impression:  AP and lateral view of the cervical spine reveals diffuse degenerative changes with no evidence of acute fracture or other radiological abnormality noted.     Xr Shoulder 2+ View Left    Result Date: 9/30/2019  Narrative: Ordering Provider:  Alvaro Moffett APRN Ordering Diagnosis/Indication:  Left shoulder pain, unspecified chronicity Procedure:  XR SHOULDER 2+ VW LEFT Exam Date:  9/30/19 COMPARISON: None     Impression:  Internally rotated, externally rotated and scapular Y view of the left shoulder reveals degenerative changes of the acromioclavicular joint with no evidence of acute fracture, dislocation or other radiological abnormality noted. RC Arshad 9/30/19           ASSESSMENT:    Diagnoses and all orders for this visit:    DDD (degenerative disc disease), cervical  -     MRI Shoulder Left Without Contrast; Future  -     MRI Cervical Spine Without Contrast; Future    Acute pain of left shoulder  -     MRI Shoulder Left Without Contrast; Future  -     MRI Cervical Spine Without Contrast; Future    Shoulder impingement syndrome, left  -     MRI Shoulder Left Without Contrast; Future  -     MRI Cervical Spine Without Contrast; Future          PLAN  Recommend MRIs of the left shoulder and cervical spine for further evaluation pain.  Patient has a notable bicep deformity with radicular symptoms after moving a  refrigerator on the trailer earlier this month which has not improved despite conservative treatment modalities.  In the meantime I instructed the patient to continue gentle range of motion exercises of the left shoulder and avoid any heavy lifting pulling or tugging.  No Follow-up on file.    Alvaro Moffett, APRN

## 2019-10-29 ENCOUNTER — TELEPHONE (OUTPATIENT)
Dept: PAIN MANAGEMENT | Age: 84
End: 2019-10-29

## 2019-10-30 DIAGNOSIS — M25.512 ACUTE PAIN OF LEFT SHOULDER: ICD-10-CM

## 2019-10-30 DIAGNOSIS — M75.42 SHOULDER IMPINGEMENT SYNDROME, LEFT: ICD-10-CM

## 2019-10-30 DIAGNOSIS — M50.30 DDD (DEGENERATIVE DISC DISEASE), CERVICAL: ICD-10-CM

## 2019-11-04 DIAGNOSIS — M25.512 ACUTE PAIN OF LEFT SHOULDER: ICD-10-CM

## 2019-11-04 DIAGNOSIS — M50.30 DDD (DEGENERATIVE DISC DISEASE), CERVICAL: ICD-10-CM

## 2019-11-04 DIAGNOSIS — M75.42 SHOULDER IMPINGEMENT SYNDROME, LEFT: ICD-10-CM

## 2019-11-05 ENCOUNTER — TELEPHONE (OUTPATIENT)
Dept: PAIN MANAGEMENT | Age: 84
End: 2019-11-05

## 2019-11-06 DIAGNOSIS — M54.12 CERVICAL RADICULAR PAIN: ICD-10-CM

## 2019-11-07 RX ORDER — HYDROCODONE BITARTRATE AND ACETAMINOPHEN 7.5; 325 MG/1; MG/1
1 TABLET ORAL 2 TIMES DAILY PRN
Qty: 60 TABLET | Refills: 0 | Status: SHIPPED | OUTPATIENT
Start: 2019-11-07 | End: 2019-12-02 | Stop reason: SDUPTHER

## 2019-11-13 ENCOUNTER — OFFICE VISIT (OUTPATIENT)
Dept: ORTHOPEDIC SURGERY | Facility: CLINIC | Age: 84
End: 2019-11-13

## 2019-11-13 VITALS — BODY MASS INDEX: 24.65 KG/M2 | WEIGHT: 182 LBS | HEIGHT: 72 IN

## 2019-11-13 DIAGNOSIS — M75.42 SHOULDER IMPINGEMENT SYNDROME, LEFT: ICD-10-CM

## 2019-11-13 DIAGNOSIS — M50.30 DDD (DEGENERATIVE DISC DISEASE), CERVICAL: Primary | ICD-10-CM

## 2019-11-13 DIAGNOSIS — M25.512 LEFT SHOULDER PAIN, UNSPECIFIED CHRONICITY: ICD-10-CM

## 2019-11-13 DIAGNOSIS — M25.512 ACUTE PAIN OF LEFT SHOULDER: ICD-10-CM

## 2019-11-13 PROCEDURE — 20610 DRAIN/INJ JOINT/BURSA W/O US: CPT | Performed by: NURSE PRACTITIONER

## 2019-11-13 PROCEDURE — 99214 OFFICE O/P EST MOD 30 MIN: CPT | Performed by: NURSE PRACTITIONER

## 2019-11-13 RX ORDER — LEVOFLOXACIN 500 MG/1
TABLET, FILM COATED ORAL
COMMUNITY
Start: 2019-11-06 | End: 2022-10-19

## 2019-11-13 RX ORDER — TRIAMCINOLONE ACETONIDE 40 MG/ML
80 INJECTION, SUSPENSION INTRA-ARTICULAR; INTRAMUSCULAR
Status: COMPLETED | OUTPATIENT
Start: 2019-11-13 | End: 2019-11-13

## 2019-11-13 RX ORDER — LIDOCAINE HYDROCHLORIDE 10 MG/ML
2 INJECTION, SOLUTION EPIDURAL; INFILTRATION; INTRACAUDAL; PERINEURAL
Status: COMPLETED | OUTPATIENT
Start: 2019-11-13 | End: 2019-11-13

## 2019-11-13 RX ADMIN — TRIAMCINOLONE ACETONIDE 80 MG: 40 INJECTION, SUSPENSION INTRA-ARTICULAR; INTRAMUSCULAR at 13:42

## 2019-11-13 RX ADMIN — LIDOCAINE HYDROCHLORIDE 2 ML: 10 INJECTION, SOLUTION EPIDURAL; INFILTRATION; INTRACAUDAL; PERINEURAL at 13:42

## 2019-11-13 NOTE — PROGRESS NOTES
"Abelino Chaudhari is a 84 y.o. male returns for     Chief Complaint   Patient presents with   • Left Shoulder - Follow-up, Pain   • Cervical Spine - Follow-up, Pain   • Results     10/24/19 MRI Cervical Spine Without Contrast    • Results      10/24/19 MRI Shoulder Left Without Contrast      [facility] MRI CERVICAL AND LEFT UPPER EXT.  10/24/19     HISTORY OF PRESENT ILLNESS: MRI RESULTS.  PAIN SCALE TODAY 9/10       CONCURRENT MEDICAL HISTORY:    The following portions of the patient's history were reviewed and updated as appropriate: allergies, current medications, past family history, past medical history, past social history, past surgical history and problem list.     ROS  No fevers or chills.  No chest pain or shortness of air.  No GI or  disturbances.    PHYSICAL EXAMINATION:       Ht 182.9 cm (72\")   Wt 82.6 kg (182 lb)   BMI 24.68 kg/m²     Physical Exam   Constitutional: He is oriented to person, place, and time. Vital signs are normal. He appears well-developed and well-nourished. He is cooperative.   HENT:   Head: Normocephalic and atraumatic.   Neck: Trachea normal and phonation normal.   Pulmonary/Chest: Effort normal. No respiratory distress.   Abdominal: Soft. Normal appearance. He exhibits no distension.   Neurological: He is alert and oriented to person, place, and time. GCS eye subscore is 4. GCS verbal subscore is 5. GCS motor subscore is 6.   Skin: Skin is warm, dry and intact. Capillary refill takes less than 2 seconds.   Psychiatric: He has a normal mood and affect. His speech is normal and behavior is normal. Judgment and thought content normal. Cognition and memory are normal.   Vitals reviewed.      GAIT:     [x]  Normal  []  Antalgic    Assistive device: [x]  None  []  Walker     []  Crutches  []  Cane     []  Wheelchair  []  Stretcher    Back Exam     Tenderness   The patient is experiencing tenderness in the cervical.    Range of Motion   Extension: abnormal   Flexion: abnormal   Lateral " Patient is a 87y old  Female who presents with a chief complaint of SOB (20 Mar 2018 15:43)      INTERVAL HPI/OVERNIGHT EVENTS:    MEDICATIONS  (STANDING):  aspirin enteric coated 81 milliGRAM(s) Oral daily  buDESOnide 160 MICROgram(s)/formoterol 4.5 MICROgram(s) Inhaler 2 Puff(s) Inhalation two times a day  dextrose 5%. 1000 milliLiter(s) (50 mL/Hr) IV Continuous <Continuous>  dextrose 50% Injectable 12.5 Gram(s) IV Push once  dextrose 50% Injectable 25 Gram(s) IV Push once  dextrose 50% Injectable 25 Gram(s) IV Push once  dorzolamide 2% Ophthalmic Solution 1 Drop(s) Both EYES three times a day  enoxaparin Injectable 60 milliGRAM(s) SubCutaneous daily  furosemide    Tablet 60 milliGRAM(s) Oral two times a day  hydrALAZINE 50 milliGRAM(s) Oral every 8 hours  insulin glargine Injectable (LANTUS) 14 Unit(s) SubCutaneous every morning  insulin lispro (HumaLOG) corrective regimen sliding scale   SubCutaneous three times a day before meals  metoprolol succinate ER 25 milliGRAM(s) Oral daily  pantoprazole    Tablet 40 milliGRAM(s) Oral before breakfast  ranolazine 500 milliGRAM(s) Oral two times a day  senna 2 Tablet(s) Oral at bedtime  sodium bicarbonate 325 milliGRAM(s) Oral two times a day  ticagrelor 90 milliGRAM(s) Oral two times a day    MEDICATIONS  (PRN):  dextrose Gel 1 Dose(s) Oral once PRN Blood Glucose LESS THAN 70 milliGRAM(s)/deciliter  glucagon  Injectable 1 milliGRAM(s) IntraMuscular once PRN Glucose LESS THAN 70 milligrams/deciliter      Allergies    codeine (Other)    Intolerances        REVIEW OF SYSTEMS:        HEENT - wnl  RESPIRATORY: No cough, wheezing, chills or hemoptysis; No shortness of breath  CARDIOVASCULAR: No chest pain, palpitations, dizziness, or leg swelling  GASTROINTESTINAL: No abdominal or epigastric pain. No nausea, vomiting, or hematemesis; No diarrhea or constipation. No melena or hematochezia.  GENITOURINARY: No dysuria, frequency, hematuria, or incontinence  SKIN: No itching, burning, rashes, or lesions   MUSCULOSKELETAL: No joint pain or swelling; No muscle, back, or extremity pain  PSYCHIATRIC: No depression, anxiety, mood swings, or difficulty sleeping        Vital Signs Last 24 Hrs  T(C): 36.7 (23 Mar 2018 05:09), Max: 37.5 (23 Mar 2018 00:07)  T(F): 98.1 (23 Mar 2018 05:09), Max: 99.5 (23 Mar 2018 00:07)  HR: 82 (23 Mar 2018 05:09) (81 - 82)  BP: 118/51 (23 Mar 2018 05:09) (118/51 - 144/63)  BP(mean): --  RR: 18 (23 Mar 2018 05:09) (17 - 20)  SpO2: 96% (23 Mar 2018 05:09) (96% - 100%)    PHYSICAL EXAM:  general       HEENT wnl  CHEST/LUNG: Clear to percussion bilaterally; No rales, rhonchi, wheezing, or rubs  HEART: Regular rate and rhythm; No murmurs, rubs, or gallops  ABDOMEN: Soft, Nontender, Nondistended; Bowel sounds present  EXTREMITIES:  2+ Peripheral Pulses, No clubbing, cyanosis, or edema  LYMPH: No lymphadenopathy noted  SKIN: No rashes or lesions    LABS:                        10.1   2.98  )-----------( 220      ( 22 Mar 2018 06:18 )             31.6     03-23    132<L>  |  94<L>  |  73<H>  ----------------------------<  81  4.4   |  27  |  3.40<H>    Ca    9.4      23 Mar 2018 07:28  Mg     2.6     03-23        Urinalysis Basic - ( 21 Mar 2018 11:34 )    Color: Yellow / Appearance: very cloudy / S.010 / pH: x  Gluc: x / Ketone: Negative  / Bili: Negative / Urobili: Negative mg/dL   Blood: x / Protein: 30 mg/dL / Nitrite: Positive   Leuk Esterase: Moderate / RBC: 11-25 /HPF / WBC TNTC /HPF   Sq Epi: x / Non Sq Epi: Few / Bacteria: Moderate      CAPILLARY BLOOD GLUCOSE      POCT Blood Glucose.: 130 mg/dL (22 Mar 2018 22:31)  POCT Blood Glucose.: 122 mg/dL (22 Mar 2018 17:22)  POCT Blood Glucose.: 217 mg/dL (22 Mar 2018 11:30)          Hemoglobin A1C, Whole Blood: 8.1 % ( @ 07:59)    Cholesterol, Serum: 163 mg/dL ( @ 12:36)  HDL Cholesterol, Serum: 42 mg/dL ( @ 12:36)  Triglycerides, Serum: 69 mg/dL ( @ 12:36)      RADIOLOGY & ADDITIONAL TESTS:    Imaging Personally Reviewed:  [y ] YES  [ ] NO    Consultant(s) Notes Reviewed:  [y ] YES  [ ] NO    Care Discussed with Consultants/Other Providers [ ] YES  [ ] NO    PROBLEMS:  HEART FAILURE, ELEVATED TROPONIN LEVEL  SHORTNESS OF BREATH; UNABLE TO AMBULATE  Heart failure  Acute MI  CKD/ARF Cr > 3      Care discussed with family,         [  ]   yes  [  ]  No    imp:    stable[ ]    unstable[  ]     improving [   ]       unchanged  [  ]                Plans:  Continue present plans  [ v ]  PT evaluation  < diuresis bend right: abnormal   Lateral bend left: abnormal   Rotation right: abnormal   Rotation left: abnormal     Other   Sensation: normal      Right Shoulder Exam   Right shoulder exam is normal.      Left Shoulder Exam     Tenderness   The patient is experiencing tenderness in the acromioclavicular joint and biceps tendon.    Range of Motion   Active abduction: abnormal   External rotation: abnormal   Forward flexion: abnormal   Internal rotation 0 degrees: abnormal     Muscle Strength   Abduction: 4/5   Internal rotation: 4/5   External rotation: 4/5   Supraspinatus: 4/5   Biceps: 4/5     Tests   Cross arm: positive  Impingement: positive  Drop arm: positive    Other   Erythema: absent  Scars: absent  Sensation: normal  Pulse: present     Comments:  Notable bicep deformity                 [facility] MRI CERVICAL SPINE 10/24/19   IMPRESSION:    MILD DEGENERATIVE CHANGES WITH MILD CANAL STENOSIS AT THE C3-C4     [facility] MRI LEFT UPPER EXT.  IMPRESSION:  Changes most likely related to chronic rotator cuff tear.            ASSESSMENT:    Diagnoses and all orders for this visit:    DDD (degenerative disc disease), cervical    Acute pain of left shoulder  -     Large Joint Arthrocentesis: L subacromial bursa    Shoulder impingement syndrome, left  -     Large Joint Arthrocentesis: L subacromial bursa    Left shoulder pain, unspecified chronicity  -     Large Joint Arthrocentesis: L subacromial bursa    Other orders  -     levoFLOXacin (LEVAQUIN) 500 MG tablet    Large Joint Arthrocentesis: L glenohumeral  Date/Time: 11/13/2019 1:42 PM  Consent given by: patient  Site marked: site marked  Timeout: Immediately prior to procedure a time out was called to verify the correct patient, procedure, equipment, support staff and site/side marked as required   Supporting Documentation  Indications: pain   Procedure Details  Location: shoulder - L glenohumeral  Preparation: Patient was prepped and draped in the usual sterile fashion  Needle  size: 22 G  Approach: posterior  Medications administered: 2 mL lidocaine PF 1% 1 %; 80 mg triamcinolone acetonide 40 MG/ML  Patient tolerance: patient tolerated the procedure well with no immediate complications              PLAN  I reviewed both the MRI of the cervical spine and the MRI of the shoulder with the patient and his family member recommending proceeding with the injection into the left shoulder since the patient's not ready to proceed with any surgical option.  After the injection the patient left the office without any difficulty and I recommended progression of range of motion activity as tolerated and a possible follow-up with Dr. Cronin to discuss surgical options if the patient is interested.  No Follow-up on file.    RC Arshad

## 2019-12-02 DIAGNOSIS — M54.12 CERVICAL RADICULAR PAIN: ICD-10-CM

## 2019-12-04 RX ORDER — HYDROCODONE BITARTRATE AND ACETAMINOPHEN 7.5; 325 MG/1; MG/1
1 TABLET ORAL 2 TIMES DAILY PRN
Qty: 60 TABLET | Refills: 0 | Status: SHIPPED | OUTPATIENT
Start: 2019-12-07 | End: 2020-04-27

## 2019-12-06 ENCOUNTER — HOSPITAL ENCOUNTER (OUTPATIENT)
Dept: PAIN MANAGEMENT | Age: 84
Discharge: HOME | End: 2019-12-06
Payer: MEDICARE

## 2019-12-06 VITALS
HEART RATE: 70 BPM | TEMPERATURE: 97 F | DIASTOLIC BLOOD PRESSURE: 74 MMHG | SYSTOLIC BLOOD PRESSURE: 132 MMHG | OXYGEN SATURATION: 94 % | RESPIRATION RATE: 20 BRPM

## 2019-12-06 DIAGNOSIS — M54.12 CERVICAL NEURALGIA: ICD-10-CM

## 2019-12-06 PROCEDURE — 2580000003 HC RX 258

## 2019-12-06 PROCEDURE — 3209999900 FLUORO FOR SURGICAL PROCEDURES

## 2019-12-06 PROCEDURE — 62323 NJX INTERLAMINAR LMBR/SAC: CPT

## 2019-12-06 PROCEDURE — 2500000003 HC RX 250 WO HCPCS

## 2019-12-06 PROCEDURE — 6360000002 HC RX W HCPCS

## 2019-12-06 PROCEDURE — 62321 NJX INTERLAMINAR CRV/THRC: CPT

## 2019-12-06 RX ORDER — LIDOCAINE HYDROCHLORIDE 10 MG/ML
INJECTION, SOLUTION EPIDURAL; INFILTRATION; INTRACAUDAL; PERINEURAL
Status: COMPLETED | OUTPATIENT
Start: 2019-12-06 | End: 2019-12-06

## 2019-12-06 RX ORDER — SODIUM CHLORIDE 9 MG/ML
INJECTION INTRAVENOUS
Status: COMPLETED | OUTPATIENT
Start: 2019-12-06 | End: 2019-12-06

## 2019-12-06 RX ORDER — METHYLPREDNISOLONE ACETATE 80 MG/ML
INJECTION, SUSPENSION INTRA-ARTICULAR; INTRALESIONAL; INTRAMUSCULAR; SOFT TISSUE
Status: COMPLETED | OUTPATIENT
Start: 2019-12-06 | End: 2019-12-06

## 2019-12-06 RX ADMIN — SODIUM CHLORIDE 5 ML: 9 INJECTION INTRAVENOUS at 10:43

## 2019-12-06 RX ADMIN — METHYLPREDNISOLONE ACETATE 80 MG: 80 INJECTION, SUSPENSION INTRA-ARTICULAR; INTRALESIONAL; INTRAMUSCULAR; SOFT TISSUE at 10:44

## 2019-12-06 RX ADMIN — LIDOCAINE HYDROCHLORIDE 5 ML: 10 INJECTION, SOLUTION EPIDURAL; INFILTRATION; INTRACAUDAL; PERINEURAL at 10:43

## 2019-12-06 ASSESSMENT — PAIN DESCRIPTION - LOCATION: LOCATION: NECK

## 2019-12-06 ASSESSMENT — PAIN - FUNCTIONAL ASSESSMENT: PAIN_FUNCTIONAL_ASSESSMENT: 0-10

## 2019-12-06 ASSESSMENT — PAIN DESCRIPTION - PAIN TYPE: TYPE: CHRONIC PAIN

## 2019-12-10 ENCOUNTER — TELEPHONE (OUTPATIENT)
Dept: PAIN MANAGEMENT | Age: 84
End: 2019-12-10

## 2020-01-06 ENCOUNTER — HOSPITAL ENCOUNTER (OUTPATIENT)
Dept: PAIN MANAGEMENT | Age: 85
Discharge: HOME | End: 2020-01-06
Payer: MEDICARE

## 2020-01-06 VITALS
RESPIRATION RATE: 16 BRPM | OXYGEN SATURATION: 97 % | DIASTOLIC BLOOD PRESSURE: 62 MMHG | BODY MASS INDEX: 24.52 KG/M2 | HEIGHT: 72 IN | TEMPERATURE: 97 F | HEART RATE: 72 BPM | WEIGHT: 181 LBS | SYSTOLIC BLOOD PRESSURE: 111 MMHG

## 2020-01-06 PROBLEM — R52 PAIN MANAGEMENT: Status: ACTIVE | Noted: 2020-01-06

## 2020-01-06 PROCEDURE — 99213 OFFICE O/P EST LOW 20 MIN: CPT

## 2020-01-06 PROCEDURE — 99214 OFFICE O/P EST MOD 30 MIN: CPT | Performed by: NURSE PRACTITIONER

## 2020-01-06 RX ORDER — GABAPENTIN 100 MG/1
100 CAPSULE ORAL 2 TIMES DAILY
Qty: 180 CAPSULE | Refills: 0 | Status: SHIPPED | OUTPATIENT
Start: 2020-01-25 | End: 2020-04-27

## 2020-01-06 RX ORDER — HYDROCODONE BITARTRATE AND ACETAMINOPHEN 7.5; 325 MG/1; MG/1
1 TABLET ORAL 2 TIMES DAILY PRN
Qty: 60 TABLET | Refills: 0 | Status: SHIPPED | OUTPATIENT
Start: 2020-01-06 | End: 2020-02-04 | Stop reason: SDUPTHER

## 2020-01-06 ASSESSMENT — ENCOUNTER SYMPTOMS
EYE REDNESS: 0
DIARRHEA: 0
EYE PAIN: 0
VOMITING: 0
SHORTNESS OF BREATH: 0
SORE THROAT: 0
WHEEZING: 0
CONSTIPATION: 0

## 2020-01-06 ASSESSMENT — PAIN - FUNCTIONAL ASSESSMENT: PAIN_FUNCTIONAL_ASSESSMENT: PREVENTS OR INTERFERES SOME ACTIVE ACTIVITIES AND ADLS

## 2020-01-06 ASSESSMENT — ACTIVITIES OF DAILY LIVING (ADL): EFFECT OF PAIN ON DAILY ACTIVITIES: LIMITS ACTIVITIES

## 2020-01-06 ASSESSMENT — PAIN DESCRIPTION - ORIENTATION: ORIENTATION: LEFT;UPPER

## 2020-01-06 ASSESSMENT — PAIN DESCRIPTION - DIRECTION: RADIATING_TOWARDS: DOWN LEFT ARM

## 2020-01-06 ASSESSMENT — PAIN DESCRIPTION - PROGRESSION: CLINICAL_PROGRESSION: NOT CHANGED

## 2020-01-06 ASSESSMENT — PAIN DESCRIPTION - LOCATION: LOCATION: NECK;ARM

## 2020-01-06 ASSESSMENT — PAIN DESCRIPTION - DESCRIPTORS: DESCRIPTORS: ACHING;CONSTANT;RADIATING

## 2020-01-06 ASSESSMENT — PAIN DESCRIPTION - FREQUENCY: FREQUENCY: CONTINUOUS

## 2020-01-06 ASSESSMENT — PAIN DESCRIPTION - PAIN TYPE: TYPE: CHRONIC PAIN

## 2020-01-06 ASSESSMENT — PAIN DESCRIPTION - ONSET: ONSET: ON-GOING

## 2020-01-06 ASSESSMENT — PAIN SCALES - GENERAL: PAINLEVEL_OUTOF10: 9

## 2020-01-06 NOTE — PROGRESS NOTES
[facility] Physical & Pain Medicine  Office Note    Patient Name: Adam Pacheco    MR #: [id number]    Account #: [de-identified]    : 1935    Age: 82 y.o. Sex: male    Date: 2020    PCP: REJI Lomas NP    Chief Complaint:   Chief Complaint   Patient presents with    Neck Pain     down left arm       History of Present Illness:     Adam Pacheco is a 82 y.o. male who presents to the office for chronic cervical neck pain with left arm radicular. Patient reports cervical epidural C5-C6 2019 greater than 70% lasting greater than 3 weeks and begin a decline. Uses Norco 7.5 twice daily #60 no aberrancy. Pt has seen Isiah Saavedra for left shoulder pain- steroid injection effective. Pt in room with \"Deysi\". Past Visit HPI: I read last pain management note    Current PE.3    Past PE    Annual Screens:PCP   ORT Score: 1    PHQ-9 Score: 3    Current Pain Assessment  Pain Assessment  Pain Assessment: 0-10  Pain Level: 9  Patient's Stated Pain Goal: No pain  Pain Type: Chronic pain  Pain Location: Neck, Arm  Pain Orientation: Left, Upper  Pain Radiating Towards: down left arm  Pain Descriptors: Aching, Constant, Radiating  Pain Frequency: Continuous  Pain Onset: On-going  Clinical Progression: Not changed  Effect of Pain on Daily Activities: limits activities  Functional Pain Assessment: Prevents or interferes some active activities and ADLs  Non-Pharmaceutical Pain Intervention(s): Rest  Response to Pain Intervention: Patient Satisfied  Multiple Pain Sites: No    Employment:     Past Medical Histoy  Past Medical History:   Diagnosis Date    Arthritis     Shoulder impingement     left       Surgery History  Past Surgical History:   Procedure Laterality Date    FINGER SURGERY Right         Family History  family history is not on file.     Social History    Social History     Tobacco Use    Smoking status: Former Smoker    Smokeless tobacco: Current User and suicidal ideas. 14 point ROS negative besides that noted in HPI    Physical Exam:    Vitals:    01/06/20 1324   BP: 111/62   Pulse: 72   Resp: 16   Temp: 97 °F (36.1 °C)   TempSrc: Oral   SpO2: 97%   Weight: 181 lb (82.1 kg)   Height: 6' (1.829 m)       Body mass index is 24.55 kg/m². Physical Exam  Vitals signs and nursing note reviewed. Constitutional:       General: He is not in acute distress. Appearance: He is well-developed. He is not diaphoretic. HENT:      Head: Normocephalic. Right Ear: External ear normal.      Left Ear: External ear normal.      Nose: Nose normal.      Mouth/Throat:      Mouth: Mucous membranes are moist.      Pharynx: Oropharynx is clear. Eyes:      General:         Right eye: No discharge. Extraocular Movements: Extraocular movements intact. Conjunctiva/sclera: Conjunctivae normal.      Pupils: Pupils are equal, round, and reactive to light. Neck:      Musculoskeletal: Normal range of motion and neck supple. Muscular tenderness present. No neck rigidity. Cardiovascular:      Rate and Rhythm: Normal rate and regular rhythm. Pulses: Normal pulses. Pulmonary:      Effort: Pulmonary effort is normal. No respiratory distress. Breath sounds: Normal breath sounds. No wheezing. Abdominal:      General: Bowel sounds are normal.      Palpations: Abdomen is soft. Tenderness: There is no tenderness. There is no guarding. Musculoskeletal:         General: Tenderness present. Cervical back: He exhibits decreased range of motion and tenderness. Back:    Skin:     General: Skin is warm and dry. Findings: No erythema or rash (cervical injection site clear). Neurological:      Mental Status: He is alert and oriented to person, place, and time.       Coordination: Coordination normal.      Gait: Gait normal.      Deep Tendon Reflexes:      Reflex Scores:       Tricep reflexes are 2+ on the right side and 2+ on the left Provider:  Review of Honorio Guadalupe / Agreement Review / ORT Calculated / PHQ-9 Reviewed / Compliance Issues Discussed / Cognitive Behavior Needs / Exercise / Review of Test / Financial Issues / Teaching / Smoking Cessation / Tobacco/Alcohol Use    Controlled Substance Monitoring:   Discussed with patient possible medication side effects, risk of tolerance, dependenceand alternative treatments. Discussed the growing epidemic in the [place] with the overprescribing and at times the abuse of narcotics. Discussed the detrimental effects of long term narcotic use in younger patients. Patientencouraged to set daily goals of exercising and if on narcotics decreasing daily narcotic intake. Discussed with the patient about the development of hyperalgesia with long term narcotic intake. CC:  REJI Rodgers - REJI Zhu, 1/7/2020 at 8:25 AM    EMR dragon/transcription disclaimer: Much of this encounter note is electronic transcription/translation of spoken language to printed tach. Electronic translation of spoken language may be erroneous, or at times, nonsensical words or phrases may be inadvertently transcribed.  Although, I have reviewed the note for such errors, some may still exist.

## 2020-01-06 NOTE — PROGRESS NOTES
Prescription Ordered/Changed [] Blood Thinner Request Form  [] Obtained Test Results / Consult Notes  [] UDS due at next visit, verified per EPIC      [] Suspected Physical Abuse or Suicide Risk assessed - IF YES COMPLETE QUESTIONS BELOW    If any of the following questions are answered yes - contact attending physician for referral:    Has been considering harming self to escape stress, pain problems? [] YES  [] NO  Has a suicide plan? [] YES  [] NO  Has attempted suicide in the past?   [] YES  [] NO  Has a close friend or family member who committed suicide?   [] YES  [] NO    Assessment Completed by:  Electronically signed by Adnria Birch RN on 1/6/2020 at 2:04 PM

## 2020-01-07 ASSESSMENT — ENCOUNTER SYMPTOMS: CHEST TIGHTNESS: 0

## 2020-01-23 RX ORDER — EMOLLIENT BASE
CREAM (GRAM) TOPICAL
Qty: 300 G | Refills: 5 | Status: SHIPPED | OUTPATIENT
Start: 2020-01-23 | End: 2020-10-07 | Stop reason: SDUPTHER

## 2020-02-06 RX ORDER — HYDROCODONE BITARTRATE AND ACETAMINOPHEN 7.5; 325 MG/1; MG/1
1 TABLET ORAL 2 TIMES DAILY PRN
Qty: 60 TABLET | Refills: 0 | Status: SHIPPED | OUTPATIENT
Start: 2020-02-06 | End: 2020-03-02 | Stop reason: SDUPTHER

## 2020-03-03 RX ORDER — HYDROCODONE BITARTRATE AND ACETAMINOPHEN 7.5; 325 MG/1; MG/1
1 TABLET ORAL 2 TIMES DAILY PRN
Qty: 60 TABLET | Refills: 0 | Status: SHIPPED | OUTPATIENT
Start: 2020-03-07 | End: 2020-04-06 | Stop reason: SDUPTHER

## 2020-03-06 ENCOUNTER — HOSPITAL ENCOUNTER (OUTPATIENT)
Dept: PAIN MANAGEMENT | Age: 85
Discharge: HOME | End: 2020-03-06
Payer: MEDICARE

## 2020-03-06 VITALS
SYSTOLIC BLOOD PRESSURE: 113 MMHG | HEART RATE: 76 BPM | OXYGEN SATURATION: 97 % | RESPIRATION RATE: 20 BRPM | DIASTOLIC BLOOD PRESSURE: 78 MMHG | TEMPERATURE: 96.8 F

## 2020-03-06 PROCEDURE — 6360000002 HC RX W HCPCS

## 2020-03-06 PROCEDURE — 2500000003 HC RX 250 WO HCPCS

## 2020-03-06 PROCEDURE — 3209999900 FLUORO FOR SURGICAL PROCEDURES

## 2020-03-06 PROCEDURE — 2580000003 HC RX 258

## 2020-03-06 PROCEDURE — 62321 NJX INTERLAMINAR CRV/THRC: CPT

## 2020-03-06 RX ORDER — SODIUM CHLORIDE 9 MG/ML
INJECTION INTRAVENOUS
Status: COMPLETED | OUTPATIENT
Start: 2020-03-06 | End: 2020-03-06

## 2020-03-06 RX ORDER — LIDOCAINE HYDROCHLORIDE 10 MG/ML
INJECTION, SOLUTION EPIDURAL; INFILTRATION; INTRACAUDAL; PERINEURAL
Status: COMPLETED | OUTPATIENT
Start: 2020-03-06 | End: 2020-03-06

## 2020-03-06 RX ORDER — METHYLPREDNISOLONE ACETATE 80 MG/ML
INJECTION, SUSPENSION INTRA-ARTICULAR; INTRALESIONAL; INTRAMUSCULAR; SOFT TISSUE
Status: COMPLETED | OUTPATIENT
Start: 2020-03-06 | End: 2020-03-06

## 2020-03-06 RX ADMIN — LIDOCAINE HYDROCHLORIDE 5 ML: 10 INJECTION, SOLUTION EPIDURAL; INFILTRATION; INTRACAUDAL; PERINEURAL at 09:38

## 2020-03-06 RX ADMIN — SODIUM CHLORIDE 5 ML: 9 INJECTION INTRAVENOUS at 09:38

## 2020-03-06 RX ADMIN — METHYLPREDNISOLONE ACETATE 80 MG: 80 INJECTION, SUSPENSION INTRA-ARTICULAR; INTRALESIONAL; INTRAMUSCULAR; SOFT TISSUE at 09:39

## 2020-03-06 ASSESSMENT — ACTIVITIES OF DAILY LIVING (ADL): EFFECT OF PAIN ON DAILY ACTIVITIES: LIMITS ACTIVITIES

## 2020-03-06 ASSESSMENT — PAIN - FUNCTIONAL ASSESSMENT
PAIN_FUNCTIONAL_ASSESSMENT: 0-10
PAIN_FUNCTIONAL_ASSESSMENT: PREVENTS OR INTERFERES SOME ACTIVE ACTIVITIES AND ADLS
PAIN_FUNCTIONAL_ASSESSMENT: 0-10

## 2020-03-09 ENCOUNTER — TELEPHONE (OUTPATIENT)
Dept: PAIN MANAGEMENT | Age: 85
End: 2020-03-09

## 2020-04-06 RX ORDER — HYDROCODONE BITARTRATE AND ACETAMINOPHEN 7.5; 325 MG/1; MG/1
1 TABLET ORAL 2 TIMES DAILY PRN
Qty: 60 TABLET | Refills: 0 | Status: SHIPPED | OUTPATIENT
Start: 2020-04-06 | End: 2020-04-27 | Stop reason: SDUPTHER

## 2020-04-28 RX ORDER — HYDROCODONE BITARTRATE AND ACETAMINOPHEN 7.5; 325 MG/1; MG/1
1 TABLET ORAL 2 TIMES DAILY PRN
Qty: 60 TABLET | Refills: 0 | Status: SHIPPED | OUTPATIENT
Start: 2020-05-06 | End: 2020-06-04 | Stop reason: SDUPTHER

## 2020-04-30 RX ORDER — GABAPENTIN 100 MG/1
100 CAPSULE ORAL 2 TIMES DAILY
Qty: 180 CAPSULE | Refills: 0 | Status: SHIPPED | OUTPATIENT
Start: 2020-04-30 | End: 2020-07-15 | Stop reason: SDUPTHER

## 2020-06-05 RX ORDER — HYDROCODONE BITARTRATE AND ACETAMINOPHEN 7.5; 325 MG/1; MG/1
1 TABLET ORAL 2 TIMES DAILY PRN
Qty: 60 TABLET | Refills: 0 | Status: SHIPPED | OUTPATIENT
Start: 2020-06-05 | End: 2020-07-06 | Stop reason: SDUPTHER

## 2020-07-06 RX ORDER — HYDROCODONE BITARTRATE AND ACETAMINOPHEN 7.5; 325 MG/1; MG/1
1 TABLET ORAL 2 TIMES DAILY PRN
Qty: 18 TABLET | Refills: 0 | Status: SHIPPED | OUTPATIENT
Start: 2020-07-06 | End: 2020-07-15 | Stop reason: SDUPTHER

## 2020-07-15 ENCOUNTER — HOSPITAL ENCOUNTER (OUTPATIENT)
Dept: PAIN MANAGEMENT | Age: 85
Discharge: HOME | End: 2020-07-15
Payer: MEDICARE

## 2020-07-15 VITALS
HEART RATE: 77 BPM | HEIGHT: 72 IN | BODY MASS INDEX: 24.99 KG/M2 | TEMPERATURE: 99.5 F | WEIGHT: 184.5 LBS | DIASTOLIC BLOOD PRESSURE: 76 MMHG | SYSTOLIC BLOOD PRESSURE: 135 MMHG | OXYGEN SATURATION: 96 %

## 2020-07-15 PROBLEM — M62.830 MUSCLE SPASM OF BACK: Status: ACTIVE | Noted: 2020-07-15

## 2020-07-15 PROBLEM — M62.838 MUSCLE SPASMS OF NECK: Status: ACTIVE | Noted: 2020-07-15

## 2020-07-15 PROCEDURE — 99214 OFFICE O/P EST MOD 30 MIN: CPT | Performed by: NURSE PRACTITIONER

## 2020-07-15 PROCEDURE — 99215 OFFICE O/P EST HI 40 MIN: CPT

## 2020-07-15 RX ORDER — HYDROCODONE BITARTRATE AND ACETAMINOPHEN 7.5; 325 MG/1; MG/1
1 TABLET ORAL 2 TIMES DAILY PRN
Qty: 60 TABLET | Refills: 0 | Status: SHIPPED | OUTPATIENT
Start: 2020-07-15 | End: 2020-08-04 | Stop reason: SDUPTHER

## 2020-07-15 RX ORDER — GABAPENTIN 100 MG/1
100 CAPSULE ORAL 2 TIMES DAILY
Qty: 180 CAPSULE | Refills: 0 | Status: SHIPPED | OUTPATIENT
Start: 2020-07-15 | End: 2020-11-16 | Stop reason: SDUPTHER

## 2020-07-15 ASSESSMENT — PAIN DESCRIPTION - ORIENTATION: ORIENTATION: UPPER

## 2020-07-15 ASSESSMENT — PAIN DESCRIPTION - LOCATION: LOCATION: NECK;SHOULDER

## 2020-07-15 ASSESSMENT — PAIN SCALES - GENERAL: PAINLEVEL_OUTOF10: 5

## 2020-07-15 NOTE — PROGRESS NOTES
Nursing Admission Record    Current Issues / Falls / ER Visits:  New provider H&P    Was Percentage of Pain Relief after Cervical Epidural C5-6 on 3/6/2020:  60 %    How long lasted:  6 weeks Yes  Were you able to decrease pain medication after treatment? Yes  Were you able to increase activity after treatment? Yes  Did you have any adverse reactions to treatment? No    Opioids Prescribed: Norco 7.5mg BID PRN #60    Was Medication Brought to Appt: No     Hx of any Neck/Back Surgeries? None     Is Patient currently taking a blood thinner? None     MRI exams received in the past 2 years:  Yes MRI Cervical Spine & MRI Left Shoulder Injection        When 10/2019                                              Where [facility]       Imaging on chart: Yes         Imaging records requested: No     CT exam received during the last 12 months: No       When na                                              Where na       Imaging on chart: No         Imaging records requested: No     X-ray exam received during the last 12 months: Yes XR Cervical Spine & XR Left Shoulder       When /2019                                              Where [facility]       Imaging on chart: Yes         Imaging records requested: No     Nerve Conduction Study/EMG:  No       When na                                              Where na       Imaging on chart: No         Imaging records requested:  No     Physical therapy during the last 6 months: No       When: na                                               Where  na     Labs during the last 12 months: No       When: na                                             Where  na    PEG Score: 8.7    Last PEG Score: 8.3    Annual ORT Score: 0    Annual PHQ Score: 12    Last UDS Results: UDS Today    Education Provided:  [x] Review of Fritz  [x] Agreement Review  [x] PEG Score Calculated [x] PHQ Score Calculated [x] ORT Score Calculated    [] Compliance Issues Discussed [] Cognitive Behavior Needs [x] Exercise [] Review of Test [] Financial Issues  [x] Tobacco/Alcohol Use Reviewed [x] Teaching [] New Patient [] Picture Obtained    Physician Plan:  [] Outgoing Referral  [] Pharmacy Consult  [] Test Ordered [x] Prescription Ordered/Changed [] Blood Thinner Request Form  [] Obtained Test Results / Consult Notes  [] UDS due at next visit, verified per EPIC      [] Suspected Physical Abuse or Suicide Risk assessed - IF YES COMPLETE QUESTIONS BELOW    If any of the following questions are answered yes - contact attending physician for referral:    Has been considering harming self to escape stress, pain problems? [] YES  [] NO  Has a suicide plan? [] YES  [] NO  Has attempted suicide in the past?   [] YES  [] NO  Has a close friend or family member who committed suicide?   [] YES  [] NO    Assessment Completed by:  Electronically signed by Terrie Prado RN on 7/15/2020 at 2:07 PM

## 2020-07-15 NOTE — H&P
[facility] Physical and Pain Medicine    Office Progress Note    Patient Name: Shahrzad Armendariz    MR #: [id number]    Account #: [de-identified]    : 1935    Age: 82 y.o. Sex: male    Date: July 15, 2020    PCP: REJI Deluca NP         Referring Provider:    Chief Complaint:   Chief Complaint   Patient presents with    Neck Pain    Shoulder Pain       History of Present Illness:    Shahrzad Armendariz is a 82 y.o. male who presents to the office for follow-up of neck and left shoulder pain. He received an KAITLYNN and says that he did not get much relief. Only 1 week at 40%. He does not want another. He also is seeing an ortho in [facility] and he recently moved to [facility]. Daughter says that it is really far to drive from [facility] to [facility]. He is not going to have surgery on his shoulder and had received a left shoulder injection with 60% relief for 6 weeks. This was back in March. He also says that his low back hurts and is worse after sitting for a period of time. Says that it feels like it is pulling. He continues to take the Norco with some relief. Also on Gabapentin    Last pain management HPI note read    Employment: Retired []   Disabled  []   Works []    Does Not Work [x]     Previous Injury:  Yes  []   No [x]     Previous Surgery: Yes []   No [x]     Previous Physical Therapy In the last 6 months? Yes  []     No [x]   Did Physical Therapy make thepain better or worse? Better []   Worse []  Unchanged []    MRI in the last two years? Yes [x]  No []   Results reviewed with patient? Yes []   No []    CT Scan in the last two years? Yes  []   No [x]   Results reviewed with patient? Yes []   No []    X-ray in the last two years? Yes [x]   No  []  Results reviewed with patient? Yes  []  No []    Injections in the past?  Yes [x]   No []   Did the injections help relieve the pain? Yes []   No [x]     Do you have Depression?   Yes  []    No [x]   Thinking of Cardiovascular: denies chest pain, irregular heartbeats, palpitations, shortness of breath, or edema in legs  Gastrointestinal: denies, nausea, vomiting, heartburn, diarrhea, or constipation  Genitourinary: denies difficult urination, pain or burning with urination, blood in the urine, or cloudy urine  Musculoskeletal: denies arm, buttock, thigh or calf cramps. Has pain in neck, bilateral shoulders and low back, muscle spasms in neck, bilateral shoulders and low back and tenderness in neck, bilateral shoulder and low back. No muscle weakness. No joint swelling. Neurologic: headache, dizziness, fainting, loss of consciousness, no memory loss. No sensitivity. Endocrine: denies intolerance to hot or cold temperature, night sweats, flushing, fingernail changes, increased thirst, or hairloss   Hematologic/ Lymphatic: denies anemia, bleeding tendency or clotting tendency, bruising easily. Allergic/ Immunologic: denies rhinitis, asthma, skin sensitivity, or allergy to Latex   Psychiatric: denies depression or thoughts of suicide, or voices in head. Current Pain Assessment:   Pain Assessment  Pain Assessment: 0-10  Pain Level: 5  Pain Location: Neck, Shoulder  Pain Orientation: Upper    Clinical Progression: gradually improving  Effect of Pain on Daily Activities: limits activity  Patient's Stated Pain Goal: No pain  Pain Intervention(s): Medication (see eMar), Repositioning, Rest, Ice    Current PE.7    Past PE.3    Annual Screens:    ORT Score: 0    PHQ-9 Score: 12    Physical Exam:    Vitals:    07/15/20 1417   BP: 135/76   Pulse: 77   Temp: 99.5 °F (37.5 °C)   SpO2: 96%   Weight: 184 lb 8 oz (83.7 kg)   Height: 6' (1.829 m)       Body mass index is 25.02 kg/m². General Appearance: no acute distress.  Appears to be well dressed  Skin Exam: Warm and dry, no jaundice, rashes or lesions  Head Exam: NCAT, PERRLA, EOMI, moist mucus membranes, scalp normal  Neck Exam: Supple, no masses palpated, trachea midline. Tender in neck and bilateral shoulders with palpaiton  Lung: Clear to ausculation in all lobes anterior and posterior. Heart[de-identified] Regular rate and rhythm, no gallops, rubs or murmurs, no edema  Abdomen: Bowel sounds in all quadrants, soft, non-distended, non-tender with palpation, no guarding  Extremities: No rash, cyanosis or bruising  Musculoskeletal: No joint swelling or deformity   Back Exam: Tender with palpation of Paraspinous muscles of Low back  Hip Exam: Full rotation bilateral   Knee Exam: Full flexion and extension bilateral, no crepitus  Shoulder Exam: Full ROM bilateral  Neurologic Exam: Gait and coordination normal, speech normal  Reflexes: Normal brachialis, Negative Garcia's bilateral. Normal Patellar bilateral,   CN EXAM: II-XII intact, face symmetrical, tongue symmetrical, the trapezius and sternocleidomastoid muscle appearance and strength symmetrical, normal achilles bilateral, ankle clonus negative bilateral  Strength: 5/5 RUE Bi's/Tri's, 5/5 LUE Bi's/Tri's, 5/5 RLE knee flex/ext, 5/5 RLE DF/PF, 5/5 LLE knee flex/ext, 5/5 LLE DF/PF  Sensation: Equal and intact to fine touch in all extremities  Mood and affect: Normal   Nurses note reviewed along with current vital signs    Active Problem(s)  Active Problems:    Chronic left shoulder pain    Myofascial muscle pain    Cervicalgia    Muscle spasms of neck    Muscle spasm of back  Resolved Problems:    * No resolved hospital problems. *                                                                                                                            PLAN:  1. Patient is to call the office with any questions or concerns that may arise prior to next appointment. 2. Continue Norco, Gabapentin and [place] clarice  3.  Schedule patient for bilateral cervical trigger point injections and left shoulder injection  4. 2 weeks after the above schedule patient for bilateral lumbar trigger point injections    Urine Drug Screen Current/Today: Yes  [x]  No []     Discussion:  Discussed exam findings and plan of care with patient. Patient agreed with the current plan of care at this time. All questions from the patient were answered by the provider. Activity:   Discussed exercise as beneficial to pain reduction, encouraged stretching exercise with a focus on torso strengthening. Education Provided:  Review of [place] [x] Agreement Review [x]  Reviewed PHQ-9  [x]    Review of Test [] Compliance Issues Discussed []   Cognitive Behavior Needs [] Exercise [x]  Financial Issues []   Tobacco/Alcohol Use [] Teaching  [x]     Goal:  Pain Management Goals of Therapy:   []        Resolution in pain  [x]        Decrease in pain level  [x]        Improvement in ADL's  [x]        Increase in activities with less pain  [x]        Decrease in medication      [] Benzodiazapine's and Narcotics:  Patient educated on the possible effects of combining Benzodiazapine's and Opioids. Explained \"Black Box Warnings\" such as; possible suppressed breathing, hypoxia, anoxia, depressed cognition, heart arrhythmia, coma and possible death. Patient verbalized understanding concerning possible effects. Controlled Substance Monitoring:   Discussed with patient possible medication side effects, risk of tolerance, dependence and alternative treatments. Discussed thegrowing epidemic in the [place] with the overprescribing and at times the abuse of narcotics. Discussed the detrimental effects of long term narcotic use. Patient encouraged to set daily goals of exercising and decreasing dailynarcotic intake. Discussed with the patient about the development of hyperalgesia with long term narcotic intake. EMR dragon/transcription disclaimer: Much of this encounter note is electronic transcription/translation of spoken language to printed tach. Electronic translation of spoken language may be erroneous, or at times, nonsensical words or phrases may be inadvertently transcribed. Although, I have reviewed the note for such errors, some may still exist.     CC:  Jocelyn Dow, REJI - REJI Sebastian, 7/15/2020 at 3:06 PM

## 2020-07-30 ENCOUNTER — HOSPITAL ENCOUNTER (OUTPATIENT)
Dept: PAIN MANAGEMENT | Age: 85
Discharge: HOME | End: 2020-07-30
Payer: MEDICARE

## 2020-07-30 PROCEDURE — 20610 DRAIN/INJ JOINT/BURSA W/O US: CPT | Performed by: NURSE PRACTITIONER

## 2020-07-30 PROCEDURE — 6360000002 HC RX W HCPCS

## 2020-07-30 PROCEDURE — 20553 NJX 1/MLT TRIGGER POINTS 3/>: CPT

## 2020-07-30 PROCEDURE — 20610 DRAIN/INJ JOINT/BURSA W/O US: CPT

## 2020-07-30 PROCEDURE — 2500000003 HC RX 250 WO HCPCS

## 2020-07-30 PROCEDURE — 20553 NJX 1/MLT TRIGGER POINTS 3/>: CPT | Performed by: NURSE PRACTITIONER

## 2020-07-30 RX ORDER — LIDOCAINE HYDROCHLORIDE 10 MG/ML
1 INJECTION, SOLUTION EPIDURAL; INFILTRATION; INTRACAUDAL; PERINEURAL ONCE
Status: DISCONTINUED | OUTPATIENT
Start: 2020-07-30 | End: 2020-08-01 | Stop reason: HOSPADM

## 2020-07-30 RX ORDER — LIDOCAINE HYDROCHLORIDE 10 MG/ML
10 INJECTION, SOLUTION EPIDURAL; INFILTRATION; INTRACAUDAL; PERINEURAL ONCE
Status: DISCONTINUED | OUTPATIENT
Start: 2020-07-30 | End: 2020-08-01 | Stop reason: HOSPADM

## 2020-07-30 RX ORDER — BUPIVACAINE HYDROCHLORIDE 5 MG/ML
10 INJECTION, SOLUTION EPIDURAL; INTRACAUDAL ONCE
Status: DISCONTINUED | OUTPATIENT
Start: 2020-07-30 | End: 2020-08-01 | Stop reason: HOSPADM

## 2020-07-30 RX ORDER — TRIAMCINOLONE ACETONIDE 40 MG/ML
40 INJECTION, SUSPENSION INTRA-ARTICULAR; INTRAMUSCULAR ONCE
Status: DISCONTINUED | OUTPATIENT
Start: 2020-07-30 | End: 2020-08-01 | Stop reason: HOSPADM

## 2020-07-30 RX ORDER — BUPIVACAINE HYDROCHLORIDE 5 MG/ML
1 INJECTION, SOLUTION EPIDURAL; INTRACAUDAL ONCE
Status: DISCONTINUED | OUTPATIENT
Start: 2020-07-30 | End: 2020-08-01 | Stop reason: HOSPADM

## 2020-07-31 NOTE — PROCEDURES
[facility] Physical and Pain Medicine      Patient Name: Amber Centeno    : 1935                    Age: 82 y.o. Sex: male    Date: 2020    Pre-op Diagnosis: Myofascial Pain/ Muscle Spasms/ Cervicalgia    Post-op Diagnosis: Myofascial Pain/ Muscle Spasms/ Cervicalgia    Procedure: Cervical Trigger Point Injections    Performing Procedure: Arta Mcburney, MSN, APRN, FNP-C    Previously Had Procedure:  Yes []  No [x]     No data found. Description of Procedure:     After a brief physical assessment and failure to improve with conservative measures the patient presented for Cervical Trigger Point Injections The indications, limitations and possible complications were discussed with the patient and the patient elected to proceed with the procedure. After voluntary, informed and signed consent obtained the patient was placed in a seated position. Appropriate time out was obtained per policy. The area of maximal tenderness was palpated over the [] Right   []  Left   [x]  Bilateral Cervical   [x] Splenius   [x] Trapezius  [x] Rhomboid. The skin overlying these areas was marked with a skin marker. The skin overlying the proposed injection sites were then sprayed with Gebauer's Solution while protecting patient eyes. The areas were then prepped in a sterile fashion with Prevantics swab. Each trigger point of the  [] Right   []   Left  [x]   Bilateral Cervical  [x] Splenius  [x] Trapezius   [x] Rhomboid   was then injected after negative aspiration using a 25 gauge 1 1/2 in needle with approximately 2 ml of a solution of     [x] 5 ml of 1% Lidocaine Plain and 5 ml of 0.5% Marcaine Plain per 10 ml syringe  [] Toradol 0.5 ml (30 mg/ml) per 10 ml syringe    Sterile dressings applied. ,     [facility] Physical and Pain Medicine        Patient Name: Amber Centeno    : 1935    Age: 82 y.o.     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 Description of Procedure:    After a brief physical assessment and failure to improve with conservative measures the patient presented for an injection of the [] Right  [x] Left   [] Bilateral Shoulder(s). The indications, limitations and possible complications were discussed with the patient and the patient elected to proceed with the procedure. [] Right Shoulder    After voluntary, informed and signed consent obtained the patient was placed in a sitting position with the patients right arm placed to the side and elbow flexed at 90 degrees. Appropriate time out was obtained per policy. The proposed injection site was palpated at the point of maximal tenderness [] Anterior  [] Posterior [] Lateral and the skin over the proposed injection entry point was marked. The skin was then sprayed with Gebauer's Solution while protecting patients eyes and prepped in a sterile fashion with Prevantics swab. Under sterile technique a 22 gauge 1 1/2 inch needle was introduced and after a negative aspiration a solution of 1 ml of 0.5% Marcaine Plain, 1 ml of 1% Lidocaine Plain and       [] 1 ml of Kenalog (40mg/ml)   [] Toradol 30 mg/ml was injected. The needle was withdrawn and a sterile dressing applied. [x] Left Shoulder     After voluntary, informed and signed consent obtained the patient was placed in a sitting position with the patients left arm placed to the side and elbow flexed at 90 degrees. Appropriate time out was obtained per policy.     The proposed injection site was palpated at the point of maximal tenderness  [] Anterior  [x] Posterior [] Lateral and the skin over the proposed injection entry point was marked. The skin was then sprayed with Gebauer's Solution while protecting patients eyes and prepped in a sterile fashion with Prevantics swab. Under sterile technique a 22 gauge 1 1/2 inch needle was introduced and after a negative aspiration a solution of 1 ml of 0.5% Marcaine Plain, 1 ml of 1% Lidocaine Plain and     [x] 1 ml of Kenalog (40mg/ml)   [] Toradol 30 mg/ml was injected. The needle was withdrawn and a sterile dressing applied. Discharge: The patient tolerated the procedure well. There were no complications during the procedure and the patient was discharged home with discharge instructions. The patient has been instructed to contact the office should there be any complications or questions to arise between today and their next appointment.     Plan:  [] Will return to the office in   [] 1 month  [] 4 - 6 weeks   [] 2 months   []  3 months for:  [] Planned Procedure  [] Procedure Follow-up   [] Office Visit      1 [facility], [facility], 9/5/2019 at 2:37 PM

## 2020-08-04 RX ORDER — HYDROCODONE BITARTRATE AND ACETAMINOPHEN 7.5; 325 MG/1; MG/1
1 TABLET ORAL 2 TIMES DAILY PRN
Qty: 60 TABLET | Refills: 0 | Status: SHIPPED | OUTPATIENT
Start: 2020-08-14 | End: 2020-09-08 | Stop reason: SDUPTHER

## 2020-08-13 ENCOUNTER — HOSPITAL ENCOUNTER (OUTPATIENT)
Dept: PAIN MANAGEMENT | Age: 85
Discharge: HOME | End: 2020-08-13
Payer: MEDICARE

## 2020-08-13 VITALS
DIASTOLIC BLOOD PRESSURE: 61 MMHG | TEMPERATURE: 96.6 F | SYSTOLIC BLOOD PRESSURE: 126 MMHG | OXYGEN SATURATION: 96 % | RESPIRATION RATE: 20 BRPM | HEART RATE: 89 BPM

## 2020-08-13 PROCEDURE — 20553 NJX 1/MLT TRIGGER POINTS 3/>: CPT | Performed by: NURSE PRACTITIONER

## 2020-08-13 PROCEDURE — 20553 NJX 1/MLT TRIGGER POINTS 3/>: CPT

## 2020-08-13 PROCEDURE — 2500000003 HC RX 250 WO HCPCS

## 2020-08-13 RX ORDER — LIDOCAINE HYDROCHLORIDE 10 MG/ML
10 INJECTION, SOLUTION EPIDURAL; INFILTRATION; INTRACAUDAL; PERINEURAL ONCE
Status: DISCONTINUED | OUTPATIENT
Start: 2020-08-13 | End: 2020-08-15 | Stop reason: HOSPADM

## 2020-08-13 RX ORDER — BUPIVACAINE HYDROCHLORIDE 5 MG/ML
10 INJECTION, SOLUTION EPIDURAL; INTRACAUDAL ONCE
Status: DISCONTINUED | OUTPATIENT
Start: 2020-08-13 | End: 2020-08-15 | Stop reason: HOSPADM

## 2020-08-14 NOTE — PROCEDURES
[facility] Physical and Pain Medicine      Patient Name: Alysia Small    : 1935                    Age: 82 y.o. Sex: male    Date: 2020    Pre-op Diagnosis: Myofascial Pain/ Muscle Spasms    Post-op Diagnosis: Myofascial Pain/ Muscle Spasms    Procedure: Lumbar Trigger Point Injections    Performing Procedure: Adrienne Carrera, MSN, APRN, FNP-C    Previously Had Procedure:  [] Yes    [x] No    Patient Vitals for the past 24 hrs:   BP Temp Temp src Pulse Resp SpO2   20 1318 126/61 96.6 °F (35.9 °C) Temporal 89 20 96 %       Description of Procedure:    After a brief physical assessment and failure to improve with conservative measures the patient presented for Lumbar Trigger Point Injections. The indications, limitations and possible complications were discussed with the patient and the patient elected to proceed with the procedure. After voluntary, informed and signed consent obtained the patient was placed in a   [x] Sitting  []  Prone position     Appropriate time out was obtained per policy. The area of maximal tenderness was palpated over the  [] Right   [] Left   [x] Bilateral Lower  [x] Latissimus  [x] Erector Spinae   [x] Lumbar Paraspinous. The skin overlying these areas was marked with a skin marker. The skin overlying the proposed injection sites were then sprayed with Gebauer's Solution and prepped in a sterile fashion with Prevantics swab. Each trigger point of the  [] Right   [] Left   [x] Bilateral Lower  [x] Latissimus  [x] Erector Spinae   [x] Lumbar Paraspinous  was then injected after negative aspiration using a 25 gauge 1 1/2 in needle with approximately 2 ml of a solution of     [x] 5 ml of 1% Lidocaine Plain and 5 ml of 0.5% Marcaine Plain per 10 ml syringe  [] Toradol 0.5 ml (30 mg/ml) per 10 ml syringe    Sterile dressings applied. Discharge: The patient tolerated the procedure well.  There were no complications during the procedure and the patient was discharged home with discharge instructions. The patient has been instructed to contact the office should there be any complications or questions to arise between today and their next appointment.     Plan:  [x] Will return to the office in  [] 1 month  [x]  4 - 6 weeks  [] 2 months   [] 3 months for:  [] Planned Procedure [x] Procedure Follow-up  [] Office Visit      1 [facility], [facility], 8/13/2020 at 9:28 PM

## 2020-09-09 RX ORDER — HYDROCODONE BITARTRATE AND ACETAMINOPHEN 7.5; 325 MG/1; MG/1
1 TABLET ORAL 2 TIMES DAILY PRN
Qty: 60 TABLET | Refills: 0 | Status: SHIPPED | OUTPATIENT
Start: 2020-09-12 | End: 2020-10-07 | Stop reason: SDUPTHER

## 2020-10-07 RX ORDER — EMOLLIENT BASE
CREAM (GRAM) TOPICAL
Qty: 300 G | Refills: 5 | Status: SHIPPED | OUTPATIENT
Start: 2020-10-07 | End: 2021-04-08 | Stop reason: SDUPTHER

## 2020-10-07 RX ORDER — HYDROCODONE BITARTRATE AND ACETAMINOPHEN 7.5; 325 MG/1; MG/1
1 TABLET ORAL 2 TIMES DAILY PRN
Qty: 60 TABLET | Refills: 0 | Status: SHIPPED | OUTPATIENT
Start: 2020-10-13 | End: 2020-11-16 | Stop reason: SDUPTHER

## 2020-11-16 NOTE — TELEPHONE ENCOUNTER
Patient called on 11/13/20. The office is closed on Fridays. Patient has an appointment on 12/16/20 with Mary.

## 2020-11-17 RX ORDER — GABAPENTIN 100 MG/1
100 CAPSULE ORAL 2 TIMES DAILY
Qty: 180 CAPSULE | Refills: 0 | Status: SHIPPED | OUTPATIENT
Start: 2020-11-17 | End: 2021-01-14 | Stop reason: SDUPTHER

## 2020-11-17 RX ORDER — HYDROCODONE BITARTRATE AND ACETAMINOPHEN 7.5; 325 MG/1; MG/1
1 TABLET ORAL 2 TIMES DAILY PRN
Qty: 60 TABLET | Refills: 0 | Status: SHIPPED | OUTPATIENT
Start: 2020-11-17 | End: 2020-12-09 | Stop reason: SDUPTHER

## 2020-12-09 RX ORDER — HYDROCODONE BITARTRATE AND ACETAMINOPHEN 7.5; 325 MG/1; MG/1
1 TABLET ORAL 2 TIMES DAILY PRN
Qty: 60 TABLET | Refills: 0 | Status: SHIPPED | OUTPATIENT
Start: 2020-12-17 | End: 2021-01-13 | Stop reason: SDUPTHER

## 2020-12-16 ENCOUNTER — HOSPITAL ENCOUNTER (OUTPATIENT)
Dept: PAIN MANAGEMENT | Age: 85
Discharge: HOME | End: 2020-12-16
Payer: MEDICARE

## 2020-12-16 VITALS
HEIGHT: 72 IN | HEART RATE: 99 BPM | DIASTOLIC BLOOD PRESSURE: 60 MMHG | BODY MASS INDEX: 27.09 KG/M2 | TEMPERATURE: 98 F | WEIGHT: 200 LBS | OXYGEN SATURATION: 98 % | SYSTOLIC BLOOD PRESSURE: 100 MMHG

## 2020-12-16 PROCEDURE — 99213 OFFICE O/P EST LOW 20 MIN: CPT | Performed by: NURSE PRACTITIONER

## 2020-12-16 PROCEDURE — 99213 OFFICE O/P EST LOW 20 MIN: CPT

## 2020-12-16 ASSESSMENT — PAIN DESCRIPTION - PAIN TYPE: TYPE: CHRONIC PAIN

## 2020-12-16 NOTE — PROGRESS NOTES
Clinic Documentation      Education Provided:  [x] Review of Leodis Mealy  [] Agreement Review  [x] PEG Score Calculated [] PHQ Score Calculated [] ORT Score Calculated    [] Compliance Issues Discussed [] Cognitive Behavior Needs [x] Exercise [] Review of Test [] Financial Issues  [x] Tobacco/Alcohol Use Reviewed [x] Teaching [] New Patient [] Picture Obtained    Physician Plan:  [] Outgoing Referral  [] Pharmacy Consult  [] Test Ordered [x] Prescription Ordered/Changed   [] Obtained Test Results / Consult Notes        Complete if patient is withholding blood thinner for procedure     Blood Thinner Patient is currently taking:      [] Plavix (Hold for 7 days)  [] Aspirin (Hold for 5 days)     [] Pletal (Hold for 2 days)  [] Pradaxa (Hold for 3 days)    [] Effient (Hold for 7 days)  [] Xarelto (Hold for 2 days)    [] Eliquis (Hold for 2 days)  [] Brilinta (Hold for 7 days)    [] Coumadin (Hold for 5 days) - (INR needs to be drawn the day prior to procedure- INR < 2.0)    [] Aggrenox (Hold for 7 days)        [] Patient will stop medication on their own.    [] Blood Thinner Form Faxed for approval to hold.    Provider form faxed to:     Assessment Completed by:  Electronically signed by Angela Lackey on 12/16/2020 at 9:41 AM

## 2020-12-16 NOTE — PROGRESS NOTES
[facility] Physical and Pain Medicine    Office Progress Note    Patient Name: Huong Ruth    MR #: [id number]    Account #: [de-identified]    : 1935    Age: 82 y.o. Sex: male    Date: 2020    PCP: REJI Herrera NP         Referring Provider:    Chief Complaint:   Chief Complaint   Patient presents with    Neck Pain    Shoulder Pain       History of Present Illness:    Huong Ruth is a 82 y.o. male who presents to the office for follow-up of bilateral cervical trigger point injections, bilateral lumbar trigger point injections and left shoulder injection. He says that he obtained 80% relief or 6 weeks. He is wanting them repeated. He continues to take his Norco and does get some relief. Last pain management HPI note read    Employment: Retired []   Disabled  []   Works []    Does Not Work [x]     Previous Injury:  Yes  []   No [x]     Previous Surgery: Yes []   No [x]     Previous Physical Therapy In the last 6 months? Yes  []     No [x]   Did Physical Therapy make thepain better or worse? Better []   Worse []  Unchanged []    MRI in the last two years? Yes [x]  No []   Results reviewed with patient? Yes []   No []    CT Scan in the last two years? Yes  []   No [x]   Results reviewed with patient? Yes []   No []    X-ray in the last two years? Yes [x]   No  []  Results reviewed with patient? Yes  []  No []    Injections in the past?  Yes [x]   No []   Did the injections help relieve the pain? Yes [x]   No []     Do you have Depression? Yes  []    No [x]   Thinking of harming yourself or others? Yes  []   No [x]     Past Medical Histoy  Past Medical History:   Diagnosis Date    Arthritis     Shoulder impingement     left       Surgery History  Past Surgical History:   Procedure Laterality Date    FINGER SURGERY Right         Allergies  Patient has no known allergies.      Current Medications  Current Outpatient Medications Medication Sig Dispense Refill    [START ON 12/17/2020] HYDROcodone-acetaminophen (NORCO) 7.5-325 MG per tablet Take 1 tablet by mouth 2 times daily as needed for Pain for up to 30 days. (may fill 12/17/20) 60 tablet 0    gabapentin (NEURONTIN) 100 MG capsule Take 1 capsule by mouth 2 times daily for 90 days. 180 capsule 0    Cream Base CREA [facility] Pain Cream #4 Add Gabapentin 6% Apply 1-2 pumps to affected area 3-4 times a day 300 g 5    calcipotriene (DOVONEX) 0.005 % cream   5    diclofenac (VOLTAREN) 50 MG EC tablet Take 1 tablet by mouth 2 times daily       No current facility-administered medications for this encounter. Social History    Social History     Tobacco Use    Smoking status: Former Smoker    Smokeless tobacco: Current User     Types: Chew   Substance Use Topics    Alcohol use: No         Family History  family history is not on file. Review of Systems:  Constitutional: denies fever, chills, fatigue, change in appetite, weight gain or weight loss  Head: Normocephalic  Skin: denies easy bruising, skin redness, skin rash, hives, sensitivity to sun exposure, tightness, nodules or bumps, hair loss, color changes in the hands or feet with cold. Eyes: denies pain, redness, loss of vision, double or blurred vision, eye drainage, or dryness. ENT and Mouth: denies ringing in the ears, loss of hearing, nasal congestion, nasal discharge, no hoarseness, sore throat, or difficulty swallowing   Respiratory: denies chronic dry cough, coughing up blood, coughing up mucus, waking at night coughing or choking, or wheezing. Cardiovascular: denies chest pain, irregular heartbeats, palpitations, shortness of breath, or edema in legs  Gastrointestinal: denies, nausea, vomiting, heartburn, diarrhea, or constipation  Genitourinary: denies difficult urination, pain or burning with urination, blood in the urine, or cloudy urine  Musculoskeletal: denies arm, buttock, thigh or calf cramps.  Has pain bruising  Musculoskeletal: No joint swelling or deformity   Back Exam: Tenderness with palpation of muscles in lumbar area  Hip Exam: Full rotation bilateral   Knee Exam: Full flexion and extension bilateral, no crepitus  Shoulder Exam: Pain with rotation of left shoulders  Neurologic Exam: Gait and coordination normal, speech normal  Reflexes: Normal brachialis, Negative Garcia's bilateral. Normal Patellar bilateral,   CN EXAM: II-XII intact, face symmetrical, tongue symmetrical, the trapezius and sternocleidomastoid muscle appearance and strength symmetrical, normal achilles bilateral, ankle clonus negative bilateral  Strength: 5/5 RUE Bi's/Tri's, 5/5 LUE Bi's/Tri's, 5/5 RLE knee flex/ext, 5/5 RLE DF/PF, 5/5 LLE knee flex/ext, 5/5 LLE DF/PF  Sensation: Equal and intact to fine touch in all extremities  Mood and affect: Normal   Nurses note reviewed along with current vital signs    Active Problem(s)  Active Problems:    Chronic left shoulder pain    Myofascial muscle pain    Cervicalgia    Muscle spasms of neck    Muscle spasm of back  Resolved Problems:    * No resolved hospital problems. *                                                                                                                            PLAN:  1. Patient is to call the office with any questions or concerns that may arise prior to next appointment. 2. Continue Norco  3. Schedule patient for bilateral cervical trigger point injections and left shoulder injection  4. 1 week after the above schedule patient for bilateral lumbar trigger point injections    Urine Drug Screen Current/Today:  Yes  []  No [x]     Discussion:  Discussed exam findings and plan of care with patient. Patient agreed with the current plan of care at this time. All questions from the patient were answered by the provider. Activity:   Discussed exercise as beneficial to pain reduction, encouraged stretching exercise with a focus on torso strengthening.     Education Provided:  Review of Kaylynn Rear [x] Agreement Review [x]  Reviewed PHQ-9  [x]    Review of Test [] Compliance Issues Discussed []   Cognitive Behavior Needs [] Exercise [x]  Financial Issues []   Tobacco/Alcohol Use [] Teaching  [x]     Goal:  Pain Management Goals of Therapy:   []        Resolution in pain  [x]        Decrease in pain level  [x]        Improvement in ADL's  [x]        Increase in activities with less pain  [x]        Decrease in medication      [] Benzodiazapine's and Narcotics:  Patient educated on the possible effects of combining Benzodiazapine's and Opioids. Explained \"Black Box Warnings\" such as; possible suppressed breathing, hypoxia, anoxia, depressed cognition, heart arrhythmia, coma and possible death. Patient verbalized understanding concerning possible effects. Controlled Substance Monitoring:  Attestation: The KAM report for this patient was reviewed today. Discussed with patient possible medication side effects, risk of tolerance, dependence and alternative treatments. Discussed the growing epidemic in the [place] with the overprescribing and at times the abuse of narcotics. Discussed the detrimental effects of long term narcotic use. Patient encouraged to set daily goals of exercising and decreasing daily narcotic intake. Discussed with the patient about the development of hyperalgesia with long term narcotic intake. EMR dragon/transcription disclaimer: Much of this encounter note is electronic transcription/translation of spoken language to printed tach. Electronic translation of spoken language may be erroneous, or at times, nonsensical words or phrases may be inadvertently transcribed.  Although, I have reviewed the note for such errors, some may still exist.     CC:  REJI Villalobos - REJI Early, 12/16/2020 at 10:32 AM

## 2021-01-13 DIAGNOSIS — M54.12 CERVICAL RADICULAR PAIN: ICD-10-CM

## 2021-01-14 ENCOUNTER — HOSPITAL ENCOUNTER (OUTPATIENT)
Dept: PAIN MANAGEMENT | Age: 86
Discharge: HOME | End: 2021-01-14
Payer: MEDICARE

## 2021-01-14 VITALS
OXYGEN SATURATION: 94 % | SYSTOLIC BLOOD PRESSURE: 121 MMHG | TEMPERATURE: 97.1 F | RESPIRATION RATE: 18 BRPM | HEART RATE: 90 BPM | DIASTOLIC BLOOD PRESSURE: 60 MMHG

## 2021-01-14 DIAGNOSIS — M54.12 CERVICAL RADICULAR PAIN: ICD-10-CM

## 2021-01-14 PROCEDURE — 2500000003 HC RX 250 WO HCPCS

## 2021-01-14 PROCEDURE — 20553 NJX 1/MLT TRIGGER POINTS 3/>: CPT | Performed by: NURSE PRACTITIONER

## 2021-01-14 PROCEDURE — 6360000002 HC RX W HCPCS

## 2021-01-14 PROCEDURE — 20610 DRAIN/INJ JOINT/BURSA W/O US: CPT | Performed by: NURSE PRACTITIONER

## 2021-01-14 PROCEDURE — 20610 DRAIN/INJ JOINT/BURSA W/O US: CPT

## 2021-01-14 PROCEDURE — 20553 NJX 1/MLT TRIGGER POINTS 3/>: CPT

## 2021-01-14 RX ORDER — HYDROCODONE BITARTRATE AND ACETAMINOPHEN 7.5; 325 MG/1; MG/1
1 TABLET ORAL 2 TIMES DAILY PRN
Qty: 60 TABLET | Refills: 0 | Status: SHIPPED | OUTPATIENT
Start: 2021-01-16 | End: 2021-02-17 | Stop reason: SDUPTHER

## 2021-01-14 RX ORDER — BUPIVACAINE HYDROCHLORIDE 5 MG/ML
1 INJECTION, SOLUTION EPIDURAL; INTRACAUDAL ONCE
Status: DISCONTINUED | OUTPATIENT
Start: 2021-01-14 | End: 2021-01-16 | Stop reason: HOSPADM

## 2021-01-14 RX ORDER — LIDOCAINE HYDROCHLORIDE 10 MG/ML
1 INJECTION, SOLUTION EPIDURAL; INFILTRATION; INTRACAUDAL; PERINEURAL ONCE
Status: DISCONTINUED | OUTPATIENT
Start: 2021-01-14 | End: 2021-01-16 | Stop reason: HOSPADM

## 2021-01-14 RX ORDER — BUPIVACAINE HYDROCHLORIDE 5 MG/ML
10 INJECTION, SOLUTION EPIDURAL; INTRACAUDAL ONCE
Status: DISCONTINUED | OUTPATIENT
Start: 2021-01-14 | End: 2021-01-16 | Stop reason: HOSPADM

## 2021-01-14 RX ORDER — LIDOCAINE HYDROCHLORIDE 10 MG/ML
10 INJECTION, SOLUTION EPIDURAL; INFILTRATION; INTRACAUDAL; PERINEURAL ONCE
Status: DISCONTINUED | OUTPATIENT
Start: 2021-01-14 | End: 2021-01-16 | Stop reason: HOSPADM

## 2021-01-14 RX ORDER — GABAPENTIN 100 MG/1
100 CAPSULE ORAL 2 TIMES DAILY
Qty: 180 CAPSULE | Refills: 0 | Status: SHIPPED | OUTPATIENT
Start: 2021-02-15 | End: 2021-05-13 | Stop reason: SDUPTHER

## 2021-01-14 RX ORDER — TRIAMCINOLONE ACETONIDE 40 MG/ML
40 INJECTION, SUSPENSION INTRA-ARTICULAR; INTRAMUSCULAR ONCE
Status: DISCONTINUED | OUTPATIENT
Start: 2021-01-14 | End: 2021-01-16 | Stop reason: HOSPADM

## 2021-01-14 NOTE — TELEPHONE ENCOUNTER
Patient requested refill on gabapentin. He received it 11/17/20 as a 3 month supply. He will not be due to fill until 2/15/21.

## 2021-01-14 NOTE — PROCEDURES
[facility] Physical and Pain Medicine      Patient Name: Yaakov Fernandez    : 1935                    Age: 82 y.o. Sex: male    Date: 2021    Pre-op Diagnosis: Myofascial Pain/ Muscle Spasms/ Cervicalgia    Post-op Diagnosis: Myofascial Pain/ Muscle Spasms/ Cervicalgia    Procedure: Cervical Trigger Point Injections    Performing Procedure: Dorian Sullivan, MSN, APRN, FNP-C    Previously Had Procedure:  Yes []  No [x]     Patient Vitals for the past 24 hrs:   BP Temp Temp src Pulse Resp SpO2   21 1104 121/60 97.1 °F (36.2 °C) Temporal 90 18 94 %       Description of Procedure:     After a brief physical assessment and failure to improve with conservative measures the patient presented for Cervical Trigger Point Injections The indications, limitations and possible complications were discussed with the patient and the patient elected to proceed with the procedure. After voluntary, informed and signed consent obtained the patient was placed in a seated position. Appropriate time out was obtained per policy. The area of maximal tenderness was palpated over the [] Right   []  Left   [x]  Bilateral Cervical   [x] Splenius   [x] Trapezius  [x] Rhomboid. The skin overlying these areas was marked with a skin marker. The skin overlying the proposed injection sites were then sprayed with Gebauer's Solution while protecting patient eyes. The areas were then prepped in a sterile fashion with Prevantics swab. Each trigger point of the  [] Right   []   Left  [x]   Bilateral Cervical  [x] Splenius  [x] Trapezius   [x] Rhomboid   was then injected after negative aspiration using a 25 gauge 1 1/2 in needle with approximately 2 ml of a solution of     [x] 5 ml of 1% Lidocaine Plain and 5 ml of 0.5% Marcaine Plain per 10 ml syringe  [] Toradol 0.5 ml (30 mg/ml) per 10 ml syringe    Sterile dressings applied.              [facility] Physical and Pain Medicine        Patient Name: Abigail Barry    : 1935    Age: 82 y.o. Sex: male    Date: 2021    Preop Diagnosis: Osteoarthritis of  [] Right  [x]  Left  []  Bilateral Shoulder(s)    Postop Diagnosis: Osteoarthritis of [] Right  [x]  Left  []  Bilateral Shoulder(s)    Procedure: Steroid Injection of  [] Right  [x]  Left   []  Bilateral Shoulder(s)    Performing Procedure:  Lu Maria, MSN, APRN, FNP-C    Previously Had Procedure:   [] Yes   [x]  No    Patient Vitals for the past 24 hrs:   BP Temp Temp src Pulse Resp SpO2   21 1104 121/60 97.1 °F (36.2 °C) Temporal 90 18 94 %       Description of Procedure:    After a brief physical assessment and failure to improve with conservative measures the patient presented for an injection of the [] Right  [x] Left   [] Bilateral Shoulder(s). The indications, limitations and possible complications were discussed with the patient and the patient elected to proceed with the procedure. [] Right Shoulder    After voluntary, informed and signed consent obtained the patient was placed in a sitting position with the patients right arm placed to the side and elbow flexed at 90 degrees. Appropriate time out was obtained per policy. The proposed injection site was palpated at the point of maximal tenderness [] Anterior  [] Posterior [] Lateral and the skin over the proposed injection entry point was marked. The skin was then sprayed with Gebauer's Solution while protecting patients eyes and prepped in a sterile fashion with Prevantics swab. Under sterile technique a 22 gauge 1 1/2 inch needle was introduced and after a negative aspiration a solution of 1 ml of 0.5% Marcaine Plain, 1 ml of 1% Lidocaine Plain and       [] 1 ml of Kenalog (40mg/ml)   [] Toradol 30 mg/ml was injected. The needle was withdrawn and a sterile dressing applied.     [x] Left Shoulder     After voluntary, informed and signed consent obtained the patient was placed in a sitting position with the patients left arm placed to the side and elbow flexed at 90 degrees. Appropriate time out was obtained per policy. The proposed injection site was palpated at the point of maximal tenderness  [] Anterior  [x] Posterior [] Lateral and the skin over the proposed injection entry point was marked. The skin was then sprayed with Gebauer's Solution while protecting patients eyes and prepped in a sterile fashion with Prevantics swab. Under sterile technique a 22 gauge 1 1/2 inch needle was introduced and after a negative aspiration a solution of 1 ml of 0.5% Marcaine Plain, 1 ml of 1% Lidocaine Plain and     [x] 1 ml of Kenalog (40mg/ml)   [] Toradol 30 mg/ml was injected. The needle was withdrawn and a sterile dressing applied. Discharge: The patient tolerated the procedure well. There were no complications during the procedure and the patient was discharged home with discharge instructions. The patient has been instructed to contact the office should there be any complications or questions to arise between today and their next appointment.     Plan:  [x] Will return to the office in   [] 1 month  [x] 4 - 6 weeks   [] 2 months   []  3 months for:  [] Planned Procedure  [x] Procedure Follow-up   [] Office Visit      1 [facility], [facility], 9/5/2019 at 2:37 PM

## 2021-01-21 ENCOUNTER — HOSPITAL ENCOUNTER (OUTPATIENT)
Dept: PAIN MANAGEMENT | Age: 86
Discharge: HOME | End: 2021-01-21
Payer: MEDICARE

## 2021-01-21 VITALS
RESPIRATION RATE: 18 BRPM | OXYGEN SATURATION: 95 % | HEART RATE: 88 BPM | DIASTOLIC BLOOD PRESSURE: 64 MMHG | SYSTOLIC BLOOD PRESSURE: 118 MMHG | TEMPERATURE: 97.1 F

## 2021-01-21 PROCEDURE — 20553 NJX 1/MLT TRIGGER POINTS 3/>: CPT | Performed by: NURSE PRACTITIONER

## 2021-01-21 PROCEDURE — 2500000003 HC RX 250 WO HCPCS

## 2021-01-21 PROCEDURE — 20553 NJX 1/MLT TRIGGER POINTS 3/>: CPT

## 2021-01-21 RX ORDER — BUPIVACAINE HYDROCHLORIDE 5 MG/ML
10 INJECTION, SOLUTION EPIDURAL; INTRACAUDAL ONCE
Status: DISCONTINUED | OUTPATIENT
Start: 2021-01-21 | End: 2021-01-23 | Stop reason: HOSPADM

## 2021-01-21 RX ORDER — LIDOCAINE HYDROCHLORIDE 10 MG/ML
10 INJECTION, SOLUTION EPIDURAL; INFILTRATION; INTRACAUDAL; PERINEURAL ONCE
Status: DISCONTINUED | OUTPATIENT
Start: 2021-01-21 | End: 2021-01-23 | Stop reason: HOSPADM

## 2021-01-21 NOTE — PROCEDURES
[facility] Physical and Pain Medicine      Patient Name: River Small    : 1935                    Age: 82 y.o. Sex: male    Date: 2021    Pre-op Diagnosis: Myofascial Pain/ Muscle Spasms    Post-op Diagnosis: Myofascial Pain/ Muscle Spasms    Procedure: Lumbar Trigger Point Injections    Performing Procedure: Waldo Law, MSN, APRN, FNP-C    Previously Had Procedure:  [] Yes    [x] No    Patient Vitals for the past 24 hrs:   BP Temp Temp src Pulse Resp SpO2   21 1044 118/64 97.1 °F (36.2 °C) Temporal 88 18 95 %       Description of Procedure:    After a brief physical assessment and failure to improve with conservative measures the patient presented for Lumbar Trigger Point Injections. The indications, limitations and possible complications were discussed with the patient and the patient elected to proceed with the procedure. After voluntary, informed and signed consent obtained the patient was placed in a   [x] Sitting  []  Prone position     Appropriate time out was obtained per policy. The area of maximal tenderness was palpated over the  [] Right   [] Left   [x] Bilateral Lower  [x] Latissimus  [x] Erector Spinae   [x] Lumbar Paraspinous. The skin overlying these areas was marked with a skin marker. The skin overlying the proposed injection sites were then sprayed with Gebauer's Solution and prepped in a sterile fashion with Prevantics swab. Each trigger point of the  [] Right   [] Left   [x] Bilateral Lower  [x] Latissimus  [x] Erector Spinae   [x] Lumbar Paraspinous  was then injected after negative aspiration using a 25 gauge 1 1/2 in needle with approximately 2 ml of a solution of     [x] 5 ml of 1% Lidocaine Plain and 5 ml of 0.5% Marcaine Plain per 10 ml syringe  [] Toradol 0.5 ml (30 mg/ml) per 10 ml syringe    Sterile dressings applied. Discharge: The patient tolerated the procedure well.  There were no complications during the procedure and the patient was discharged home with discharge instructions. The patient has been instructed to contact the office should there be any complications or questions to arise between today and their next appointment.     Plan:  [x] Will return to the office in  [] 1 month  [x]  4 - 6 weeks  [] 2 months   [] 3 months for:  [] Planned Procedure [x] Procedure Follow-up  [] Office Visit      1 [facility], 1/21/2021 at 12:01 PM

## 2021-01-27 ENCOUNTER — TELEPHONE (OUTPATIENT)
Dept: PAIN MANAGEMENT | Age: 86
End: 2021-01-27

## 2021-01-27 NOTE — TELEPHONE ENCOUNTER
I CALLED THE PATIENT AS A FOLLOW UP FROM HIS BILATERAL LUMBAR TRIGGER POINT INJECTIONS THAT HE HAD ON 1/21/2021.   HE DID NOT ANSWER SO

## 2021-02-17 DIAGNOSIS — M54.12 CERVICAL RADICULAR PAIN: ICD-10-CM

## 2021-02-17 RX ORDER — HYDROCODONE BITARTRATE AND ACETAMINOPHEN 7.5; 325 MG/1; MG/1
1 TABLET ORAL 2 TIMES DAILY PRN
Qty: 60 TABLET | Refills: 0 | Status: SHIPPED | OUTPATIENT
Start: 2021-02-17 | End: 2021-03-15 | Stop reason: SDUPTHER

## 2021-03-15 DIAGNOSIS — M54.12 CERVICAL RADICULAR PAIN: ICD-10-CM

## 2021-03-15 RX ORDER — HYDROCODONE BITARTRATE AND ACETAMINOPHEN 7.5; 325 MG/1; MG/1
1 TABLET ORAL 2 TIMES DAILY PRN
Qty: 60 TABLET | Refills: 0 | Status: SHIPPED | OUTPATIENT
Start: 2021-03-19 | End: 2021-04-08 | Stop reason: SDUPTHER

## 2021-04-08 DIAGNOSIS — M54.12 CERVICAL RADICULAR PAIN: ICD-10-CM

## 2021-04-08 DIAGNOSIS — M25.512 CHRONIC LEFT SHOULDER PAIN: ICD-10-CM

## 2021-04-08 DIAGNOSIS — G89.29 CHRONIC LEFT SHOULDER PAIN: ICD-10-CM

## 2021-04-12 RX ORDER — HYDROCODONE BITARTRATE AND ACETAMINOPHEN 7.5; 325 MG/1; MG/1
1 TABLET ORAL 2 TIMES DAILY PRN
Qty: 60 TABLET | Refills: 0 | Status: SHIPPED | OUTPATIENT
Start: 2021-04-17 | End: 2021-04-21 | Stop reason: SDUPTHER

## 2021-04-12 RX ORDER — EMOLLIENT BASE
CREAM (GRAM) TOPICAL
Qty: 300 G | Refills: 5 | Status: SHIPPED | OUTPATIENT
Start: 2021-04-12 | End: 2021-10-25 | Stop reason: SDUPTHER

## 2021-04-21 ENCOUNTER — HOSPITAL ENCOUNTER (OUTPATIENT)
Dept: PAIN MANAGEMENT | Age: 86
Discharge: HOME | End: 2021-04-21
Payer: MEDICARE

## 2021-04-21 VITALS
TEMPERATURE: 98 F | HEIGHT: 72 IN | WEIGHT: 200 LBS | DIASTOLIC BLOOD PRESSURE: 75 MMHG | HEART RATE: 87 BPM | BODY MASS INDEX: 27.09 KG/M2 | SYSTOLIC BLOOD PRESSURE: 136 MMHG | OXYGEN SATURATION: 95 %

## 2021-04-21 DIAGNOSIS — M54.12 CERVICAL RADICULAR PAIN: ICD-10-CM

## 2021-04-21 PROCEDURE — 99213 OFFICE O/P EST LOW 20 MIN: CPT | Performed by: NURSE PRACTITIONER

## 2021-04-21 PROCEDURE — 99213 OFFICE O/P EST LOW 20 MIN: CPT

## 2021-04-21 RX ORDER — HYDROCODONE BITARTRATE AND ACETAMINOPHEN 7.5; 325 MG/1; MG/1
1 TABLET ORAL 2 TIMES DAILY PRN
Qty: 60 TABLET | Refills: 0 | Status: SHIPPED | OUTPATIENT
Start: 2021-05-18 | End: 2021-06-14 | Stop reason: SDUPTHER

## 2021-04-21 NOTE — LETTER
[facility]  [address]  [address]  Phone: [phone]  Fax: [address] REJI Avendano        April 21, 2021    [address]      To whom it may concern:  Lizz Berger was here in the office with her dad for his appointment today. If you have any questions or concerns, please don't hesitate to call.     Sincerely,        REJI Coy/ Marin Marrow CMA

## 2021-04-21 NOTE — PROGRESS NOTES
[facility] Physical and Pain Medicine    Office Progress Note    Patient Name: Alin Zimmerman    MR #: [id number]    Account #: [de-identified]    : 1935    Age: 82 y.o. Sex: male    Date: 2021    PCP: RJEI Mcgregor NP         Referring Provider:    Chief Complaint:   Chief Complaint   Patient presents with    Back Pain    Neck Pain       History of Present Illness:    Alin Zimmerman is a 82 y.o. male who presents to the office for follow-up of bilateral cervical trigger point injections, bilateral lumbar trigger point injections and left shoulder injection. He says that he obtained 80% relief or 6 weeks. He is wanting them repeated. He continues to take his Norco and does get some relief that allows him to complete ADL's. Last pain management HPI note read    Employment: Retired []   Disabled  []   Works []    Does Not Work [x]     Previous Injury:  Yes  []   No [x]     Previous Surgery: Yes []   No [x]     Previous Physical Therapy In the last 6 months? Yes  []     No [x]   Did Physical Therapy make thepain better or worse? Better []   Worse []  Unchanged []    MRI in the last two years? Yes [x]  No []   Results reviewed with patient? Yes []   No []    CT Scan in the last two years? Yes  []   No [x]   Results reviewed with patient? Yes []   No []    X-ray in the last two years? Yes [x]   No  []  Results reviewed with patient? Yes  []  No []    Injections in the past?  Yes [x]   No []   Did the injections help relieve the pain? Yes [x]   No []     Do you have Depression? Yes  []    No [x]   Thinking of harming yourself or others? Yes  []   No [x]     Past Medical Histoy  Past Medical History:   Diagnosis Date    Arthritis     Shoulder impingement     left       Surgery History  Past Surgical History:   Procedure Laterality Date    FINGER SURGERY Right         Allergies  Patient has no known allergies.      Current Medications  Current Outpatient Medications   Medication Sig Dispense Refill    Cream Base CREA [facility] Pain Cream #4 Add Gabapentin 6% Apply 1-2 pumps to affected area 3-4 times a day 300 g 5    gabapentin (NEURONTIN) 100 MG capsule Take 1 capsule by mouth 2 times daily for 90 days. 180 capsule 0    calcipotriene (DOVONEX) 0.005 % cream   5    diclofenac (VOLTAREN) 50 MG EC tablet Take 1 tablet by mouth 2 times daily      [START ON 5/18/2021] HYDROcodone-acetaminophen (NORCO) 7.5-325 MG per tablet Take 1 tablet by mouth 2 times daily as needed for Pain for up to 30 days. May fill 5/18/2021 60 tablet 0     No current facility-administered medications for this encounter. Social History    Social History     Tobacco Use    Smoking status: Former Smoker    Smokeless tobacco: Current User     Types: Chew   Substance Use Topics    Alcohol use: No         Family History  family history is not on file. Review of Systems:  Constitutional: denies fever, chills, fatigue, change in appetite, weight gain or weight loss  Head: Normocephalic  Skin: denies easy bruising, skin redness, skin rash, hives, sensitivity to sun exposure, tightness, nodules or bumps, hair loss, color changes in the hands or feet with cold. Eyes: denies pain, redness, loss of vision, double or blurred vision, eye drainage, or dryness. ENT and Mouth: denies ringing in the ears, loss of hearing, nasal congestion, nasal discharge, no hoarseness, sore throat, or difficulty swallowing   Respiratory: denies chronic dry cough, coughing up blood, coughing up mucus, waking at night coughing or choking, or wheezing.    Cardiovascular: denies chest pain, irregular heartbeats, palpitations, shortness of breath, or edema in legs  Gastrointestinal: denies, nausea, vomiting, heartburn, diarrhea, or constipation  Genitourinary: denies difficult urination, pain or burning with urination, blood in the urine, or cloudy urine  Musculoskeletal: denies arm, buttock, thigh or calf cramps. Has pain in muscles of neck and bilateral shoulders, left shoulder, low back, muscle spasms in neck and bilateral shoulders and low back and tenderness in neck, bilateral shoulder, left shoulder and low back. No muscle weakness. No joint swelling. Neurologic: headache, dizziness, fainting, loss of consciousness, no memory loss. no sensitivity. Endocrine: denies intolerance to hot or cold temperature, night sweats, flushing, fingernail changes, increased thirst, or hairloss   Hematologic/ Lymphatic: denies anemia, bleeding tendency or clotting tendency, bruising easily. Allergic/ Immunologic: denies rhinitis, asthma, skin sensitivity, or allergy to Latex   Psychiatric: denies depression or thoughts of suicide, or voices in head. Current Pain Assessment:   Pain Assessment  Pain Assessment: 0-10  Pain Level: 9  Patient's Stated Pain Goal: 3  Pain Type: Chronic pain  Pain Location: Back, Neck, Shoulder    Clinical Progression: gradually improving  Effect of Pain on Daily Activities: limits activity  Patient's Stated Pain Goal: No pain  Pain Intervention(s): Medication (see eMar), Repositioning, Rest, Ice    Current PE    ORT Score: 0    PHQ-9 Score: 0    Physical Exam:    Vitals:    21 1305   BP: 136/75   Pulse: 87   Temp: 98 °F (36.7 °C)   TempSrc: Temporal   SpO2: 95%   Weight: 200 lb (90.7 kg)   Height: 6' (1.829 m)       Body mass index is 27.12 kg/m². General Appearance: no acute distress. Appears to be well dressed  Skin Exam: Warm and dry, no jaundice, rashes or lesions  Head Exam: NCAT, PERRLA, EOMI, moist mucus membranes, scalp normal  Neck Exam: Supple, no masses palpated, trachea midline. Tenderness with palpation of neck and bilateral shoulder muscles  Lung: Clear to ausculation in all lobes anterior and posterior. Heart[de-identified] Regular rate and rhythm, no gallops, rubs or murmurs, no edema  Abdomen:  Bowel sounds in all quadrants, soft, non-distended, non-tender with reduction, encouraged stretching exercise with a focus on torso strengthening. Education Provided:  Review of Corina Meiers [x] Agreement Review [x]  Reviewed PHQ-9  [x]    Review of Test [] Compliance Issues Discussed []   Cognitive Behavior Needs [] Exercise [x]  Financial Issues []   Tobacco/Alcohol Use [] Teaching  [x]     Goal:  Pain Management Goals of Therapy:   []        Resolution in pain  [x]        Decrease in pain level  [x]        Improvement in ADL's  [x]        Increase in activities with less pain  [x]        Decrease in medication      [] Benzodiazapine's and Narcotics:  Patient educated on the possible effects of combining Benzodiazapine's and Opioids. Explained \"Black Box Warnings\" such as; possible suppressed breathing, hypoxia, anoxia, depressed cognition, heart arrhythmia, coma and possible death. Patient verbalized understanding concerning possible effects. Controlled Substance Monitoring:  Attestation: The KAM report for this patient was reviewed today. Discussed with patient possible medication side effects, risk of tolerance, dependence and alternative treatments. Discussed the growing epidemic in the [place] with the overprescribing and at times the abuse of narcotics. Discussed the detrimental effects of long term narcotic use. Patient encouraged to set daily goals of exercising and decreasing daily narcotic intake. Discussed with the patient about the development of hyperalgesia with long term narcotic intake. EMR dragon/transcription disclaimer: Much of this encounter note is electronic transcription/translation of spoken language to printed tach. Electronic translation of spoken language may be erroneous, or at times, nonsensical words or phrases may be inadvertently transcribed.  Although, I have reviewed the note for such errors, some may still exist.     CC:  REJI Nicole - REJI Chapman, 4/21/2021 at 1:23 PM

## 2021-05-13 ENCOUNTER — HOSPITAL ENCOUNTER (OUTPATIENT)
Dept: PAIN MANAGEMENT | Age: 86
Discharge: HOME | End: 2021-05-13
Payer: MEDICARE

## 2021-05-13 VITALS
OXYGEN SATURATION: 94 % | DIASTOLIC BLOOD PRESSURE: 56 MMHG | SYSTOLIC BLOOD PRESSURE: 120 MMHG | RESPIRATION RATE: 18 BRPM | HEART RATE: 80 BPM | TEMPERATURE: 96.3 F

## 2021-05-13 DIAGNOSIS — M54.12 CERVICAL RADICULAR PAIN: ICD-10-CM

## 2021-05-13 PROCEDURE — 20610 DRAIN/INJ JOINT/BURSA W/O US: CPT | Performed by: NURSE PRACTITIONER

## 2021-05-13 PROCEDURE — 2500000003 HC RX 250 WO HCPCS

## 2021-05-13 PROCEDURE — 20610 DRAIN/INJ JOINT/BURSA W/O US: CPT

## 2021-05-13 PROCEDURE — 6360000002 HC RX W HCPCS

## 2021-05-13 PROCEDURE — 20553 NJX 1/MLT TRIGGER POINTS 3/>: CPT

## 2021-05-13 PROCEDURE — 20553 NJX 1/MLT TRIGGER POINTS 3/>: CPT | Performed by: NURSE PRACTITIONER

## 2021-05-13 RX ORDER — LIDOCAINE HYDROCHLORIDE 10 MG/ML
10 INJECTION, SOLUTION EPIDURAL; INFILTRATION; INTRACAUDAL; PERINEURAL ONCE
Status: DISCONTINUED | OUTPATIENT
Start: 2021-05-13 | End: 2021-05-15 | Stop reason: HOSPADM

## 2021-05-13 RX ORDER — LIDOCAINE HYDROCHLORIDE 10 MG/ML
1 INJECTION, SOLUTION EPIDURAL; INFILTRATION; INTRACAUDAL; PERINEURAL ONCE
Status: DISCONTINUED | OUTPATIENT
Start: 2021-05-13 | End: 2021-05-15 | Stop reason: HOSPADM

## 2021-05-13 RX ORDER — GABAPENTIN 100 MG/1
100 CAPSULE ORAL 2 TIMES DAILY
Qty: 180 CAPSULE | Refills: 0 | Status: SHIPPED | OUTPATIENT
Start: 2021-05-16 | End: 2021-07-26 | Stop reason: SDUPTHER

## 2021-05-13 RX ORDER — BUPIVACAINE HYDROCHLORIDE 5 MG/ML
1 INJECTION, SOLUTION EPIDURAL; INTRACAUDAL ONCE
Status: DISCONTINUED | OUTPATIENT
Start: 2021-05-13 | End: 2021-05-15 | Stop reason: HOSPADM

## 2021-05-13 RX ORDER — TRIAMCINOLONE ACETONIDE 40 MG/ML
40 INJECTION, SUSPENSION INTRA-ARTICULAR; INTRAMUSCULAR ONCE
Status: DISCONTINUED | OUTPATIENT
Start: 2021-05-13 | End: 2021-05-15 | Stop reason: HOSPADM

## 2021-05-13 RX ORDER — BUPIVACAINE HYDROCHLORIDE 5 MG/ML
10 INJECTION, SOLUTION EPIDURAL; INTRACAUDAL ONCE
Status: DISCONTINUED | OUTPATIENT
Start: 2021-05-13 | End: 2021-05-15 | Stop reason: HOSPADM

## 2021-05-13 NOTE — COMMUNICATION BODY
Assessment: Pt. Is here for a procedure today. Brennan Lopez, daughter is his  today. 5/13/2021.        Plan:

## 2021-05-13 NOTE — PROGRESS NOTES
Procedure:  Level of Consciousness: [x]Alert [x]Oriented []Disoriented []Lethargic  Anxiety Level: [x]Calm []Anxious []Depressed []Other  Skin: [x]Warm [x]Dry []Cool []Moist []Intact []Other  Cardiovascular: [x]Palpitations: [x]Never []Occasionally []Frequently  Chest Pain: [x]No []Yes  Respiratory:  [x]Unlabored []Labored []Cough ([] Productive []Unproductive)  HCG Required: [x]No []Yes   Results: []Negative []Positive  Knowledge Level:        [x]Patient/Other verbalized understanding of pre-procedure instructions. [x]Assessment of post-op care needs (transportation, responsible caregiver)        [x]Able to discuss health care problems and how to deal with it.   Factors that Affect Teaching:        Language Barrier: [x]No []Yes - why:        Hearing Loss:        []No [x]Yes            Corrective Device:  [x]Yes []No        Vision Loss:           []No [x]Yes            Corrective Device:  [x]Yes []No        Memory Loss:       [x]No []Yes            []Short Term []Long Term  Motivational Level:  [x]Asks Questions                  []Extremely Anxious       [x]Seems Interested               []Seems Uninterested                  []Denies need for Education  Risk for Injury:  [x]Patient oriented to person, place and time  []History of frequent falls/loss of balance  Nutritional:  []Change in appetite   []Weight Gain   []Weight Loss  Functional:  []Requires assistance with ADL's

## 2021-05-17 NOTE — PROCEDURES
[facility] Physical and Pain Medicine      Patient Name: Aldair Bob    : 1935                    Age: 82 y.o. Sex: male    Date: May 17, 2021    Pre-op Diagnosis: Myofascial Pain/ Muscle Spasms/ Cervicalgia    Post-op Diagnosis: Myofascial Pain/ Muscle Spasms/ Cervicalgia    Procedure: Cervical Trigger Point Injections    Performing Procedure: Zeeshan Burris, MSN, APRN, FNP-C    Previously Had Procedure:  Yes [x]  No []     No data found. Description of Procedure:     After a brief physical assessment and failure to improve with conservative measures the patient presented for Cervical Trigger Point Injections The indications, limitations and possible complications were discussed with the patient and the patient elected to proceed with the procedure. After voluntary, informed and signed consent obtained the patient was placed in a seated position. Appropriate time out was obtained per policy. The area of maximal tenderness was palpated over the [] Right   []  Left   [x]  Bilateral Cervical   [x] Splenius   [x] Trapezius  [x] Rhomboid. The skin overlying these areas was marked with a skin marker. The skin overlying the proposed injection sites were then sprayed with Gebauer's Solution while protecting patient eyes. The areas were then prepped in a sterile fashion with Prevantics swab. Each trigger point of the  [] Right   []   Left  [x]   Bilateral Cervical  [x] Splenius  [x] Trapezius   [x] Rhomboid   was then injected after negative aspiration using a 25 gauge 1 1/2 in needle with approximately 2 ml of a solution of     [x] 5 ml of 1% Lidocaine Plain and 5 ml of 0.5% Marcaine Plain per 10 ml syringe  [] Toradol 0.5 ml (30 mg/ml) per 10 ml syringe    Sterile dressings applied. [facility] Physical and Pain Medicine        Patient Name: Aldair Bob    : 1935    Age: 82 y.o.     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 Description of Procedure:    After a brief physical assessment and failure to improve with conservative measures the patient presented for an injection of the [] Right  [x] Left   [] Bilateral Shoulder(s). The indications, limitations and possible complications were discussed with the patient and the patient elected to proceed with the procedure. [] Right Shoulder    After voluntary, informed and signed consent obtained the patient was placed in a sitting position with the patients right arm placed to the side and elbow flexed at 90 degrees. Appropriate time out was obtained per policy. The proposed injection site was palpated at the point of maximal tenderness [] Anterior  [] Posterior [] Lateral and the skin over the proposed injection entry point was marked. The skin was then sprayed with Gebauer's Solution while protecting patients eyes and prepped in a sterile fashion with Prevantics swab. Under sterile technique a 22 gauge 1 1/2 inch needle was introduced and after a negative aspiration a solution of 1 ml of 0.5% Marcaine Plain, 1 ml of 1% Lidocaine Plain and       [] 1 ml of Kenalog (40mg/ml)   [] Toradol 30 mg/ml was injected. The needle was withdrawn and a sterile dressing applied. [] Left Shoulder     After voluntary, informed and signed consent obtained the patient was placed in a sitting position with the patients left arm placed to the side and elbow flexed at 90 degrees. Appropriate time out was obtained per policy.     The proposed injection site was palpated at the point of maximal tenderness  [] Anterior  [x] Posterior [] Lateral and the skin over the proposed injection entry point was marked. The skin was then sprayed with Gebauer's Solution while protecting patients eyes and prepped in a sterile fashion with Prevantics swab. Under sterile technique a 22 gauge 1 1/2 inch needle was introduced and after a negative aspiration a solution of 1 ml of 0.5% Marcaine Plain, 1 ml of 1% Lidocaine Plain and     [x] 1 ml of Kenalog (40mg/ml)   [] Toradol 30 mg/ml was injected. The needle was withdrawn and a sterile dressing applied. Discharge: The patient tolerated the procedure well. There were no complications during the procedure and the patient was discharged home with discharge instructions. The patient has been instructed to contact the office should there be any complications or questions to arise between today and their next appointment.     Plan:  [x] Will return to the office in   [] 1 month  [x] 4 - 6 weeks   [] 2 months   []  3 months for:  [] Planned Procedure  [x] Procedure Follow-up   [] Office Visit      1 [facility], [facility], 9/5/2019 at 2:37 PM

## 2021-05-20 ENCOUNTER — TELEPHONE (OUTPATIENT)
Dept: PAIN MANAGEMENT | Age: 86
End: 2021-05-20

## 2021-05-20 NOTE — TELEPHONE ENCOUNTER
I CALLED THE PATIENT ON 5/19/2021 AT 1414 AS A FOLLOW UP FROM THE CERVICAL TRIGGER POINT INJECTIONS AND LEFT SHOULDER INJECTION THAT HE HAD ON 5/13/2021. THE PATIENT DID NOT ANSWER AND THERE WAS NO WAY TO LEAVE A MESSAGE.

## 2021-06-10 ENCOUNTER — HOSPITAL ENCOUNTER (OUTPATIENT)
Dept: PAIN MANAGEMENT | Age: 86
Discharge: HOME | End: 2021-06-10
Payer: MEDICARE

## 2021-06-10 VITALS
DIASTOLIC BLOOD PRESSURE: 61 MMHG | TEMPERATURE: 96.4 F | OXYGEN SATURATION: 96 % | SYSTOLIC BLOOD PRESSURE: 102 MMHG | HEART RATE: 82 BPM | RESPIRATION RATE: 20 BRPM

## 2021-06-10 PROCEDURE — 2500000003 HC RX 250 WO HCPCS

## 2021-06-10 PROCEDURE — 20553 NJX 1/MLT TRIGGER POINTS 3/>: CPT

## 2021-06-10 PROCEDURE — 20553 NJX 1/MLT TRIGGER POINTS 3/>: CPT | Performed by: NURSE PRACTITIONER

## 2021-06-10 RX ORDER — BUPIVACAINE HYDROCHLORIDE 5 MG/ML
10 INJECTION, SOLUTION EPIDURAL; INTRACAUDAL ONCE
Status: DISCONTINUED | OUTPATIENT
Start: 2021-06-10 | End: 2021-06-12 | Stop reason: HOSPADM

## 2021-06-10 RX ORDER — LIDOCAINE HYDROCHLORIDE 10 MG/ML
10 INJECTION, SOLUTION EPIDURAL; INFILTRATION; INTRACAUDAL; PERINEURAL ONCE
Status: DISCONTINUED | OUTPATIENT
Start: 2021-06-10 | End: 2021-06-12 | Stop reason: HOSPADM

## 2021-06-10 NOTE — PROCEDURES
procedure and the patient was discharged home with discharge instructions. The patient has been instructed to contact the office should there be any complications or questions to arise between today and their next appointment.     Plan:  [x] Will return to the office in  [] 1 month  [x]  4 - 6 weeks  [] 2 months   [] 3 months for:  [] Planned Procedure [x] Procedure Follow-up  [] Office Visit      1 [facility], 6/10/2021 at 10:23 AM

## 2021-06-14 DIAGNOSIS — M54.12 CERVICAL RADICULAR PAIN: ICD-10-CM

## 2021-06-14 RX ORDER — HYDROCODONE BITARTRATE AND ACETAMINOPHEN 7.5; 325 MG/1; MG/1
1 TABLET ORAL 2 TIMES DAILY PRN
Qty: 60 TABLET | Refills: 0 | Status: SHIPPED | OUTPATIENT
Start: 2021-06-17 | End: 2021-07-12 | Stop reason: SDUPTHER

## 2021-06-17 ENCOUNTER — TELEPHONE (OUTPATIENT)
Dept: PAIN MANAGEMENT | Age: 86
End: 2021-06-17

## 2021-07-12 DIAGNOSIS — M54.12 CERVICAL RADICULAR PAIN: ICD-10-CM

## 2021-07-13 RX ORDER — HYDROCODONE BITARTRATE AND ACETAMINOPHEN 7.5; 325 MG/1; MG/1
1 TABLET ORAL 2 TIMES DAILY PRN
Qty: 60 TABLET | Refills: 0 | Status: SHIPPED | OUTPATIENT
Start: 2021-07-17 | End: 2021-07-26 | Stop reason: SDUPTHER

## 2021-07-26 ENCOUNTER — HOSPITAL ENCOUNTER (OUTPATIENT)
Dept: PAIN MANAGEMENT | Age: 86
Discharge: HOME | End: 2021-07-26
Payer: MEDICARE

## 2021-07-26 VITALS
HEIGHT: 70 IN | DIASTOLIC BLOOD PRESSURE: 79 MMHG | TEMPERATURE: 97 F | HEART RATE: 68 BPM | OXYGEN SATURATION: 98 % | BODY MASS INDEX: 28.49 KG/M2 | SYSTOLIC BLOOD PRESSURE: 133 MMHG | WEIGHT: 199 LBS

## 2021-07-26 DIAGNOSIS — M54.12 CERVICAL RADICULAR PAIN: ICD-10-CM

## 2021-07-26 PROCEDURE — 99213 OFFICE O/P EST LOW 20 MIN: CPT | Performed by: NURSE PRACTITIONER

## 2021-07-26 PROCEDURE — 99213 OFFICE O/P EST LOW 20 MIN: CPT

## 2021-07-26 RX ORDER — HYDROCODONE BITARTRATE AND ACETAMINOPHEN 7.5; 325 MG/1; MG/1
1 TABLET ORAL 2 TIMES DAILY PRN
Qty: 60 TABLET | Refills: 0 | Status: SHIPPED | OUTPATIENT
Start: 2021-08-16 | End: 2021-09-15 | Stop reason: SDUPTHER

## 2021-07-26 RX ORDER — GABAPENTIN 100 MG/1
100 CAPSULE ORAL 2 TIMES DAILY
Qty: 180 CAPSULE | Refills: 0 | Status: SHIPPED | OUTPATIENT
Start: 2021-08-14 | End: 2021-11-15 | Stop reason: SDUPTHER

## 2021-07-26 ASSESSMENT — PAIN DESCRIPTION - PAIN TYPE: TYPE: CHRONIC PAIN

## 2021-07-26 ASSESSMENT — PAIN DESCRIPTION - LOCATION: LOCATION: NECK

## 2021-07-26 ASSESSMENT — PAIN SCALES - GENERAL: PAINLEVEL_OUTOF10: 7

## 2021-07-26 NOTE — PROGRESS NOTES
[facility] Physical and Pain Medicine    Office Progress Note    Patient Name: Sudeep Mario    MR #: [id number]    Account #: [de-identified]    : 1935    Age: 82 y.o. Sex: male    Date: 2021    PCP: REJI Lopez NP         Referring Provider:    Chief Complaint:   Chief Complaint   Patient presents with    Neck Pain       History of Present Illness:    Sudeep Mario is a 82 y.o. male who presents to the office for follow-up of bilateral cervical trigger point injections, bilateral lumbar trigger point injections and left shoulder injection. He says that he obtained 80% relief or 6 weeks. He is wanting them repeated. He continues to take his Norco and Gabapentin and does get some relief that allows him to complete ADL's. Last pain management HPI note read    Employment: Retired []   Disabled  []   Works []    Does Not Work [x]     Previous Injury:  Yes  []   No [x]     Previous Surgery: Yes []   No [x]     Previous Physical Therapy In the last 6 months? Yes  []     No [x]   Did Physical Therapy make thepain better or worse? Better []   Worse []  Unchanged []    MRI in the last two years? Yes [x]  No []   Results reviewed with patient? Yes []   No []    CT Scan in the last two years? Yes  []   No [x]   Results reviewed with patient? Yes []   No []    X-ray in the last two years? Yes [x]   No  []  Results reviewed with patient? Yes  []  No []    Injections in the past?  Yes [x]   No []   Did the injections help relieve the pain? Yes [x]   No []     Do you have Depression? Yes  []    No [x]   Thinking of harming yourself or others? Yes  []   No [x]     Past Medical Histoy  Past Medical History:   Diagnosis Date    Arthritis     Shoulder impingement     left       Surgery History  Past Surgical History:   Procedure Laterality Date    FINGER SURGERY Right         Allergies  Patient has no known allergies.      Current Medications  Current Outpatient Medications   Medication Sig Dispense Refill    Cream Base CREA [place] A Pain Cream #4 Add Gabapentin 6% Apply 1-2 pumps to affected area 3-4 times a day 300 g 5    calcipotriene (DOVONEX) 0.005 % cream   5    diclofenac (VOLTAREN) 50 MG EC tablet Take 1 tablet by mouth 2 times daily      [START ON 8/16/2021] HYDROcodone-acetaminophen (NORCO) 7.5-325 MG per tablet Take 1 tablet by mouth 2 times daily as needed for Pain for up to 30 days. May fill 8/16/2021 60 tablet 0    [START ON 8/14/2021] gabapentin (NEURONTIN) 100 MG capsule Take 1 capsule by mouth 2 times daily for 90 days. 180 capsule 0     No current facility-administered medications for this encounter. Social History    Social History     Tobacco Use    Smoking status: Former Smoker    Smokeless tobacco: Current User     Types: Chew   Substance Use Topics    Alcohol use: No         Family History  family history is not on file. Review of Systems:  Constitutional: denies fever, chills, fatigue, change in appetite, weight gain or weight loss  Head: Normocephalic  Skin: denies easy bruising, skin redness, skin rash, hives, sensitivity to sun exposure, tightness, nodules or bumps, hair loss, color changes in the hands or feet with cold. Eyes: denies pain, redness, loss of vision, double or blurred vision, eye drainage, or dryness. ENT and Mouth: denies ringing in the ears, loss of hearing, nasal congestion, nasal discharge, no hoarseness, sore throat, or difficulty swallowing   Respiratory: denies chronic dry cough, coughing up blood, coughing up mucus, waking at night coughing or choking, or wheezing.    Cardiovascular: denies chest pain, irregular heartbeats, palpitations, shortness of breath, or edema in legs  Gastrointestinal: denies, nausea, vomiting, heartburn, diarrhea, or constipation  Genitourinary: denies difficult urination, pain or burning with urination, blood in the urine, or cloudy urine  Musculoskeletal: denies beneficial to pain reduction, encouraged stretching exercise with a focus on torso strengthening. Education Provided:  Review of Daryl Reas [x] Agreement Review [x]  Reviewed PHQ-9  [x]    Review of Test [] Compliance Issues Discussed []   Cognitive Behavior Needs [] Exercise [x]  Financial Issues []   Tobacco/Alcohol Use [] Teaching  [x]     Goal:  Pain Management Goals of Therapy:   []        Resolution in pain  [x]        Decrease in pain level  [x]        Improvement in ADL's  [x]        Increase in activities with less pain  [x]        Decrease in medication      [] Benzodiazapine's and Narcotics:  Patient educated on the possible effects of combining Benzodiazapine's and Opioids. Explained \"Black Box Warnings\" such as; possible suppressed breathing, hypoxia, anoxia, depressed cognition, heart arrhythmia, coma and possible death. Patient verbalized understanding concerning possible effects. Controlled Substance Monitoring:  Attestation: The [facility] report for this patient was reviewed today. Discussed with patient possible medication side effects, risk of tolerance, dependence and alternative treatments. Discussed the growing epidemic in the [place] with the overprescribing and at times the abuse of narcotics. Discussed the detrimental effects of long term narcotic use. Patient encouraged to set daily goals of exercising and decreasing daily narcotic intake. Discussed with the patient about the development of hyperalgesia with long term narcotic intake. EMR dragon/transcription disclaimer: Much of this encounter note is electronic transcription/translation of spoken language to printed tach. Electronic translation of spoken language may be erroneous, or at times, nonsensical words or phrases may be inadvertently transcribed.  Although, I have reviewed the note for such errors, some may still exist.     CC:  REJI Seals - REJI Barbosa, 7/26/2021 at 11:03 AM

## 2021-09-15 DIAGNOSIS — M54.12 CERVICAL RADICULAR PAIN: ICD-10-CM

## 2021-09-15 RX ORDER — HYDROCODONE BITARTRATE AND ACETAMINOPHEN 7.5; 325 MG/1; MG/1
1 TABLET ORAL 2 TIMES DAILY PRN
Qty: 60 TABLET | Refills: 0 | Status: SHIPPED | OUTPATIENT
Start: 2021-09-15 | End: 2021-10-12 | Stop reason: SDUPTHER

## 2021-09-16 ENCOUNTER — HOSPITAL ENCOUNTER (OUTPATIENT)
Dept: PAIN MANAGEMENT | Age: 86
Discharge: HOME | End: 2021-09-16
Payer: MEDICARE

## 2021-09-16 VITALS
OXYGEN SATURATION: 94 % | HEART RATE: 82 BPM | TEMPERATURE: 96.1 F | SYSTOLIC BLOOD PRESSURE: 140 MMHG | RESPIRATION RATE: 20 BRPM | DIASTOLIC BLOOD PRESSURE: 66 MMHG

## 2021-09-16 PROCEDURE — 20553 NJX 1/MLT TRIGGER POINTS 3/>: CPT

## 2021-09-16 PROCEDURE — 6360000002 HC RX W HCPCS

## 2021-09-16 PROCEDURE — 20553 NJX 1/MLT TRIGGER POINTS 3/>: CPT | Performed by: NURSE PRACTITIONER

## 2021-09-16 PROCEDURE — 2500000003 HC RX 250 WO HCPCS

## 2021-09-16 PROCEDURE — 20610 DRAIN/INJ JOINT/BURSA W/O US: CPT

## 2021-09-16 PROCEDURE — 20611 DRAIN/INJ JOINT/BURSA W/US: CPT | Performed by: NURSE PRACTITIONER

## 2021-09-16 RX ORDER — BUPIVACAINE HYDROCHLORIDE 5 MG/ML
1 INJECTION, SOLUTION EPIDURAL; INTRACAUDAL ONCE
Status: DISCONTINUED | OUTPATIENT
Start: 2021-09-16 | End: 2021-09-18 | Stop reason: HOSPADM

## 2021-09-16 RX ORDER — TRIAMCINOLONE ACETONIDE 40 MG/ML
40 INJECTION, SUSPENSION INTRA-ARTICULAR; INTRAMUSCULAR ONCE
Status: DISCONTINUED | OUTPATIENT
Start: 2021-09-16 | End: 2021-09-18 | Stop reason: HOSPADM

## 2021-09-16 RX ORDER — LIDOCAINE HYDROCHLORIDE 10 MG/ML
1 INJECTION, SOLUTION EPIDURAL; INFILTRATION; INTRACAUDAL; PERINEURAL ONCE
Status: DISCONTINUED | OUTPATIENT
Start: 2021-09-16 | End: 2021-09-18 | Stop reason: HOSPADM

## 2021-09-16 RX ORDER — LIDOCAINE HYDROCHLORIDE 10 MG/ML
15 INJECTION, SOLUTION EPIDURAL; INFILTRATION; INTRACAUDAL; PERINEURAL ONCE
Status: DISCONTINUED | OUTPATIENT
Start: 2021-09-16 | End: 2021-09-18 | Stop reason: HOSPADM

## 2021-09-16 RX ORDER — BUPIVACAINE HYDROCHLORIDE 5 MG/ML
15 INJECTION, SOLUTION EPIDURAL; INTRACAUDAL ONCE
Status: DISCONTINUED | OUTPATIENT
Start: 2021-09-16 | End: 2021-09-18 | Stop reason: HOSPADM

## 2021-09-16 NOTE — LETTER
[facility]  [address]  [address]  Phone: [phone]  Fax: [address] REJI Londono        September 16, 2021     Patient: Joaquin Raymundo   YOB: 1935   Date of Visit: 9/16/2021       To Whom it May Concern:    Al Gary was seen in my clinic on 9/16/2021. Benita Hollins was his  today. If you have any questions or concerns, please don't hesitate to call.     Sincerely,         REJI Goyal

## 2021-09-16 NOTE — PROGRESS NOTES
Procedure:  Level of Consciousness: [x]Alert [x]Oriented []Disoriented []Lethargic  Anxiety Level: [x]Calm []Anxious []Depressed []Other  Skin: []Warm [x]Dry []Cool []Moist []Intact []Other  Cardiovascular: [x]Palpitations: [x]Never []Occasionally []Frequently  Chest Pain: [x]No []Yes  Respiratory:  [x]Unlabored []Labored []Cough ([] Productive []Unproductive)  HCG Required: [x]No []Yes   Results: []Negative []Positive  Knowledge Level:        [x]Patient/Other verbalized understanding of pre-procedure instructions. [x]Assessment of post-op care needs (transportation, responsible caregiver)        [x]Able to discuss health care problems and how to deal with it.   Factors that Affect Teaching:        Language Barrier: [x]No []Yes - why:        Hearing Loss:        []No [x]Yes            Corrective Device:  []Yes [x]No        Vision Loss:           [x]No []Yes            Corrective Device:  []Yes []No        Memory Loss:       [x]No []Yes            []Short Term []Long Term  Motivational Level:  [x]Asks Questions                  []Extremely Anxious       [x]Seems Interested               []Seems Uninterested                  [x]Denies need for Education  Risk for Injury:  [x]Patient oriented to person, place and time  []History of frequent falls/loss of balance  Nutritional:  []Change in appetite   []Weight Gain   []Weight Loss  Functional:  []Requires assistance with ADL's

## 2021-09-18 NOTE — PROCEDURES
[facility] Physical and Pain Medicine      Patient Name: Natacha Moreno    : 1935                    Age: 82 y.o. Sex: male    Date: 2021    Pre-op Diagnosis: Myofascial Pain/ Muscle Spasms/ Cervicalgia    Post-op Diagnosis: Myofascial Pain/ Muscle Spasms/ Cervicalgia    Procedure: Cervical Trigger Point Injections    Performing Procedure: Daisha Song, MSN, APRN, FNP-C    Previously Had Procedure:  Yes [x]  No []     No data found. Description of Procedure:     After a brief physical assessment and failure to improve with conservative measures the patient presented for Cervical Trigger Point Injections The indications, limitations and possible complications were discussed with the patient and the patient elected to proceed with the procedure. After voluntary, informed and signed consent obtained the patient was placed in a seated position. Appropriate time out was obtained per policy. The area of maximal tenderness was palpated over the [] Right   []  Left   [x]  Bilateral Cervical   [x] Splenius   [x] Trapezius  [x] Rhomboid. The skin overlying these areas was marked with a skin marker. The skin overlying the proposed injection sites were then sprayed with Gebauer's Solution while protecting patient eyes. The areas were then prepped in a sterile fashion with Prevantics swab. Each trigger point of the  [] Right   []   Left  [x]   Bilateral Cervical  [x] Splenius  [x] Trapezius   [x] Rhomboid   was then injected after negative aspiration using a 25 gauge 1 1/2 in needle with approximately 2 ml of a solution of     [x] 5 ml of 1% Lidocaine Plain and 5 ml of 0.5% Marcaine Plain per 10 ml syringe  [] Toradol 0.5 ml (30 mg/ml) per 10 ml syringe    Sterile dressings applied. [facility] Physical and Pain Medicine      Patient Name: Natacha Moreno    : 1935                    Age: 82 y.o.     Sex: male    Date: 2021    Pre-op Diagnosis: Myofascial Pain/ Muscle Spasms    Post-op Diagnosis: Myofascial Pain/ Muscle Spasms    Procedure: Lumbar Trigger Point Injections    Performing Procedure: Claudette Polanco, MSN, APRN, FNP-C    Previously Had Procedure:  [x] Yes    [] No    No data found. Description of Procedure:    After a brief physical assessment and failure to improve with conservative measures the patient presented for Lumbar Trigger Point Injections. The indications, limitations and possible complications were discussed with the patient and the patient elected to proceed with the procedure. After voluntary, informed and signed consent obtained the patient was placed in a   [x] Sitting  []  Prone position     Appropriate time out was obtained per policy. The area of maximal tenderness was palpated over the  [] Right   [] Left   [x] Bilateral Lower  [x] Latissimus  [x] Erector Spinae   [x] Lumbar Paraspinous. The skin overlying these areas was marked with a skin marker. The skin overlying the proposed injection sites were then sprayed with Gebauer's Solution and prepped in a sterile fashion with Prevantics swab. Each trigger point of the  [] Right   [] Left   [x] Bilateral Lower  [x] Latissimus  [x] Erector Spinae   [x] Lumbar Paraspinous  was then injected after negative aspiration using a 25 gauge 1 1/2 in needle with approximately 2 ml of a solution of     [x] 5 ml of 1% Lidocaine Plain and 5 ml of 0.5% Marcaine Plain per 10 ml syringe  [] Toradol 0.5 ml (30 mg/ml) per 10 ml syringe    Sterile dressings applied. [facility] Physical and Pain Medicine        Patient Name: Chloe Esquivel    : 1935    Age: 82 y.o.     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 Description of Procedure:    After a brief physical assessment and failure to improve with conservative measures the patient presented for an injection of the [] Right  [x] Left   [] Bilateral Shoulder(s). The indications, limitations and possible complications were discussed with the patient and the patient elected to proceed with the procedure. [] Right Shoulder    After voluntary, informed and signed consent obtained the patient was placed in a sitting position with the patients right arm placed to the side and elbow flexed at 90 degrees. Appropriate time out was obtained per policy. The proposed injection site was palpated at the point of maximal tenderness [] Anterior  [] Posterior [] Lateral and the skin over the proposed injection entry point was marked. The skin was then sprayed with Gebauer's Solution while protecting patients eyes and prepped in a sterile fashion with Prevantics swab. Under sterile technique a 22 gauge 1 1/2 inch needle was introduced and after a negative aspiration a solution of 1 ml of 0.5% Marcaine Plain, 1 ml of 1% Lidocaine Plain and       [] 1 ml of Kenalog (40mg/ml)   [] Toradol 30 mg/ml was injected. The needle was withdrawn and a sterile dressing applied. [x] Left Shoulder     After voluntary, informed and signed consent obtained the patient was placed in a sitting position with the patients left arm placed to the side and elbow flexed at 90 degrees. Appropriate time out was obtained per policy. The proposed injection site was palpated at the point of maximal tenderness  [] Anterior  [x] Posterior [] Lateral and the skin over the proposed injection entry point was marked.       The skin was then sprayed with Gebauer's Solution while protecting patients eyes and prepped in a sterile fashion with Prevantics swab. Under sterile technique a 22 gauge 1 1/2 inch needle was introduced and after a negative aspiration a solution of 1 ml of 0.5% Marcaine Plain, 1 ml of 1% Lidocaine Plain and     [x] 1 ml of Kenalog (40mg/ml)   [] Toradol 30 mg/ml was injected. The needle was withdrawn and a sterile dressing applied. Discharge: The patient tolerated the procedure well. There were no complications during the procedure and the patient was discharged home with discharge instructions. The patient has been instructed to contact the office should there be any complications or questions to arise between today and their next appointment.     Plan:  [x] Will return to the office in   [] 1 month  [x] 4 - 6 weeks   [] 2 months   []  3 months for:  [] Planned Procedure  [x] Procedure Follow-up   [] Office Visit      1 [facility], 9/5/2019 at 2:37 PM

## 2021-10-12 DIAGNOSIS — M54.12 CERVICAL RADICULAR PAIN: ICD-10-CM

## 2021-10-12 RX ORDER — HYDROCODONE BITARTRATE AND ACETAMINOPHEN 7.5; 325 MG/1; MG/1
1 TABLET ORAL 2 TIMES DAILY PRN
Qty: 60 TABLET | Refills: 0 | Status: SHIPPED | OUTPATIENT
Start: 2021-10-15 | End: 2021-11-15 | Stop reason: SDUPTHER

## 2021-10-25 DIAGNOSIS — G89.29 CHRONIC LEFT SHOULDER PAIN: ICD-10-CM

## 2021-10-25 DIAGNOSIS — M25.512 CHRONIC LEFT SHOULDER PAIN: ICD-10-CM

## 2021-10-26 RX ORDER — EMOLLIENT BASE
CREAM (GRAM) TOPICAL
Qty: 300 G | Refills: 5
Start: 2021-10-26 | End: 2022-04-12 | Stop reason: SDUPTHER

## 2021-11-15 ENCOUNTER — HOSPITAL ENCOUNTER (OUTPATIENT)
Dept: PAIN MANAGEMENT | Age: 86
Discharge: HOME | End: 2021-11-15
Payer: MEDICARE

## 2021-11-15 VITALS
WEIGHT: 201 LBS | HEIGHT: 72 IN | BODY MASS INDEX: 27.22 KG/M2 | DIASTOLIC BLOOD PRESSURE: 69 MMHG | TEMPERATURE: 97 F | OXYGEN SATURATION: 98 % | HEART RATE: 57 BPM | SYSTOLIC BLOOD PRESSURE: 116 MMHG

## 2021-11-15 DIAGNOSIS — M54.12 CERVICAL RADICULAR PAIN: ICD-10-CM

## 2021-11-15 PROCEDURE — 99213 OFFICE O/P EST LOW 20 MIN: CPT

## 2021-11-15 PROCEDURE — 99213 OFFICE O/P EST LOW 20 MIN: CPT | Performed by: NURSE PRACTITIONER

## 2021-11-15 RX ORDER — GABAPENTIN 100 MG/1
100 CAPSULE ORAL 2 TIMES DAILY
Qty: 180 CAPSULE | Refills: 0 | Status: SHIPPED | OUTPATIENT
Start: 2021-11-15 | End: 2022-03-10 | Stop reason: SDUPTHER

## 2021-11-15 RX ORDER — HYDROCODONE BITARTRATE AND ACETAMINOPHEN 7.5; 325 MG/1; MG/1
1 TABLET ORAL 2 TIMES DAILY PRN
Qty: 60 TABLET | Refills: 0 | Status: SHIPPED | OUTPATIENT
Start: 2021-11-15 | End: 2021-12-13 | Stop reason: SDUPTHER

## 2021-11-15 ASSESSMENT — PAIN DESCRIPTION - LOCATION: LOCATION: BACK;NECK

## 2021-11-15 ASSESSMENT — PAIN DESCRIPTION - PAIN TYPE: TYPE: CHRONIC PAIN

## 2021-11-15 ASSESSMENT — PAIN SCALES - GENERAL: PAINLEVEL_OUTOF10: 9

## 2021-11-15 NOTE — PROGRESS NOTES
Clinic Documentation      Education Provided:  [x] Review of Claudia Archer  [] Agreement Review  [x] PEG Score Calculated [] PHQ Score Calculated [] ORT Score Calculated    [] Compliance Issues Discussed [] Cognitive Behavior Needs [x] Exercise [] Review of Test [] Financial Issues  [x] Tobacco/Alcohol Use Reviewed [x] Teaching [] New Patient [] Picture Obtained    Physician Plan:  [] Outgoing Referral  [] Pharmacy Consult  [] Test Ordered [x] Prescription Ordered/Changed   [] Obtained Test Results / Consult Notes        Complete if patient is withholding blood thinner for procedure     Blood Thinner Patient is currently taking:      [] Plavix (Hold for 7 days)  [] Aspirin (Hold for 5 days)     [] Pletal (Hold for 2 days)  [] Pradaxa (Hold for 3 days)    [] Effient (Hold for 7 days)  [] Xarelto (Hold for 2 days)    [] Eliquis (Hold for 2 days)  [] Brilinta (Hold for 7 days)    [] Coumadin (Hold for 5 days) - (INR needs to be drawn the day prior to procedure- INR < 2.0)    [] Aggrenox (Hold for 7 days)        [] Patient will stop medication on their own.    [] Blood Thinner Form Faxed for approval to hold.    Provider form faxed to:   Assessment Completed by:  Electronically signed by Yisel Yi on 11/15/2021 at 9:40 AM

## 2021-11-15 NOTE — PROGRESS NOTES
History:   Procedure Laterality Date    FINGER SURGERY Right         Allergies  Patient has no known allergies. Current Medications  Current Outpatient Medications   Medication Sig Dispense Refill    HYDROcodone-acetaminophen (NORCO) 7.5-325 MG per tablet Take 1 tablet by mouth 2 times daily as needed for Pain for up to 30 days. May fill 11- 60 tablet 0    gabapentin (NEURONTIN) 100 MG capsule Take 1 capsule by mouth 2 times daily for 90 days. 180 capsule 0    Cream Base CREA [place] A Pain Cream #4 Add Gabapentin 6% Apply 1-2 pumps to affected area 3-4 times a day 300 g 5    calcipotriene (DOVONEX) 0.005 % cream   5    diclofenac (VOLTAREN) 50 MG EC tablet Take 1 tablet by mouth 2 times daily       No current facility-administered medications for this encounter. Social History    Social History     Tobacco Use    Smoking status: Former Smoker    Smokeless tobacco: Current User     Types: Chew   Substance Use Topics    Alcohol use: No         Family History  family history is not on file. Review of Systems:  Constitutional: denies fever, chills, fatigue, change in appetite, weight gain or weight loss  Head: Normocephalic  Skin: denies easy bruising, skin redness, skin rash, hives, sensitivity to sun exposure, tightness, nodules or bumps, hair loss, color changes in the hands or feet with cold. Eyes: denies pain, redness, loss of vision, double or blurred vision, eye drainage, or dryness. ENT and Mouth: denies ringing in the ears, loss of hearing, nasal congestion, nasal discharge, no hoarseness, sore throat, or difficulty swallowing   Respiratory: denies chronic dry cough, coughing up blood, coughing up mucus, waking at night coughing or choking, or wheezing.    Cardiovascular: denies chest pain, irregular heartbeats, palpitations, shortness of breath, or edema in legs  Gastrointestinal: denies, nausea, vomiting, heartburn, diarrhea, or constipation  Genitourinary: denies difficult gallops, rubs or murmurs, no edema  Abdomen: Bowel sounds in all quadrants, soft, non-distended, non-tender with palpation, no guarding  Extremities: No rash, cyanosis or bruising  Musculoskeletal: No joint swelling or deformity   Back Exam: Tenderness with palpation of muscles in lumbar area  Hip Exam: Full rotation bilateral   Knee Exam: Full flexion and extension bilateral, no crepitus  Shoulder Exam: Pain with rotation of left shoulder  Neurologic Exam: Gait and coordination normal, speech normal  Reflexes: Normal brachialis, Negative Garcia's bilateral. Normal Patellar bilateral,   CN EXAM: II-XII intact, face symmetrical, tongue symmetrical, the trapezius and sternocleidomastoid muscle appearance and strength symmetrical, normal achilles bilateral, ankle clonus negative bilateral  Strength: 5/5 RUE Bi's/Tri's, 5/5 LUE Bi's/Tri's, 5/5 RLE knee flex/ext, 5/5 RLE DF/PF, 5/5 LLE knee flex/ext, 5/5 LLE DF/PF  Sensation: Equal and intact to fine touch in all extremities  Mood and affect: Normal   Nurses note reviewed along with current vital signs    Active Problem(s)  Active Problems:    Chronic left shoulder pain    Myofascial muscle pain    Cervicalgia    Muscle spasms of neck    Muscle spasm of back  Resolved Problems:    * No resolved hospital problems. *                                                                                                                            PLAN:  1. Patient is to call the office with any questions or concerns that may arise prior to next appointment. 2. Continue Norco and Gabapentin  3. Schedule patient for bilateral cervical and lumbar trigger point injections and left shoulder injection      Urine Drug Screen Current/Today:  Yes  []  No [x]     Discussion:  Discussed exam findings and plan of care with patient. Patient agreed with the current plan of care at this time. All questions from the patient were answered by the provider.     Activity:   Discussed exercise as beneficial to pain reduction, encouraged stretching exercise with a focus on torso strengthening. Education Provided:  Review of Corky Paul [x] Agreement Review [x]  Reviewed PHQ-9  [x]    Review of Test [] Compliance Issues Discussed []   Cognitive Behavior Needs [] Exercise [x]  Financial Issues []   Tobacco/Alcohol Use [] Teaching  [x]     Goal:  Pain Management Goals of Therapy:   []        Resolution in pain  [x]        Decrease in pain level  [x]        Improvement in ADL's  [x]        Increase in activities with less pain  [x]        Decrease in medication      [] Benzodiazapine's and Narcotics:  Patient educated on the possible effects of combining Benzodiazapine's and Opioids. Explained \"Black Box Warnings\" such as; possible suppressed breathing, hypoxia, anoxia, depressed cognition, heart arrhythmia, coma and possible death. Patient verbalized understanding concerning possible effects. Controlled Substance Monitoring:  Attestation: The KAM report for this patient was reviewed today. Discussed with patient possible medication side effects, risk of tolerance, dependence and alternative treatments. Discussed the growing epidemic in the [place] with the overprescribing and at times the abuse of narcotics. Discussed the detrimental effects of long term narcotic use. Patient encouraged to set daily goals of exercising and decreasing daily narcotic intake. Discussed with the patient about the development of hyperalgesia with long term narcotic intake. EMR dragon/transcription disclaimer: Much of this encounter note is electronic transcription/translation of spoken language to printed tach. Electronic translation of spoken language may be erroneous, or at times, nonsensical words or phrases may be inadvertently transcribed.  Although, I have reviewed the note for such errors, some may still exist.     CC:  REJI Roman - REJI Kelly, 11/15/2021 at 9:58 AM

## 2021-12-13 DIAGNOSIS — M54.12 CERVICAL RADICULAR PAIN: ICD-10-CM

## 2021-12-13 RX ORDER — HYDROCODONE BITARTRATE AND ACETAMINOPHEN 7.5; 325 MG/1; MG/1
1 TABLET ORAL 2 TIMES DAILY PRN
Qty: 60 TABLET | Refills: 0 | Status: SHIPPED | OUTPATIENT
Start: 2021-12-15 | End: 2022-01-11 | Stop reason: SDUPTHER

## 2022-01-11 DIAGNOSIS — M54.12 CERVICAL RADICULAR PAIN: ICD-10-CM

## 2022-01-11 RX ORDER — HYDROCODONE BITARTRATE AND ACETAMINOPHEN 7.5; 325 MG/1; MG/1
1 TABLET ORAL 2 TIMES DAILY PRN
Qty: 60 TABLET | Refills: 0 | Status: SHIPPED | OUTPATIENT
Start: 2022-01-14 | End: 2022-02-14 | Stop reason: SDUPTHER

## 2022-01-27 ENCOUNTER — HOSPITAL ENCOUNTER (OUTPATIENT)
Dept: PAIN MANAGEMENT | Age: 87
Discharge: HOME | End: 2022-01-27
Payer: MEDICARE

## 2022-01-27 VITALS
HEART RATE: 98 BPM | DIASTOLIC BLOOD PRESSURE: 50 MMHG | SYSTOLIC BLOOD PRESSURE: 103 MMHG | OXYGEN SATURATION: 94 % | TEMPERATURE: 96.6 F | RESPIRATION RATE: 18 BRPM

## 2022-01-27 PROCEDURE — 20610 DRAIN/INJ JOINT/BURSA W/O US: CPT

## 2022-01-27 PROCEDURE — 2500000003 HC RX 250 WO HCPCS

## 2022-01-27 PROCEDURE — 6360000002 HC RX W HCPCS

## 2022-01-27 PROCEDURE — 20553 NJX 1/MLT TRIGGER POINTS 3/>: CPT

## 2022-01-27 PROCEDURE — 20553 NJX 1/MLT TRIGGER POINTS 3/>: CPT | Performed by: NURSE PRACTITIONER

## 2022-01-27 RX ORDER — TRIAMCINOLONE ACETONIDE 40 MG/ML
40 INJECTION, SUSPENSION INTRA-ARTICULAR; INTRAMUSCULAR ONCE
Status: DISCONTINUED | OUTPATIENT
Start: 2022-01-27 | End: 2022-01-27 | Stop reason: CLARIF

## 2022-01-27 RX ORDER — LIDOCAINE HYDROCHLORIDE 10 MG/ML
15 INJECTION, SOLUTION EPIDURAL; INFILTRATION; INTRACAUDAL; PERINEURAL ONCE
Status: DISCONTINUED | OUTPATIENT
Start: 2022-01-27 | End: 2022-01-29 | Stop reason: HOSPADM

## 2022-01-27 RX ORDER — BUPIVACAINE HYDROCHLORIDE 5 MG/ML
15 INJECTION, SOLUTION EPIDURAL; INTRACAUDAL ONCE
Status: DISCONTINUED | OUTPATIENT
Start: 2022-01-27 | End: 2022-01-29 | Stop reason: HOSPADM

## 2022-01-27 RX ORDER — LIDOCAINE HYDROCHLORIDE 10 MG/ML
1 INJECTION, SOLUTION EPIDURAL; INFILTRATION; INTRACAUDAL; PERINEURAL ONCE
Status: DISCONTINUED | OUTPATIENT
Start: 2022-01-27 | End: 2022-01-27

## 2022-01-27 RX ORDER — BUPIVACAINE HYDROCHLORIDE 5 MG/ML
1 INJECTION, SOLUTION EPIDURAL; INTRACAUDAL ONCE
Status: DISCONTINUED | OUTPATIENT
Start: 2022-01-27 | End: 2022-01-27

## 2022-01-27 NOTE — PROCEDURES
[facility] Physical and Pain Medicine      Patient Name: Dimitrios Vela    : 1935                    Age: 82 y.o. Sex: male    Date: 2022    Pre-op Diagnosis: Myofascial Pain/ Muscle Spasms/ Cervicalgia    Post-op Diagnosis: Myofascial Pain/ Muscle Spasms/ Cervicalgia    Procedure: Cervical Trigger Point Injections    Performing Procedure: Lisa Bob, MSN, APRN, FNP-C    Previously Had Procedure:  Yes [x]  No []     Patient Vitals for the past 24 hrs:   BP Temp Temp src Pulse Resp SpO2   22 1057 (!) 103/50 96.6 °F (35.9 °C) Temporal 98 18 94 %       Description of Procedure:     After a brief physical assessment and failure to improve with conservative measures the patient presented for Cervical Trigger Point Injections The indications, limitations and possible complications were discussed with the patient and the patient elected to proceed with the procedure. After voluntary, informed and signed consent obtained the patient was placed in a seated position. Appropriate time out was obtained per policy. The area of maximal tenderness was palpated over the [] Right   []  Left   [x]  Bilateral Cervical   [x] Splenius   [x] Trapezius  [x] Rhomboid. The skin overlying these areas was marked with a skin marker. The skin overlying the proposed injection sites were then sprayed with Gebauer's Solution while protecting patient eyes. The areas were then prepped in a sterile fashion with Prevantics swab. Each trigger point of the  [] Right   []   Left  [x]   Bilateral Cervical  [x] Splenius  [x] Trapezius   [x] Rhomboid   was then injected after negative aspiration using a 25 gauge 1 1/2 in needle with approximately 2 ml of a solution of     [x] 5 ml of 1% Lidocaine Plain and 5 ml of 0.5% Marcaine Plain per 10 ml syringe  [] Toradol 0.5 ml (30 mg/ml) per 10 ml syringe    Sterile dressings applied.                 [facility] and Pain with discharge instructions. The patient has been instructed to contact the office should there be any complications or questions to arise between today and their next appointment. Plan:  [x] Will return to the office in  [] 1 month  [x]  4 - 6 weeks  [] 2 months   [] 3 months for:  [] Planned Procedure [x] Procedure Follow-up  [] Office Visit      Left shoulder not completed. Had Covid injection last week.    1 [facility], 1/27/2022 at 11:22 AM

## 2022-02-10 ENCOUNTER — HOSPITAL ENCOUNTER (OUTPATIENT)
Dept: PAIN MANAGEMENT | Age: 87
Discharge: HOME | End: 2022-02-10
Payer: MEDICARE

## 2022-02-10 VITALS
HEART RATE: 79 BPM | TEMPERATURE: 97.3 F | DIASTOLIC BLOOD PRESSURE: 64 MMHG | OXYGEN SATURATION: 94 % | RESPIRATION RATE: 18 BRPM | SYSTOLIC BLOOD PRESSURE: 106 MMHG

## 2022-02-10 PROCEDURE — 2500000003 HC RX 250 WO HCPCS

## 2022-02-10 PROCEDURE — 20610 DRAIN/INJ JOINT/BURSA W/O US: CPT

## 2022-02-10 PROCEDURE — 20610 DRAIN/INJ JOINT/BURSA W/O US: CPT | Performed by: NURSE PRACTITIONER

## 2022-02-10 PROCEDURE — 6360000002 HC RX W HCPCS

## 2022-02-10 RX ORDER — TRIAMCINOLONE ACETONIDE 40 MG/ML
40 INJECTION, SUSPENSION INTRA-ARTICULAR; INTRAMUSCULAR ONCE
Status: DISCONTINUED | OUTPATIENT
Start: 2022-02-10 | End: 2022-02-12 | Stop reason: HOSPADM

## 2022-02-10 RX ORDER — BUPIVACAINE HYDROCHLORIDE 5 MG/ML
1 INJECTION, SOLUTION EPIDURAL; INTRACAUDAL ONCE
Status: DISCONTINUED | OUTPATIENT
Start: 2022-02-10 | End: 2022-02-12 | Stop reason: HOSPADM

## 2022-02-10 RX ORDER — LIDOCAINE HYDROCHLORIDE 10 MG/ML
1 INJECTION, SOLUTION EPIDURAL; INFILTRATION; INTRACAUDAL; PERINEURAL ONCE
Status: DISCONTINUED | OUTPATIENT
Start: 2022-02-10 | End: 2022-02-12 | Stop reason: HOSPADM

## 2022-02-10 NOTE — PROCEDURES
[facility] Physical and Pain Medicine        Patient Name: Wojciech Chau    : 1935    Age: 82 y.o. 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  []  Bilateral Shoulder(s)    Postop Diagnosis: Osteoarthritis of [] Right  [x]  Left  []  Bilateral Shoulder(s)    Procedure: Steroid Injection of  [] Right  [x]  Left   []  Bilateral Shoulder(s)    Performing Procedure:  Wili Ness, MT, APRN, FNP-C    Previously Had Procedure:   [x] Yes   []  No    Patient Vitals for the past 24 hrs:   BP Temp Temp src Pulse Resp SpO2   02/10/22 1109 106/64 97.3 °F (36.3 °C) Temporal 79 18 94 %       Description of Procedure:    After a brief physical assessment and failure to improve with conservative measures the patient presented for an injection of the [] Right  [x] Left   [] Bilateral Shoulder(s). The indications, limitations and possible complications were discussed with the patient and the patient elected to proceed with the procedure. [] Right Shoulder    After voluntary, informed and signed consent obtained the patient was placed in a sitting position with the patients right arm placed to the side and elbow flexed at 90 degrees. Appropriate time out was obtained per policy. The proposed injection site was palpated at the point of maximal tenderness [] Anterior  [] Posterior [] Lateral and the skin over the proposed injection entry point was marked. The skin was then sprayed with Gebauer's Solution while protecting patients eyes and prepped in a sterile fashion with Prevantics swab. Under sterile technique a 22 gauge 1 1/2 inch needle was introduced and after a negative aspiration a solution of 1 ml of 0.5% Marcaine Plain, 1 ml of 1% Lidocaine Plain and       [] 1 ml of Kenalog (40mg/ml)   [] Toradol 30 mg/ml was injected. The needle was withdrawn and a sterile dressing applied.     [x] Left Shoulder     After voluntary, informed and signed consent obtained the patient was placed in a sitting position with the patients left arm placed to the side and elbow flexed at 90 degrees. Appropriate time out was obtained per policy. The proposed injection site was palpated at the point of maximal tenderness  [] Anterior  [x] Posterior [] Lateral and the skin over the proposed injection entry point was marked. The skin was then sprayed with Gebauer's Solution while protecting patients eyes and prepped in a sterile fashion with Prevantics swab. Under sterile technique a 22 gauge 1 1/2 inch needle was introduced and after a negative aspiration a solution of 1 ml of 0.5% Marcaine Plain, 1 ml of 1% Lidocaine Plain and     [x] 1 ml of Kenalog (40mg/ml)   [] Toradol 30 mg/ml was injected. The needle was withdrawn and a sterile dressing applied. Discharge: The patient tolerated the procedure well. There were no complications during the procedure and the patient was discharged home with discharge instructions. The patient has been instructed to contact the office should there be any complications or questions to arise between today and their next appointment.     Plan:  [x] Will return to the office in   [] 1 month  [x] 4 - 6 weeks   [] 2 months   []  3 months for:  [] Planned Procedure  [x] Procedure Follow-up   [] Office Visit      1 [facility], 9/5/2019 at 2:37 PM

## 2022-02-14 DIAGNOSIS — M54.12 CERVICAL RADICULAR PAIN: ICD-10-CM

## 2022-02-14 RX ORDER — HYDROCODONE BITARTRATE AND ACETAMINOPHEN 7.5; 325 MG/1; MG/1
1 TABLET ORAL 2 TIMES DAILY PRN
Qty: 60 TABLET | Refills: 0 | Status: SHIPPED | OUTPATIENT
Start: 2022-02-14 | End: 2022-03-10 | Stop reason: SDUPTHER

## 2022-03-10 DIAGNOSIS — M54.12 CERVICAL RADICULAR PAIN: ICD-10-CM

## 2022-03-10 RX ORDER — HYDROCODONE BITARTRATE AND ACETAMINOPHEN 7.5; 325 MG/1; MG/1
1 TABLET ORAL 2 TIMES DAILY PRN
Qty: 60 TABLET | Refills: 0 | Status: SHIPPED | OUTPATIENT
Start: 2022-03-16 | End: 2022-04-12 | Stop reason: SDUPTHER

## 2022-03-10 RX ORDER — GABAPENTIN 100 MG/1
100 CAPSULE ORAL 2 TIMES DAILY
Qty: 180 CAPSULE | Refills: 0 | Status: SHIPPED | OUTPATIENT
Start: 2022-03-10 | End: 2022-06-13 | Stop reason: SDUPTHER

## 2022-04-12 DIAGNOSIS — G89.29 CHRONIC LEFT SHOULDER PAIN: ICD-10-CM

## 2022-04-12 DIAGNOSIS — M25.512 CHRONIC LEFT SHOULDER PAIN: ICD-10-CM

## 2022-04-12 DIAGNOSIS — M54.12 CERVICAL RADICULAR PAIN: ICD-10-CM

## 2022-04-12 RX ORDER — HYDROCODONE BITARTRATE AND ACETAMINOPHEN 7.5; 325 MG/1; MG/1
1 TABLET ORAL 2 TIMES DAILY PRN
Qty: 60 TABLET | Refills: 0 | Status: SHIPPED | OUTPATIENT
Start: 2022-04-15 | End: 2022-05-11 | Stop reason: SDUPTHER

## 2022-04-12 RX ORDER — EMOLLIENT BASE
CREAM (GRAM) TOPICAL
Qty: 200 G | Refills: 5
Start: 2022-04-12 | End: 2022-06-23 | Stop reason: SDUPTHER

## 2022-05-11 ENCOUNTER — HOSPITAL ENCOUNTER (OUTPATIENT)
Dept: PAIN MANAGEMENT | Age: 87
Discharge: HOME | End: 2022-05-11
Payer: MEDICARE

## 2022-05-11 VITALS
TEMPERATURE: 96.2 F | RESPIRATION RATE: 18 BRPM | DIASTOLIC BLOOD PRESSURE: 80 MMHG | WEIGHT: 201.13 LBS | SYSTOLIC BLOOD PRESSURE: 129 MMHG | OXYGEN SATURATION: 92 % | HEART RATE: 82 BPM | BODY MASS INDEX: 27.24 KG/M2 | HEIGHT: 72 IN

## 2022-05-11 DIAGNOSIS — M54.12 CERVICAL RADICULAR PAIN: ICD-10-CM

## 2022-05-11 PROCEDURE — 99213 OFFICE O/P EST LOW 20 MIN: CPT | Performed by: NURSE PRACTITIONER

## 2022-05-11 PROCEDURE — 99214 OFFICE O/P EST MOD 30 MIN: CPT

## 2022-05-11 RX ORDER — HYDROCODONE BITARTRATE AND ACETAMINOPHEN 7.5; 325 MG/1; MG/1
1 TABLET ORAL 2 TIMES DAILY PRN
Qty: 60 TABLET | Refills: 0 | Status: SHIPPED | OUTPATIENT
Start: 2022-05-15 | End: 2022-06-13 | Stop reason: SDUPTHER

## 2022-05-11 ASSESSMENT — PAIN SCALES - GENERAL: PAINLEVEL_OUTOF10: 9

## 2022-05-11 ASSESSMENT — PAIN DESCRIPTION - DESCRIPTORS: DESCRIPTORS: ACHING

## 2022-05-11 ASSESSMENT — PAIN DESCRIPTION - ORIENTATION: ORIENTATION: LOWER

## 2022-05-11 ASSESSMENT — PAIN DESCRIPTION - PAIN TYPE: TYPE: CHRONIC PAIN

## 2022-05-11 ASSESSMENT — PAIN DESCRIPTION - LOCATION: LOCATION: BACK;NECK

## 2022-05-11 ASSESSMENT — PAIN DESCRIPTION - FREQUENCY: FREQUENCY: CONTINUOUS

## 2022-05-11 NOTE — PROGRESS NOTES
[facility] Physical and Pain Medicine    Office Progress Note    Patient Name: Jacqulynn Nageotte    MR #: [id number]    Account #: [de-identified]    : 1935    Age: 82 y.o. Sex: male    Date: May 11, 2022    PCP: REJI Moeller NP         Referring Provider:    Chief Complaint:   Chief Complaint   Patient presents with    Lower Back Pain    Neck Pain       History of Present Illness:    Jacqulynn Nageotte is a 82 y.o. male who presents to the office for follow-up of bilateral cervical trigger point injections and bilateral lumbar trigger point injections. He says that he obtained 80% relief or 6 weeks. He is wanting them repeated. He also is follow up of left shoulder injection. He had 80% relief for 6 weeks. He is wanting them repeated. He continues to take his Norco and Gabapentin. He does get some relief that allows him to complete ADL's. Last pain management HPI note read    Employment: Retired []   Disabled  []   Works []    Does Not Work [x]     Previous Injury:  Yes  []   No [x]     Previous Surgery: Yes []   No [x]     Previous Physical Therapy In the last 6 months? Yes  []     No [x]   Did Physical Therapy make thepain better or worse? Better []   Worse []  Unchanged []    MRI in the last two years? Yes [x]  No []   Results reviewed with patient? Yes []   No []    CT Scan in the last two years? Yes  []   No [x]   Results reviewed with patient? Yes []   No []    X-ray in the last two years? Yes [x]   No  []  Results reviewed with patient? Yes  []  No []    Injections in the past?  Yes [x]   No []   Did the injections help relieve the pain? Yes [x]   No []     Do you have Depression? Yes  []    No [x]   Thinking of harming yourself or others?   Yes  []   No [x]     Past Medical Histoy  Past Medical History:   Diagnosis Date    Arthritis     Shoulder impingement     left       Surgery History  Past Surgical History:   Procedure Laterality Date    FINGER SURGERY Right         Allergies  Patient has no known allergies. Current Medications  Current Outpatient Medications   Medication Sig Dispense Refill    [START ON 5/15/2022] HYDROcodone-acetaminophen (NORCO) 7.5-325 MG per tablet Take 1 tablet by mouth 2 times daily as needed for Pain for up to 30 days. May fill 5- must last until 6- 60 tablet 0    Cream Base CREA [place] A Pain Cream #4 Add Gabapentin 6% Apply 1-2 pumps to affected area 3-4 times a day 200 g 5    gabapentin (NEURONTIN) 100 MG capsule Take 1 capsule by mouth 2 times daily for 90 days. 180 capsule 0    diclofenac (VOLTAREN) 50 MG EC tablet Take 1 tablet by mouth 2 times daily 180 tablet 0    calcipotriene (DOVONEX) 0.005 % cream   5     No current facility-administered medications for this encounter. Social History    Social History     Tobacco Use    Smoking status: Former Smoker    Smokeless tobacco: Current User     Types: Chew   Substance Use Topics    Alcohol use: No         Family History  family history is not on file. Review of Systems:  Constitutional: denies fever, chills, fatigue, change in appetite, weight gain or weight loss  Head: Normocephalic  Skin: denies easy bruising, skin redness, skin rash, hives, sensitivity to sun exposure, tightness, nodules or bumps, hair loss, color changes in the hands or feet with cold. Eyes: denies pain, redness, loss of vision, double or blurred vision, eye drainage, or dryness. ENT and Mouth: denies ringing in the ears, loss of hearing, nasal congestion, nasal discharge, no hoarseness, sore throat, or difficulty swallowing   Respiratory: denies chronic dry cough, coughing up blood, coughing up mucus, waking at night coughing or choking, or wheezing.    Cardiovascular: denies chest pain, irregular heartbeats, palpitations, shortness of breath, or edema in legs  Gastrointestinal: denies, nausea, vomiting, heartburn, diarrhea, or constipation  Genitourinary: denies difficult urination, pain or burning with urination, blood in the urine, or cloudy urine  Musculoskeletal: denies arm, buttock, thigh or calf cramps. Has pain in muscles of neck and bilateral shoulders, left shoulder, low back, muscle spasms in neck and bilateral shoulders and low back and tenderness in neck, bilateral shoulder, left shoulder and low back. No muscle weakness. No joint swelling. Neurologic: headache, dizziness, fainting, loss of consciousness, no memory loss. no sensitivity. Endocrine: denies intolerance to hot or cold temperature, night sweats, flushing, fingernail changes, increased thirst, or hairloss   Hematologic/ Lymphatic: denies anemia, bleeding tendency or clotting tendency, bruising easily. Allergic/ Immunologic: denies rhinitis, asthma, skin sensitivity, or allergy to Latex   Psychiatric: denies depression or thoughts of suicide, or voices in head. Current Pain Assessment:   Pain Assessment  Pain Assessment: 0-10  Pain Level: 9  Pain Location: Back,Neck  Pain Orientation: Lower  Pain Descriptors: Aching  Pain Type: Chronic pain  Pain Radiating Towards: Radiates left arm  Pain Frequency: Continuous    Clinical Progression: gradually improving  Effect of Pain on Daily Activities: limits activity  Patient's Stated Pain Goal: No pain  Pain Intervention(s): Medication (see eMar), Repositioning, Rest, Ice    Current PE    ORT Score: 3    PHQ-9 Score: 12    Physical Exam:    Vitals:    22 1305   BP: 129/80   Pulse: 82   Resp: 18   Temp: 96.2 °F (35.7 °C)   TempSrc: Temporal   SpO2: 92%   Weight: 201 lb 2 oz (91.2 kg)   Height: 6' (1.829 m)       Body mass index is 27.28 kg/m². General Appearance: no acute distress. Appears to be well dressed  Skin Exam: Warm and dry, no jaundice, rashes or lesions  Head Exam: NCAT, PERRLA, EOMI, moist mucus membranes, scalp normal  Neck Exam: Supple, no masses palpated, trachea midline.  Tenderness with palpation of neck and bilateral shoulder muscles  Lung: Clear to ausculation in all lobes anterior and posterior. Heart[de-identified] Regular rate and rhythm, no gallops, rubs or murmurs, no edema  Abdomen: Bowel sounds in all quadrants, soft, non-distended, non-tender with palpation, no guarding  Extremities: No rash, cyanosis or bruising  Musculoskeletal: No joint swelling or deformity   Back Exam: Tenderness with palpation of muscles in lumbar area  Hip Exam: Full rotation bilateral   Knee Exam: Full flexion and extension bilateral, no crepitus  Shoulder Exam: Pain with rotation of left shoulder  Neurologic Exam: Gait and coordination normal, speech normal  Reflexes: Normal brachialis, Negative Garcia's bilateral. Normal Patellar bilateral,   CN EXAM: II-XII intact, face symmetrical, tongue symmetrical, the trapezius and sternocleidomastoid muscle appearance and strength symmetrical, normal achilles bilateral, ankle clonus negative bilateral  Strength: 5/5 RUE Bi's/Tri's, 5/5 LUE Bi's/Tri's, 5/5 RLE knee flex/ext, 5/5 RLE DF/PF, 5/5 LLE knee flex/ext, 5/5 LLE DF/PF  Sensation: Equal and intact to fine touch in all extremities  Mood and affect: Normal   Nurses note reviewed along with current vital signs    Active Problem(s)  Active Problems:    Chronic left shoulder pain    Myofascial muscle pain    Cervicalgia    Muscle spasms of neck    Muscle spasm of back  Resolved Problems:    * No resolved hospital problems. *                                                                                                                            PLAN:  1. Patient is to call the office with any questions or concerns that may arise prior to next appointment. 2. Continue Norco and Gabapentin  3. Schedule patient for bilateral cervical and lumbar trigger point injections   4. Schedule patient for left shoulder injection    Urine Drug Screen Current/Today:  Yes  [x]  No []     Discussion:  Discussed exam findings and plan of care with patient.  Patient agreed with the current plan of care at this time. All questions from the patient were answered by the provider. Activity:   Discussed exercise as beneficial to pain reduction, encouraged stretching exercise with a focus on torso strengthening. Education Provided:  Review of Genie Forth [x] Agreement Review [x]  Reviewed PHQ-9  [x]    Review of Test [] Compliance Issues Discussed []   Cognitive Behavior Needs [] Exercise [x]  Financial Issues []   Tobacco/Alcohol Use [] Teaching  [x]     Goal:  Pain Management Goals of Therapy:   []        Resolution in pain  [x]        Decrease in pain level  [x]        Improvement in ADL's  [x]        Increase in activities with less pain  [x]        Decrease in medication      [] Benzodiazapine's and Narcotics:  Patient educated on the possible effects of combining Benzodiazapine's and Opioids. Explained \"Black Box Warnings\" such as; possible suppressed breathing, hypoxia, anoxia, depressed cognition, heart arrhythmia, coma and possible death. Patient verbalized understanding concerning possible effects. Controlled Substance Monitoring:  Attestation: The KAM report for this patient was reviewed today. Discussed with patient possible medication side effects, risk of tolerance, dependence and alternative treatments. Discussed the growing epidemic in the [place] with the overprescribing and at times the abuse of narcotics. Discussed the detrimental effects of long term narcotic use. Patient encouraged to set daily goals of exercising and decreasing daily narcotic intake. Discussed with the patient about the development of hyperalgesia with long term narcotic intake. EMR dragon/transcription disclaimer: Much of this encounter note is electronic transcription/translation of spoken language to printed tach. Electronic translation of spoken language may be erroneous, or at times, nonsensical words or phrases may be inadvertently transcribed.  Although, I have reviewed the note for such errors, some may still exist.     CC:  Katharine Franco, REJI - NP    Susan Kebede, REJI, 5/11/2022 at 1:25 PM

## 2022-05-11 NOTE — PROGRESS NOTES
Clinic Documentation      Education Provided:  [x] Review of Allegra Ha  [] Agreement Review  [x] PEG Score Calculated [x] PHQ Score Calculated [x] ORT Score Calculated    [] Compliance Issues Discussed [] Cognitive Behavior Needs [x] Exercise [] Review of Test [] Financial Issues  [x] Tobacco/Alcohol Use Reviewed [x] Teaching [] New Patient [] Picture Obtained    Physician Plan:  [] Outgoing Referral  [] Pharmacy Consult  [] Test Ordered [x] Prescription Ordered/Changed   [] Obtained Test Results / Consult Notes        Complete if patient is withholding blood thinner for procedure     Blood Thinner Patient is currently taking:      [] Plavix (Hold for 7 days)  [] Aspirin (Hold for 5 days)     [] Pletal (Hold for 2 days)  [] Pradaxa (Hold for 3 days)    [] Effient (Hold for 7 days)  [] Xarelto (Hold for 2 days)    [] Eliquis (Hold for 2 days)  [] Brilinta (Hold for 7 days)    [] Coumadin (Hold for 5 days) - (INR needs to be drawn the day prior to procedure- INR < 2.0)    [] Aggrenox (Hold for 7 days)        [] Patient will stop medication on their own.    [] Blood Thinner Form Faxed for approval to hold.    Provider form faxed to:    Assessment Completed by:  Electronically signed by Dov Stoddard RN on 5/11/2022 at 1:08 PM

## 2022-06-09 ENCOUNTER — HOSPITAL ENCOUNTER (OUTPATIENT)
Dept: PAIN MANAGEMENT | Age: 87
Discharge: HOME | End: 2022-06-09
Payer: MEDICARE

## 2022-06-09 VITALS
OXYGEN SATURATION: 95 % | SYSTOLIC BLOOD PRESSURE: 124 MMHG | RESPIRATION RATE: 18 BRPM | DIASTOLIC BLOOD PRESSURE: 72 MMHG | HEART RATE: 78 BPM | TEMPERATURE: 96.3 F

## 2022-06-09 PROCEDURE — 20553 NJX 1/MLT TRIGGER POINTS 3/>: CPT

## 2022-06-09 PROCEDURE — 20553 NJX 1/MLT TRIGGER POINTS 3/>: CPT | Performed by: NURSE PRACTITIONER

## 2022-06-09 PROCEDURE — 2500000003 HC RX 250 WO HCPCS

## 2022-06-09 RX ORDER — LIDOCAINE HYDROCHLORIDE 10 MG/ML
10 INJECTION, SOLUTION EPIDURAL; INFILTRATION; INTRACAUDAL; PERINEURAL ONCE
Status: DISCONTINUED | OUTPATIENT
Start: 2022-06-09 | End: 2022-06-11 | Stop reason: HOSPADM

## 2022-06-09 RX ORDER — BUPIVACAINE HYDROCHLORIDE 5 MG/ML
10 INJECTION, SOLUTION EPIDURAL; INTRACAUDAL ONCE
Status: DISCONTINUED | OUTPATIENT
Start: 2022-06-09 | End: 2022-06-11 | Stop reason: HOSPADM

## 2022-06-09 NOTE — PROCEDURES
[facility] Physical and Pain Medicine      Patient Name: Sun Stephenson    : 1935                    Age: 82 y.o. Sex: male    Date: 2022    Pre-op Diagnosis: Myofascial Pain/ Muscle Spasms/ Cervicalgia    Post-op Diagnosis: Myofascial Pain/ Muscle Spasms/ Cervicalgia    Procedure: Cervical Trigger Point Injections    Performing Procedure: Gina Barton, MSN, APRN, FNP-C    Previously Had Procedure:  Yes [x]  No []     Patient Vitals for the past 24 hrs:   BP Temp Temp src Pulse Resp SpO2   22 1027 124/72 (!) 96.3 °F (35.7 °C) Temporal 78 18 95 %       Description of Procedure:     After a brief physical assessment and failure to improve with conservative measures the patient presented for Cervical Trigger Point Injections The indications, limitations and possible complications were discussed with the patient and the patient elected to proceed with the procedure. After voluntary, informed and signed consent obtained the patient was placed in a seated position. Appropriate time out was obtained per policy. The area of maximal tenderness was palpated over the [] Right   []  Left   [x]  Bilateral Cervical   [x] Splenius   [x] Trapezius  [x] Rhomboid. The skin overlying these areas was marked with a skin marker. The skin overlying the proposed injection sites were then sprayed with Gebauer's Solution while protecting patient eyes. The areas were then prepped in a sterile fashion with Prevantics swab. Each trigger point of the  [] Right   []   Left  [x]   Bilateral Cervical  [x] Splenius  [x] Trapezius   [x] Rhomboid   was then injected after negative aspiration using a 25 gauge 1 1/2 in needle with approximately 2 ml of a solution of     [x] 5 ml of 1% Lidocaine Plain and 5 ml of 0.5% Marcaine Plain per 10 ml syringe  [] Toradol 0.5 ml (30 mg/ml) per 10 ml syringe    Sterile dressings applied.                 [facility] Physical and Pain Medicine      Patient Name: Vinay Hanson    : 1935                    Age: 82 y.o. Sex: male    Date: 2022    Pre-op Diagnosis: Myofascial Pain/ Muscle Spasms    Post-op Diagnosis: Myofascial Pain/ Muscle Spasms    Procedure: Lumbar Trigger Point Injections    Performing Procedure: Leonides Sheets, MSN, APRN, FNP-C    Previously Had Procedure:  [x] Yes    [] No    Patient Vitals for the past 24 hrs:   BP Temp Temp src Pulse Resp SpO2   22 1027 124/72 (!) 96.3 °F (35.7 °C) Temporal 78 18 95 %       Description of Procedure:    After a brief physical assessment and failure to improve with conservative measures the patient presented for Lumbar Trigger Point Injections. The indications, limitations and possible complications were discussed with the patient and the patient elected to proceed with the procedure. After voluntary, informed and signed consent obtained the patient was placed in a   [x] Sitting  []  Prone position     Appropriate time out was obtained per policy. The area of maximal tenderness was palpated over the  [] Right   [] Left   [x] Bilateral Lower  [x] Latissimus  [x] Erector Spinae   [x] Lumbar Paraspinous. The skin overlying these areas was marked with a skin marker. The skin overlying the proposed injection sites were then sprayed with Gebauer's Solution and prepped in a sterile fashion with Prevantics swab. Each trigger point of the  [] Right   [] Left   [x] Bilateral Lower  [x] Latissimus  [x] Erector Spinae   [x] Lumbar Paraspinous  was then injected after negative aspiration using a 25 gauge 1 1/2 in needle with approximately 2 ml of a solution of     [x] 5 ml of 1% Lidocaine Plain and 5 ml of 0.5% Marcaine Plain per 10 ml syringe  [] Toradol 0.5 ml (30 mg/ml) per 10 ml syringe    Sterile dressings applied. Discharge: The patient tolerated the procedure well.  There were no complications during the procedure and the patient was discharged home

## 2022-06-13 DIAGNOSIS — M54.12 CERVICAL RADICULAR PAIN: ICD-10-CM

## 2022-06-13 RX ORDER — HYDROCODONE BITARTRATE AND ACETAMINOPHEN 7.5; 325 MG/1; MG/1
1 TABLET ORAL 2 TIMES DAILY PRN
Qty: 60 TABLET | Refills: 0 | Status: SHIPPED | OUTPATIENT
Start: 2022-06-14 | End: 2022-06-22 | Stop reason: SDUPTHER

## 2022-06-13 RX ORDER — GABAPENTIN 100 MG/1
100 CAPSULE ORAL 2 TIMES DAILY
Qty: 180 CAPSULE | Refills: 0 | Status: SHIPPED | OUTPATIENT
Start: 2022-06-14 | End: 2022-08-15 | Stop reason: SDUPTHER

## 2022-06-20 ENCOUNTER — TELEPHONE (OUTPATIENT)
Dept: PAIN MANAGEMENT | Age: 87
End: 2022-06-20

## 2022-06-22 DIAGNOSIS — M54.12 CERVICAL RADICULAR PAIN: ICD-10-CM

## 2022-06-22 RX ORDER — HYDROCODONE BITARTRATE AND ACETAMINOPHEN 7.5; 325 MG/1; MG/1
1 TABLET ORAL 2 TIMES DAILY PRN
Qty: 60 TABLET | Refills: 0 | Status: SHIPPED | OUTPATIENT
Start: 2022-07-14 | End: 2022-08-15 | Stop reason: SDUPTHER

## 2022-06-23 DIAGNOSIS — M25.512 CHRONIC LEFT SHOULDER PAIN: ICD-10-CM

## 2022-06-23 DIAGNOSIS — G89.29 CHRONIC LEFT SHOULDER PAIN: ICD-10-CM

## 2022-06-23 RX ORDER — EMOLLIENT BASE
CREAM (GRAM) TOPICAL
Qty: 200 G | Refills: 5 | Status: SHIPPED | OUTPATIENT
Start: 2022-06-23

## 2022-07-07 ENCOUNTER — HOSPITAL ENCOUNTER (OUTPATIENT)
Dept: PAIN MANAGEMENT | Age: 87
Discharge: HOME | End: 2022-07-07
Payer: MEDICARE

## 2022-07-07 VITALS
DIASTOLIC BLOOD PRESSURE: 60 MMHG | RESPIRATION RATE: 18 BRPM | OXYGEN SATURATION: 92 % | HEART RATE: 76 BPM | SYSTOLIC BLOOD PRESSURE: 113 MMHG | TEMPERATURE: 96.8 F

## 2022-07-07 PROCEDURE — 20610 DRAIN/INJ JOINT/BURSA W/O US: CPT | Performed by: NURSE PRACTITIONER

## 2022-07-07 PROCEDURE — 20610 DRAIN/INJ JOINT/BURSA W/O US: CPT

## 2022-07-07 PROCEDURE — 2500000003 HC RX 250 WO HCPCS

## 2022-07-07 PROCEDURE — 6360000002 HC RX W HCPCS

## 2022-07-07 RX ORDER — BUPIVACAINE HYDROCHLORIDE 5 MG/ML
1 INJECTION, SOLUTION EPIDURAL; INTRACAUDAL ONCE
Status: DISCONTINUED | OUTPATIENT
Start: 2022-07-07 | End: 2022-07-09 | Stop reason: HOSPADM

## 2022-07-07 RX ORDER — LIDOCAINE HYDROCHLORIDE 10 MG/ML
1 INJECTION, SOLUTION EPIDURAL; INFILTRATION; INTRACAUDAL; PERINEURAL ONCE
Status: DISCONTINUED | OUTPATIENT
Start: 2022-07-07 | End: 2022-07-09 | Stop reason: HOSPADM

## 2022-07-07 RX ORDER — TRIAMCINOLONE ACETONIDE 40 MG/ML
40 INJECTION, SUSPENSION INTRA-ARTICULAR; INTRAMUSCULAR ONCE
Status: DISCONTINUED | OUTPATIENT
Start: 2022-07-07 | End: 2022-07-09 | Stop reason: HOSPADM

## 2022-07-07 NOTE — PROGRESS NOTES
[facility] Physical and Pain Medicine        Patient Name: Regina Ramirez    : 1935    Age: 82 y.o. 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  []  Bilateral Shoulder(s)    Postop Diagnosis: Osteoarthritis of [] Right  [x]  Left  []  Bilateral Shoulder(s)    Procedure: Steroid Injection of  [] Right  [x]  Left   []  Bilateral Shoulder(s)    Performing Procedure:  Angela Raya, MSN, APRN, FNP-C    Previously Had Procedure:   [x] Yes   []  No    Patient Vitals for the past 24 hrs:   BP Temp Temp src Pulse Resp SpO2   22 0931 113/60 96.8 °F (36 °C) Temporal 76 18 92 %       Description of Procedure:    After a brief physical assessment and failure to improve with conservative measures the patient presented for an injection of the [] Right  [x] Left   [] Bilateral Shoulder(s). The indications, limitations and possible complications were discussed with the patient and the patient elected to proceed with the procedure. [] Right Shoulder    After voluntary, informed and signed consent obtained the patient was placed in a sitting position with the patients right arm placed to the side and elbow flexed at 90 degrees. Appropriate time out was obtained per policy. The proposed injection site was palpated at the point of maximal tenderness [] Anterior  [] Posterior [] Lateral and the skin over the proposed injection entry point was marked. The skin was then sprayed with Gebauer's Solution while protecting patients eyes and prepped in a sterile fashion with Prevantics swab. Under sterile technique a 22 gauge 1 1/2 inch needle was introduced and after a negative aspiration a solution of 1 ml of 0.5% Marcaine Plain, 1 ml of 1% Lidocaine Plain and       [] 1 ml of Kenalog (40mg/ml)   [] Toradol 30 mg/ml was injected. The needle was withdrawn and a sterile dressing applied.     [x] Left Shoulder     After voluntary, informed and signed consent obtained the patient was placed in a sitting position with the patients left arm placed to the side and elbow flexed at 90 degrees. Appropriate time out was obtained per policy. The proposed injection site was palpated at the point of maximal tenderness  [] Anterior  [] Posterior [] Lateral and the skin over the proposed injection entry point was marked. The skin was then sprayed with Gebauer's Solution while protecting patients eyes and prepped in a sterile fashion with Prevantics swab. Under sterile technique a 22 gauge 1 1/2 inch needle was introduced and after a negative aspiration a solution of 1 ml of 0.5% Marcaine Plain, 1 ml of 1% Lidocaine Plain and     [x] 1 ml of Kenalog (40mg/ml)   [] Toradol 30 mg/ml was injected. The needle was withdrawn and a sterile dressing applied. Discharge: The patient tolerated the procedure well. There were no complications during the procedure and the patient was discharged home with discharge instructions. The patient has been instructed to contact the office should there be any complications or questions to arise between today and their next appointment.     Plan:  [x] Will return to the office in   [] 1 month  [x] 4 - 6 weeks   [] 2 months   []  3 months for:  [] Planned Procedure  [x] Procedure Follow-up   [] Office Visit      1 [facility], [facility], 9/5/2019 at 2:37 PM

## 2022-07-07 NOTE — PROGRESS NOTES
Procedure:  Level of Consciousness: [x]Alert [x]Oriented []Disoriented []Lethargic  Anxiety Level: [x]Calm []Anxious []Depressed []Other  Skin: [x]Warm [x]Dry []Cool []Moist []Intact []Other  Cardiovascular: [x]Palpitations: [x]Never []Occasionally []Frequently  Chest Pain: [x]No []Yes  Respiratory:  [x]Unlabored []Labored []Cough ([] Productive []Unproductive)  HCG Required: [x]No []Yes   Results: []Negative []Positive  Knowledge Level:        [x]Patient/Other verbalized understanding of pre-procedure instructions. [x]Assessment of post-op care needs (transportation, responsible caregiver)        [x]Able to discuss health care problems and how to deal with it.   Factors that Affect Teaching:        Language Barrier: []No []Yes - why:        Hearing Loss:        []No [x]Yes            Corrective Device:  []Yes [x]No        Vision Loss:           []No [x]Yes            Corrective Device:  []Yes [x]No        Memory Loss:       [x]No []Yes            []Short Term []Long Term  Motivational Level:  [x]Asks Questions                  []Extremely Anxious       [x]Seems Interested               []Seems Uninterested                  [x]Denies need for Education  Risk for Injury:  [x]Patient oriented to person, place and time  []History of frequent falls/loss of balance  Nutritional:  []Change in appetite   []Weight Gain   []Weight Loss  Functional:  []Requires assistance with ADL's

## 2022-07-18 ENCOUNTER — TELEPHONE (OUTPATIENT)
Dept: PAIN MANAGEMENT | Age: 87
End: 2022-07-18

## 2022-08-15 ENCOUNTER — HOSPITAL ENCOUNTER (OUTPATIENT)
Dept: PAIN MANAGEMENT | Age: 87
Discharge: HOME | End: 2022-08-15
Payer: MEDICARE

## 2022-08-15 VITALS
HEART RATE: 69 BPM | TEMPERATURE: 97 F | HEIGHT: 72 IN | SYSTOLIC BLOOD PRESSURE: 128 MMHG | BODY MASS INDEX: 26.41 KG/M2 | OXYGEN SATURATION: 95 % | DIASTOLIC BLOOD PRESSURE: 63 MMHG | WEIGHT: 195 LBS

## 2022-08-15 DIAGNOSIS — M54.12 CERVICAL RADICULAR PAIN: ICD-10-CM

## 2022-08-15 PROCEDURE — 99213 OFFICE O/P EST LOW 20 MIN: CPT | Performed by: NURSE PRACTITIONER

## 2022-08-15 PROCEDURE — 99213 OFFICE O/P EST LOW 20 MIN: CPT

## 2022-08-15 RX ORDER — HYDROCODONE BITARTRATE AND ACETAMINOPHEN 7.5; 325 MG/1; MG/1
1 TABLET ORAL 2 TIMES DAILY PRN
Qty: 60 TABLET | Refills: 0 | Status: SHIPPED | OUTPATIENT
Start: 2022-08-15 | End: 2022-09-12 | Stop reason: SDUPTHER

## 2022-08-15 RX ORDER — GABAPENTIN 100 MG/1
100 CAPSULE ORAL 2 TIMES DAILY
Qty: 180 CAPSULE | Refills: 0 | Status: SHIPPED | OUTPATIENT
Start: 2022-09-12 | End: 2022-12-11

## 2022-08-15 ASSESSMENT — PAIN SCALES - GENERAL: PAINLEVEL_OUTOF10: 7

## 2022-08-15 NOTE — PROGRESS NOTES
[facility] Physical and Pain Medicine    Office Progress Note    Patient Name: Monroe Nicole    MR #: [id number]    Account #: [de-identified]    : 1935    Age: 82 y.o. Sex: male    Date: August 15, 2022    PCP: REJI Russo NP         Referring Provider:    Chief Complaint:   Chief Complaint   Patient presents with    Neck Pain    Back Pain       History of Present Illness:    Monroe Nicole is a 82 y.o. male who presents to the office for follow-up of bilateral cervical trigger point injections and bilateral lumbar trigger point injections. He says that he obtained 80% relief or 6 weeks. He is wanting them repeated. He also is follow up of left shoulder injection. He had 80% relief for 5 weeks. He is wanting it repeated. He continues to take his Norco and Gabapentin. He does get some relief that allows him to complete ADL's. His daughter is with him today. Last pain management HPI note read    Employment: Retired []   Disabled  []   Works []    Does Not Work [x]     Previous Injury:  Yes  []   No [x]     Previous Surgery: Yes []   No [x]     Previous Physical Therapy In the last 6 months? Yes  []     No [x]   Did Physical Therapy make thepain better or worse? Better []   Worse []  Unchanged []    MRI in the last two years? Yes [x]  No []   Results reviewed with patient? Yes []   No []    CT Scan in the last two years? Yes  []   No [x]   Results reviewed with patient? Yes []   No []    X-ray in the last two years? Yes [x]   No  []  Results reviewed with patient? Yes  []  No []    Injections in the past?  Yes [x]   No []   Did the injections help relieve the pain? Yes [x]   No []     Do you have Depression? Yes  []    No [x]   Thinking of harming yourself or others?   Yes  []   No [x]     Past Medical Histoy  Past Medical History:   Diagnosis Date    Arthritis     Shoulder impingement     left       Surgery History  Past Surgical History:   Procedure Subjective:     Chief Complaint:   Chief Complaint   Patient presents with   • Follow-up       HPI:    Eduardo Luna is a 41 y.o. male here for follow-up of struct of sleep apnea.  Patient was last seen 2/14/2019 for mild obstructive sleep apnea.  He did turn his CPAP back in try the oral mandibular device.  Patient was still sleepy and snoring.  He had a deviated septum repair and inferior nasal turbinate surgery by Dr. Castillo.  Patient repeated a study 3/13/2019 that did show moderate obstructive sleep apnea.  Patient was amenable to trying CPAP again.  Patient is sleeping 4 hours nightly and does feel refreshed upon awakening.  Patient does not nap.  Patient does not snore.  Patient has an Tafton of 9/24.  Patient has no concerns or complaints today wishes to continue CPAP therapy.        Current medications are:   Current Outpatient Medications:   •  atorvastatin (LIPITOR) 10 MG tablet, Take 1 tablet by mouth Daily., Disp: 90 tablet, Rfl: 1  •  buPROPion XL (WELLBUTRIN XL) 300 MG 24 hr tablet, Take 1 tablet by mouth Daily., Disp: 90 tablet, Rfl: 1  •  Cholecalciferol (VITAMIN D3 PO), 4,000 Units., Disp: , Rfl:   •  CloNIDine (CATAPRES) 0.3 MG tablet, Take 1 tablet by mouth Every 8 (Eight) Hours As Needed for elevated BP, Disp: 180 tablet, Rfl: 0  •  diltiazem XR (DILACOR XR) 180 MG 24 hr capsule, Take 1 capsule by mouth Daily., Disp: 30 capsule, Rfl: 0  •  hydrOXYzine pamoate (VISTARIL) 25 MG capsule, Take 4 capsules by mouth 3 (Three) Times a Day As Needed for Anxiety., Disp: 180 capsule, Rfl: 2  •  levothyroxine (SYNTHROID, LEVOTHROID) 75 MCG tablet, Take 1 tablet by mouth Every Morning 1 hour prior to food or other medications, Disp: 90 tablet, Rfl: 1  •  raNITIdine (ZANTAC) 300 MG tablet, Take 1 tablet by mouth Every Night., Disp: 90 tablet, Rfl: 2  •  Testosterone (ANDROGEL PUMP) 20.25 MG/ACT (1.62%) gel, Apply 4 pumps daily, Disp: 75 g, Rfl: 5  •  vilazodone (VIIBRYD) 20 MG tablet tablet, Take 1 tablet  Laterality Date    FINGER SURGERY Right         Allergies  Patient has no known allergies. Current Medications  Current Outpatient Medications   Medication Sig Dispense Refill    HYDROcodone-acetaminophen (NORCO) 7.5-325 MG per tablet Take 1 tablet by mouth 2 times daily as needed for Pain for up to 30 days. May fill 8/15/2022 60 tablet 0    diclofenac (VOLTAREN) 50 MG EC tablet Take 1 tablet by mouth in the morning and 1 tablet in the evening. 180 tablet 0    [START ON 9/12/2022] gabapentin (NEURONTIN) 100 MG capsule Take 1 capsule by mouth in the morning and 1 capsule before bedtime. Do all this for 90 days. 180 capsule 0    C[employer] CREA [facility] Pain Cream #4 Add Gabapentin 6% Apply 1-2 pumps to affected area 3-4 times a day 200 g 5    calcipotriene (DOVONEX) 0.005 % cream   5     No current facility-administered medications for this encounter. Social History    Social History     Tobacco Use    Smoking status: Former    Smokeless tobacco: Current     Types: Chew   Substance Use Topics    Alcohol use: No         Family History  family history is not on file. Review of Systems:  Constitutional: denies fever, chills, fatigue, change in appetite, weight gain or weight loss  Head: Normocephalic  Skin: denies easy bruising, skin redness, skin rash, hives, sensitivity to sun exposure, tightness, nodules or bumps, hair loss, color changes in the hands or feet with cold. Eyes: denies pain, redness, loss of vision, double or blurred vision, eye drainage, or dryness. ENT and Mouth: denies ringing in the ears, loss of hearing, nasal congestion, nasal discharge, no hoarseness, sore throat, or difficulty swallowing   Respiratory: denies chronic dry cough, coughing up blood, coughing up mucus, waking at night coughing or choking, or wheezing.    Cardiovascular: denies chest pain, irregular heartbeats, palpitations, shortness of breath, or edema in legs  Gastrointestinal: denies, nausea, vomiting, heartburn, by mouth Daily., Disp: 90 tablet, Rfl: 1.      The patient's relevant past medical, surgical, family and social history were reviewed and updated in Epic as appropriate.       Review of Systems   HENT: Positive for postnasal drip.    Respiratory: Positive for apnea.    Endocrine: Positive for cold intolerance.   Allergic/Immunologic: Positive for environmental allergies.   Neurological: Positive for headaches.   Psychiatric/Behavioral: Positive for dysphoric mood and sleep disturbance. The patient is nervous/anxious.    All other systems reviewed and are negative.        Objective:    Physical Exam   Constitutional: He is oriented to person, place, and time. He appears well-developed and well-nourished.   HENT:   Head: Normocephalic and atraumatic.   Mouth/Throat: Oropharynx is clear and moist.   Mallampati 2 anatomy   Eyes: Conjunctivae are normal.   Neck: Neck supple. No thyromegaly present.   Cardiovascular: Normal rate and regular rhythm.   Pulmonary/Chest: Effort normal and breath sounds normal.   Lymphadenopathy:     He has no cervical adenopathy.   Neurological: He is alert and oriented to person, place, and time.   Skin: Skin is warm and dry.   Psychiatric: He has a normal mood and affect. His behavior is normal. Judgment and thought content normal.   Nursing note and vitals reviewed.  44/40 4 days of use.  Greater than 4-hour use 97.7.  AHI 3.9.  Download reviewed with patient.      ASSESSMENT/PLAN    Eduardo was seen today for follow-up.    Diagnoses and all orders for this visit:    JAVON (obstructive sleep apnea)  -     CPAP Therapy            1. Counseled patient regarding multimodal approach with healthy nutrition, healthy sleep, regular physical activity, social activities, counseling, and medications. Encouraged to practice lateral sleep position. Avoid alcohol and sedatives close to bedtime.  2. Refill supplies x1 year.  Return to clinic 1 year or sooner if symptoms warrant.    I have reviewed the results  diarrhea, or constipation  Genitourinary: denies difficult urination, pain or burning with urination, blood in the urine, or cloudy urine  Musculoskeletal: denies arm, buttock, thigh or calf cramps. Has pain in muscles of neck and bilateral shoulders, left shoulder, low back, muscle spasms in neck and bilateral shoulders and low back and tenderness in neck, bilateral shoulder, left shoulder and low back. No muscle weakness. No joint swelling. Neurologic: headache, dizziness, fainting, loss of consciousness, no memory loss. no sensitivity. Endocrine: denies intolerance to hot or cold temperature, night sweats, flushing, fingernail changes, increased thirst, or hairloss   Hematologic/ Lymphatic: denies anemia, bleeding tendency or clotting tendency, bruising easily. Allergic/ Immunologic: denies rhinitis, asthma, skin sensitivity, or allergy to Latex   Psychiatric: denies depression or thoughts of suicide, or voices in head. Current Pain Assessment:   Pain Assessment  Pain Assessment: 0-10  Pain Level: 7    Clinical Progression: gradually improving  Effect of Pain on Daily Activities: limits activity  Patient's Stated Pain Goal: No pain  Pain Intervention(s): Medication (see eMar), Repositioning, Rest, Ice    Current PE.2    ORT Score: 0    PHQ-9 Score: 12    Physical Exam:    Vitals:    08/15/22 1345   BP: 128/63   Pulse: 69   Temp: 97 °F (36.1 °C)   TempSrc: Temporal   SpO2: 95%   Weight: 195 lb (88.5 kg)   Height: 6' (1.829 m)       Body mass index is 26.45 kg/m². General Appearance: no acute distress. Appears to be well dressed  Skin Exam: Warm and dry, no jaundice, rashes or lesions  Head Exam: NCAT, PERRLA, EOMI, moist mucus membranes, scalp normal  Neck Exam: Supple, no masses palpated, trachea midline. Tenderness with palpation of neck and bilateral shoulder muscles  Lung: Clear to ausculation in all lobes anterior and posterior.    Heart[de-identified] Regular rate and rhythm, no gallops, rubs or murmurs, of my evaluation and impression and discussed my recommendations in detail with the patient.      Signed by  Sherri Stein, APRN    May 24, 2019      CC: Svetlana Henry MD          No ref. provider found         beneficial to pain reduction, encouraged stretching exercise with a focus on torso strengthening. Education Provided:  Review of Bella Bell [x] Agreement Review [x]  Reviewed PHQ-9  [x]    Review of Test [] Compliance Issues Discussed []   Cognitive Behavior Needs [] Exercise [x]  Financial Issues []   Tobacco/Alcohol Use [] Teaching  [x]     Goal:  Pain Management Goals of Therapy:   []        Resolution in pain  [x]        Decrease in pain level  [x]        Improvement in ADL's  [x]        Increase in activities with less pain  [x]        Decrease in medication      [] Benzodiazapine's and Narcotics:  Patient educated on the possible effects of combining Benzodiazapine's and Opioids. Explained \"Black Box Warnings\" such as; possible suppressed breathing, hypoxia, anoxia, depressed cognition, heart arrhythmia, coma and possible death. Patient verbalized understanding concerning possible effects. Controlled Substance Monitoring:  Attestation: The [facility] report for this patient was reviewed today. Discussed with patient possible medication side effects, risk of tolerance, dependence and alternative treatments. Discussed the growing epidemic in the [place] with the overprescribing and at times the abuse of narcotics. Discussed the detrimental effects of long term narcotic use. Patient encouraged to set daily goals of exercising and decreasing daily narcotic intake. Discussed with the patient about the development of hyperalgesia with long term narcotic intake. EMR dragon/transcription disclaimer: Much of this encounter note is electronic transcription/translation of spoken language to printed tach. Electronic translation of spoken language may be erroneous, or at times, nonsensical words or phrases may be inadvertently transcribed.  Although, I have reviewed the note for such errors, some may still exist.     CC:  REJI Dale - REJI Muhammad, 8/15/2022 at 2:02 PM

## 2022-08-15 NOTE — PROGRESS NOTES
Clinic Documentation      Education Provided:  [x] Review of Juan Salazar  [] Agreement Review  [x] PEG Score Calculated [] PHQ Score Calculated [] ORT Score Calculated    [] Compliance Issues Discussed [] Cognitive Behavior Needs [x] Exercise [] Review of Test [] Financial Issues  [x] Tobacco/Alcohol Use Reviewed [x] Teaching [] New Patient [] Picture Obtained    Physician Plan:  [] Outgoing Referral  [] Pharmacy Consult  [] Test Ordered [x] Prescription Ordered/Changed   [] Obtained Test Results / Consult Notes        Complete if patient is withholding blood thinner for procedure     Blood Thinner Patient is currently taking:      [] Plavix (Hold for 7 days)  [] Aspirin (Hold for 5 days)     [] Pletal (Hold for 2 days)  [] Pradaxa (Hold for 3 days)    [] Effient (Hold for 7 days)  [] Xarelto (Hold for 2 days)    [] Eliquis (Hold for 2 days)  [] Brilinta (Hold for 7 days)    [] Coumadin (Hold for 5 days) - (INR needs to be drawn the day prior to procedure- INR < 2.0)    [] Aggrenox (Hold for 7 days)        [] Patient will stop medication on their own.    [] Blood Thinner Form Faxed for approval to hold.    Provider form faxed to:     Assessment Completed by:  Electronically signed by Rocío Simon MA on 8/15/2022 at 2:03 PM

## 2022-09-12 DIAGNOSIS — M54.12 CERVICAL RADICULAR PAIN: ICD-10-CM

## 2022-09-12 RX ORDER — HYDROCODONE BITARTRATE AND ACETAMINOPHEN 7.5; 325 MG/1; MG/1
1 TABLET ORAL 2 TIMES DAILY PRN
Qty: 60 TABLET | Refills: 0 | Status: SHIPPED | OUTPATIENT
Start: 2022-09-14 | End: 2022-10-13 | Stop reason: SDUPTHER

## 2022-09-15 ENCOUNTER — HOSPITAL ENCOUNTER (OUTPATIENT)
Dept: PAIN MANAGEMENT | Age: 87
Discharge: HOME | End: 2022-09-15
Payer: MEDICARE

## 2022-09-15 VITALS
RESPIRATION RATE: 18 BRPM | HEART RATE: 84 BPM | SYSTOLIC BLOOD PRESSURE: 132 MMHG | TEMPERATURE: 96.2 F | OXYGEN SATURATION: 94 % | DIASTOLIC BLOOD PRESSURE: 61 MMHG

## 2022-09-15 PROCEDURE — 2500000003 HC RX 250 WO HCPCS

## 2022-09-15 PROCEDURE — 20553 NJX 1/MLT TRIGGER POINTS 3/>: CPT

## 2022-09-15 PROCEDURE — 20553 NJX 1/MLT TRIGGER POINTS 3/>: CPT | Performed by: NURSE PRACTITIONER

## 2022-09-15 RX ORDER — LIDOCAINE HYDROCHLORIDE 10 MG/ML
15 INJECTION, SOLUTION EPIDURAL; INFILTRATION; INTRACAUDAL; PERINEURAL ONCE
Status: DISCONTINUED | OUTPATIENT
Start: 2022-09-15 | End: 2022-09-17 | Stop reason: HOSPADM

## 2022-09-15 RX ORDER — BUPIVACAINE HYDROCHLORIDE 5 MG/ML
15 INJECTION, SOLUTION EPIDURAL; INTRACAUDAL ONCE
Status: DISCONTINUED | OUTPATIENT
Start: 2022-09-15 | End: 2022-09-17 | Stop reason: HOSPADM

## 2022-09-15 NOTE — PROGRESS NOTES
Procedure:  Level of Consciousness: [x]Alert [x]Oriented []Disoriented []Lethargic  Anxiety Level: [x]Calm []Anxious []Depressed []Other  Skin: [x]Warm [x]Dry []Cool []Moist []Intact []Other  Cardiovascular: [x]Palpitations: [x]Never []Occasionally []Frequently  Chest Pain: [x]No []Yes  Respiratory:  [x]Unlabored []Labored []Cough ([] Productive []Unproductive)  HCG Required: [x]No []Yes   Results: []Negative []Positive  Knowledge Level:        [x]Patient/Other verbalized understanding of pre-procedure instructions. [x]Assessment of post-op care needs (transportation, responsible caregiver)        [x]Able to discuss health care problems and how to deal with it.   Factors that Affect Teaching:        Language Barrier: [x]No []Yes - why:        Hearing Loss:        []No [x]Yes            Corrective Device:  []Yes [x]No        Vision Loss:           []No [x]Yes            Corrective Device:  [x]Yes []No        Memory Loss:       [x]No []Yes            []Short Term []Long Term  Motivational Level:  [x]Asks Questions                  []Extremely Anxious       [x]Seems Interested               []Seems Uninterested                  [x]Denies need for Education  Risk for Injury:  [x]Patient oriented to person, place and time  []History of frequent falls/loss of balance  Nutritional:  []Change in appetite   []Weight Gain   []Weight Loss  Functional:  []Requires assistance with ADL's

## 2022-09-15 NOTE — DISCHARGE INSTRUCTIONS
[address] [place] [address] Physical And Pain Medicine  Post Procedure Discharge Instructions        YOU HAVE HAD THE FOLLOWING PROCEDURE:                                  [] Occipital Nerve Blocks  [] CTS wrist injection(s)  [] Knee Injection(s)         [] Shoulder Injection(s)   [] Elbow Injection(s)     [] Botox Injection  [x] Cervical Trigger Point Injections    [] Thoracic Trigger Point Injections    [x] Lumbar Trigger Point Injections  [] Piriformis Trigger Point Injections  [] SI Joint Injection(s)     [] Trochanteric Bursa Injection(s)       [] Ankle Injection(s)   [] Plantar Fasciitis   []  ______________  Injection(s) [] Botox []  Migraines [] Spasticity    YOU HAVE RECEIVED THE FOLLOWING MEDICATIONS IN YOUR INJECTION(s)  [x] Lidocaine [x] Bupivacaine   [] DepoMedrol (steroid) [] Decadron (steroid)  []  Kenalog (steroid)   [] Toradol  [] Supartz [] Emani Shaylee    [] Botox        PATIENT INFORMATION:   You may experience the following symptoms after your procedure. These symptoms are normal and should not cause concern: You may have an increase in your pain. This may last 24 - 48 hours after your procedure. You may have no change in the pain that you had prior to your injection(s). You may have weakness or numbness in your affected extremity. If this occurs, this may last until numbing the medication wears off. REPORT THE FOLLOWING SYMPTOMS TO YOUR DOCTOR:  Redness, swelling or drainage at the injection site(s)  Unusual pain that interferes with your normal activities of daily living. OTHER INSTRUCTIONS:    [x] I will apply ice to the injection site(s) for at least 24 hours after the procedure. I will rotate the ice on for 20 minutes and off for 20 minutes for at least 24 hours. [x] I will not apply heat for at least 48 hours and I will not take a hot bath or shower for at least 24 hours.      [x] I understand that if Lidocaine or Bupivacaine was used in my injection(s) that the injection site(s) will be numb for a few hours after the procedure    [x] I understand if a steroid was used it will take effect in 3 - 7 days. I understand that as the numbing medication wears off, the pain may return until the steroids start working. [x] I understand that today I will rest for the next 24 hours and drink plenty of water. [] For Botox for Migraines please do not wear anything constricting around the forehead for 7-10 days post injection. Examples headband, hats, or bandana    [] For Botox for Spasticity start therapy for the affected limb in two weeks. [] Additional instructions: ______________________________________________ ___________________________________________________________________    Sedation:  Was oral sedation given? [] Yes  [x] No    I understand that if I took an oral nerve calming medication I will not drive for  [] 24 hours after taking the medication.     [x] I have received a copy of my discharge instructions and understand the above instructions to the best of my knowledge    Patient Discharged:  [x] Home  [] Hospital  [] Other  ____________________________________________    Via:  [x] Ambulatory  [] Wheelchair   [] Stretcher [] EMS       Accompanied By:   [] Significant other  [x] Family Member  [] Friend   [] Hospital Staff  []  Ambulance Staff  [] Other_______________________________________________    Plan:  [x] Will return to the office in   [x] 1 month   [] 3 months for:  [] Procedure Follow-up  [] Office Visit   [x] Planned Procedure      Patient Signature: _____________________________________________________    Staff Signature: _______________________________________________________        If you have questions, problems or concerns you may call [phone] and follow the prompts    REJI Ruiz

## 2022-09-16 NOTE — PROCEDURES
[facility] Physical and Pain Medicine      Patient Name: Bob Brock    : 1935                    Age: 82 y.o. Sex: male    Date: 9/15/2022    Pre-op Diagnosis: Myofascial Pain/ Muscle Spasms/ Cervicalgia    Post-op Diagnosis: Myofascial Pain/ Muscle Spasms/ Cervicalgia    Procedure: Cervical Trigger Point Injections    Performing Procedure: Dallin Granger, MSN, APRN, FNP-C    Previously Had Procedure:  Yes [x]  No []     No data found. Description of Procedure:     After a brief physical assessment and failure to improve with conservative measures the patient presented for Cervical Trigger Point Injections The indications, limitations and possible complications were discussed with the patient and the patient elected to proceed with the procedure. After voluntary, informed and signed consent obtained the patient was placed in a seated position. Appropriate time out was obtained per policy. The area of maximal tenderness was palpated over the [] Right   []  Left   [x]  Bilateral Cervical   [x] Splenius   [x] Trapezius  [x] Rhomboid. The skin overlying these areas was marked with a skin marker. The skin overlying the proposed injection sites were then sprayed with Gebauer's Solution while protecting patient eyes. The areas were then prepped in a sterile fashion with Prevantics swab. Each trigger point of the  [] Right   []   Left  [x]   Bilateral Cervical  [x] Splenius  [x] Trapezius   [x] Rhomboid   was then injected after negative aspiration using a 25 gauge 1 1/2 in needle with approximately 2 ml of a solution of     [x] 5 ml of 1% Lidocaine Plain and 5 ml of 0.5% Marcaine Plain per 10 ml syringe  [] Toradol 0.5 ml (30 mg/ml) per 10 ml syringe    Sterile dressings applied. [facility] Physical and Pain Medicine      Patient Name: Bob Brock    : 1935                    Age: 82 y.o.     Sex: male    Date: 9/15/2022    Pre-op Diagnosis: Myofascial Pain/ Muscle Spasms    Post-op Diagnosis: Myofascial Pain/ Muscle Spasms    Procedure: Lumbar Trigger Point Injections    Performing Procedure: Melony Norris, MSN, APRN, FNP-C    Previously Had Procedure:  [x] Yes    [] No    No data found. Description of Procedure:    After a brief physical assessment and failure to improve with conservative measures the patient presented for Lumbar Trigger Point Injections. The indications, limitations and possible complications were discussed with the patient and the patient elected to proceed with the procedure. After voluntary, informed and signed consent obtained the patient was placed in a   [x] Sitting  []  Prone position     Appropriate time out was obtained per policy. The area of maximal tenderness was palpated over the  [] Right   [] Left   [x] Bilateral Lower  [x] Latissimus  [x] Erector Spinae   [x] Lumbar Paraspinous. The skin overlying these areas was marked with a skin marker. The skin overlying the proposed injection sites were then sprayed with Gebauer's Solution and prepped in a sterile fashion with Prevantics swab. Each trigger point of the  [] Right   [] Left   [x] Bilateral Lower  [x] Latissimus  [x] Erector Spinae   [x] Lumbar Paraspinous  was then injected after negative aspiration using a 25 gauge 1 1/2 in needle with approximately 2 ml of a solution of     [x] 5 ml of 1% Lidocaine Plain and 5 ml of 0.5% Marcaine Plain per 10 ml syringe  [] Toradol 0.5 ml (30 mg/ml) per 10 ml syringe    Sterile dressings applied. Discharge: The patient tolerated the procedure well. There were no complications during the procedure and the patient was discharged home with discharge instructions. The patient has been instructed to contact the office should there be any complications or questions to arise between today and their next appointment.     Plan:  [x] Will return to the office in  [] 1 month  [x]  4 - 6 weeks  [] 2 months   [] 3 months for:  [] Planned Procedure [x] Procedure Follow-up  [] Office Visit      Left shoulder not completed. Had Covid injection last week.    1 [facility], 9/16/2022 at 12:37 PM

## 2022-09-29 ENCOUNTER — TELEPHONE (OUTPATIENT)
Dept: PAIN MANAGEMENT | Age: 87
End: 2022-09-29

## 2022-10-13 ENCOUNTER — HOSPITAL ENCOUNTER (OUTPATIENT)
Dept: PAIN MANAGEMENT | Age: 87
Discharge: HOME | End: 2022-10-13
Payer: MEDICARE

## 2022-10-13 VITALS
HEART RATE: 98 BPM | RESPIRATION RATE: 18 BRPM | OXYGEN SATURATION: 93 % | DIASTOLIC BLOOD PRESSURE: 64 MMHG | SYSTOLIC BLOOD PRESSURE: 107 MMHG | TEMPERATURE: 96.5 F

## 2022-10-13 DIAGNOSIS — M54.12 CERVICAL RADICULAR PAIN: ICD-10-CM

## 2022-10-13 PROCEDURE — 2500000003 HC RX 250 WO HCPCS

## 2022-10-13 PROCEDURE — 6360000002 HC RX W HCPCS

## 2022-10-13 PROCEDURE — 20610 DRAIN/INJ JOINT/BURSA W/O US: CPT | Performed by: NURSE PRACTITIONER

## 2022-10-13 PROCEDURE — 20610 DRAIN/INJ JOINT/BURSA W/O US: CPT

## 2022-10-13 RX ORDER — LIDOCAINE HYDROCHLORIDE 10 MG/ML
1 INJECTION, SOLUTION EPIDURAL; INFILTRATION; INTRACAUDAL; PERINEURAL ONCE
Status: DISCONTINUED | OUTPATIENT
Start: 2022-10-13 | End: 2022-10-15 | Stop reason: HOSPADM

## 2022-10-13 RX ORDER — HYDROCODONE BITARTRATE AND ACETAMINOPHEN 7.5; 325 MG/1; MG/1
1 TABLET ORAL 2 TIMES DAILY PRN
Qty: 60 TABLET | Refills: 0 | Status: SHIPPED | OUTPATIENT
Start: 2022-10-14 | End: 2022-11-13

## 2022-10-13 RX ORDER — TRIAMCINOLONE ACETONIDE 40 MG/ML
40 INJECTION, SUSPENSION INTRA-ARTICULAR; INTRAMUSCULAR ONCE
Status: DISCONTINUED | OUTPATIENT
Start: 2022-10-13 | End: 2022-10-15 | Stop reason: HOSPADM

## 2022-10-13 RX ORDER — BUPIVACAINE HYDROCHLORIDE 5 MG/ML
1 INJECTION, SOLUTION EPIDURAL; INTRACAUDAL ONCE
Status: DISCONTINUED | OUTPATIENT
Start: 2022-10-13 | End: 2022-10-15 | Stop reason: HOSPADM

## 2022-10-13 NOTE — PROCEDURES
[facility] Physical and Pain Medicine        Patient Name: Mckenzie Maharaj    : 1935    Age: 82 y.o. 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  []  Bilateral Shoulder(s)    Postop Diagnosis: Osteoarthritis of [] Right  [x]  Left  []  Bilateral Shoulder(s)    Procedure: Steroid Injection of  [] Right  [x]  Left   []  Bilateral Shoulder(s)    Performing Procedure:  Maria C Rosa, MSN, APRN, FNP-C    Previously Had Procedure:   [x] Yes   []  No    Patient Vitals for the past 24 hrs:   BP Temp Temp src Pulse Resp SpO2   10/13/22 0954 107/64 (!) 96.5 °F (35.8 °C) Temporal 98 18 93 %       Description of Procedure:    After a brief physical assessment and failure to improve with conservative measures the patient presented for an injection of the [] Right  [x] Left   [] Bilateral Shoulder(s). The indications, limitations and possible complications were discussed with the patient and the patient elected to proceed with the procedure. [] Right Shoulder    After voluntary, informed and signed consent obtained the patient was placed in a sitting position with the patients right arm placed to the side and elbow flexed at 90 degrees. Appropriate time out was obtained per policy. The proposed injection site was palpated at the point of maximal tenderness [] Anterior  [] Posterior [] Lateral and the skin over the proposed injection entry point was marked. The skin was then sprayed with Gebauer's Solution while protecting patients eyes and prepped in a sterile fashion with Prevantics swab. Under sterile technique a 22 gauge 1 1/2 inch needle was introduced and after a negative aspiration a solution of 1 ml of 0.5% Marcaine Plain, 1 ml of 1% Lidocaine Plain and       [] 1 ml of Kenalog (40mg/ml)   [] Toradol 30 mg/ml was injected. The needle was withdrawn and a sterile dressing applied.     [x] Left Shoulder     After voluntary, informed and signed consent obtained the patient was placed in a sitting position with the patients left arm placed to the side and elbow flexed at 90 degrees. Appropriate time out was obtained per policy. The proposed injection site was palpated at the point of maximal tenderness  [] Anterior  [x] Posterior [] Lateral and the skin over the proposed injection entry point was marked. The skin was then sprayed with Gebauer's Solution while protecting patients eyes and prepped in a sterile fashion with Prevantics swab. Under sterile technique a 22 gauge 1 1/2 inch needle was introduced and after a negative aspiration a solution of 1 ml of 0.5% Marcaine Plain, 1 ml of 1% Lidocaine Plain and     [x] 1 ml of Kenalog (40mg/ml)   [] Toradol 30 mg/ml was injected. The needle was withdrawn and a sterile dressing applied. Discharge: The patient tolerated the procedure well. There were no complications during the procedure and the patient was discharged home with discharge instructions. The patient has been instructed to contact the office should there be any complications or questions to arise between today and their next appointment.     Plan:  [x] Will return to the office in   [] 1 month  [x] 4 - 6 weeks   [] 2 months   []  3 months for:  [] Planned Procedure  [x] Procedure Follow-up   [] Office Visit      1 [facility], 9/5/2019 at 2:37 PM

## 2022-10-13 NOTE — DISCHARGE INSTRUCTIONS
[facility] Physical And Pain Medicine  Post Procedure Discharge Instructions        YOU HAVE HAD THE FOLLOWING PROCEDURE:                                  [] Occipital Nerve Blocks  [] CTS wrist injection(s)  [] Knee Injection(s)         [x] Shoulder Injection(s)   [] Elbow Injection(s)     [] Botox Injection  [] Cervical Trigger Point Injections    [] Thoracic Trigger Point Injections    [] Lumbar Trigger Point Injections  [] Piriformis Trigger Point Injections  [] SI Joint Injection(s)     [] Trochanteric Bursa Injection(s)       [] Ankle Injection(s)   [] Plantar Fasciitis   []  ______________  Injection(s) [] Botox []  Migraines [] Spasticity    YOU HAVE RECEIVED THE FOLLOWING MEDICATIONS IN YOUR INJECTION(s)  [x] Lidocaine [x] Bupivacaine   [] DepoMedrol (steroid) [] Decadron (steroid)  [x]  Kenalog (steroid)   [] Toradol  [] Supartz [] Richerd Quick    [] Botox        PATIENT INFORMATION:   You may experience the following symptoms after your procedure. These symptoms are normal and should not cause concern: You may have an increase in your pain. This may last 24 - 48 hours after your procedure. You may have no change in the pain that you had prior to your injection(s). You may have weakness or numbness in your affected extremity. If this occurs, this may last until numbing the medication wears off. REPORT THE FOLLOWING SYMPTOMS TO YOUR DOCTOR:  Redness, swelling or drainage at the injection site(s)  Unusual pain that interferes with your normal activities of daily living. OTHER INSTRUCTIONS:    [x] I will apply ice to the injection site(s) for at least 24 hours after the procedure. I will rotate the ice on for 20 minutes and off for 20 minutes for at least 24 hours. [x] I will not apply heat for at least 48 hours and I will not take a hot bath or shower for at least 24 hours.      [x] I understand that if Lidocaine or Bupivacaine was used in my injection(s) that the injection site(s) will be numb for a few hours after the procedure    [x] I understand if a steroid was used it will take effect in 3 - 7 days. I understand that as the numbing medication wears off, the pain may return until the steroids start working. [x] I understand that today I will rest for the next 24 hours and drink plenty of water. [] For Botox for Migraines please do not wear anything constricting around the forehead for 7-10 days post injection. Examples headband, hats, or bandana    [] For Botox for Spasticity start therapy for the affected limb in two weeks. [] Additional instructions: ______________________________________________ ___________________________________________________________________    Sedation:  Was oral sedation given? [] Yes  [x] No    I understand that if I took an oral nerve calming medication I will not drive for  [] 24 hours after taking the medication.     [x] I have received a copy of my discharge instructions and understand the above instructions to the best of my knowledge    Patient Discharged:  [x] Home  [] Hospital  [] Other  ____________________________________________    Via:  [x] Ambulatory  [] Wheelchair   [] Stretcher [] EMS       Accompanied By:   [] Significant other  [x] Family Member  [] Friend   [] Hospital Staff  []  Ambulance Staff  [] Other_______________________________________________    Plan:  [] Will return to the office in   [] 1 month   [] 3 months for:  [] Procedure Follow-up  [x] Office Visit   [x] Planned Procedure      Patient Signature: _____________________________________________________    Staff Signature: _______________________________________________________        If you have questions, problems or concerns you may call [phone] and follow the prompts

## 2022-10-13 NOTE — LETTER
[facility]  [address]  [address]  Phone: [phone]  Fax: [address] REJI Leary        October 13, 2022     Patient: Orlin Gilmore   YOB: 1935   Date of Visit: 10/13/2022       To Whom It May Concern: It is my medical opinion that Eugene Fuentes {participation release, (activity restriction):[id number]}. If you have any questions or concerns, please don't hesitate to call.     Sincerely,        REJI Herrera

## 2022-10-19 ENCOUNTER — OFFICE VISIT (OUTPATIENT)
Dept: GASTROENTEROLOGY | Facility: CLINIC | Age: 87
End: 2022-10-19

## 2022-10-19 VITALS
DIASTOLIC BLOOD PRESSURE: 67 MMHG | WEIGHT: 190 LBS | HEART RATE: 104 BPM | SYSTOLIC BLOOD PRESSURE: 96 MMHG | BODY MASS INDEX: 25.73 KG/M2 | HEIGHT: 72 IN

## 2022-10-19 DIAGNOSIS — K62.5 HEMORRHAGE OF ANUS AND RECTUM: Primary | ICD-10-CM

## 2022-10-19 PROCEDURE — 99204 OFFICE O/P NEW MOD 45 MIN: CPT | Performed by: INTERNAL MEDICINE

## 2022-10-19 RX ORDER — EMOLLIENT BASE
1 CREAM (GRAM) TOPICAL 3 TIMES DAILY PRN
COMMUNITY
Start: 2022-06-23

## 2022-10-19 RX ORDER — GABAPENTIN 100 MG/1
100 CAPSULE ORAL 2 TIMES DAILY
Qty: 60 CAPSULE | Refills: 3 | COMMUNITY
Start: 2022-09-12 | End: 2022-12-12

## 2022-10-19 RX ORDER — HYDROCODONE BITARTRATE AND ACETAMINOPHEN 7.5; 325 MG/1; MG/1
1 TABLET ORAL 2 TIMES DAILY
Qty: 60 TABLET | Refills: 1 | COMMUNITY
Start: 2022-10-14 | End: 2022-11-14

## 2022-10-19 RX ORDER — DEXTROSE AND SODIUM CHLORIDE 5; .45 G/100ML; G/100ML
30 INJECTION, SOLUTION INTRAVENOUS CONTINUOUS PRN
Status: CANCELLED | OUTPATIENT
Start: 2022-10-20

## 2022-10-20 ENCOUNTER — HOSPITAL ENCOUNTER (OUTPATIENT)
Facility: HOSPITAL | Age: 87
Setting detail: HOSPITAL OUTPATIENT SURGERY
Discharge: HOME | End: 2022-10-20
Attending: INTERNAL MEDICINE | Admitting: INTERNAL MEDICINE

## 2022-10-20 ENCOUNTER — ANESTHESIA (OUTPATIENT)
Dept: GASTROENTEROLOGY | Facility: HOSPITAL | Age: 87
End: 2022-10-20

## 2022-10-20 ENCOUNTER — ANESTHESIA EVENT (OUTPATIENT)
Dept: GASTROENTEROLOGY | Facility: HOSPITAL | Age: 87
End: 2022-10-20

## 2022-10-20 DIAGNOSIS — K62.5 HEMORRHAGE OF ANUS AND RECTUM: ICD-10-CM

## 2022-10-20 RX ORDER — DEXTROSE AND SODIUM CHLORIDE 5; .45 G/100ML; G/100ML
30 INJECTION, SOLUTION INTRAVENOUS CONTINUOUS PRN
Status: DISCONTINUED | OUTPATIENT
Start: 2022-10-20 | End: 2022-10-20 | Stop reason: HOSPADM

## 2022-10-20 NOTE — H&P (VIEW-ONLY)
"Chief Complaint:   Chief Complaint   Patient presents with   • Rectal Bleeding     [facility] count hosp follow up        Subjective     HPI:       Past Medical History:   History reviewed. No pertinent past medical history.    Past Surgical History:  Past Surgical History:   Procedure Laterality Date   • AMPUTATION     • APPENDECTOMY         Family History:  Family History   Problem Relation Age of Onset   • Cancer Other        Social History:   reports that he has quit smoking. He has quit using smokeless tobacco.  His smokeless tobacco use included chew. Drug use questions deferred to the physician. He reports that he does not drink alcohol.    Medications:   Prior to Admission medications    Medication Sig Start Date End Date Taking? Authorizing Provider   Cream Base cream Apply 1 application topically to the appropriate area as directed 3 (Three) Times a Day As Needed. 6/23/22  Yes Olga Shipley MD   diclofenac (VOLTAREN) 50 MG EC tablet Every 12 (Twelve) Hours.   Yes Olga Shipley MD   diclofenac (VOLTAREN) 50 MG EC tablet Take 1 tablet by mouth Daily. 8/15/22 11/14/22 Yes Olga Shipley MD   gabapentin (NEURONTIN) 100 MG capsule Take 1 capsule by mouth 2 (Two) Times a Day. 9/12/22 12/12/22 Yes Olga Shipley MD   HYDROcodone-acetaminophen (NORCO) 7.5-325 MG per tablet Take 1 tablet by mouth 2 (Two) Times a Day. 10/14/22 11/14/22 Yes Olga Shipley MD   azelastine (ASTELIN) 0.1 % nasal spray 2 sprays into the nostril(s) as directed by provider 2 (Two) Times a Day. Use in each nostril as directed 9/4/19   Stan Orozco PA   fluticasone (FLONASE) 50 MCG/ACT nasal spray 2 sprays into the nostril(s) as directed by provider Daily. 9/4/19   Stan Orozco PA   NON FORMULARY  8/27/19   ProviderOlga MD       Allergies:  Patient has no known allergies.    ROS:    Review of Systems  Objective     BP 96/67 (BP Location: Left arm)   Pulse 104   Ht 182.9 cm (72\")  "  Wt 86.2 kg (190 lb) Comment: patient weighted at pain managment  BMI 25.77 kg/m²     Physical Exam   Extremities: No edema, cyanosis or clubbing.    Assessment & Plan   Diagnoses and all orders for this visit:    1. Hemorrhage of anus and rectum (Primary)  -     Case Request; Standing  -     Cancel: dextrose 5 % and sodium chloride 0.45 % infusion  -     Case Request    Other orders  -     Follow Anesthesia Guidelines / Standing Orders; Future  -     Obtain Informed Consent; Future  -     Cancel: Implement Anesthesia Orders Day of Procedure; Standing  -     Cancel: Obtain Informed Consent; Standing  -     Cancel: POC Glucose Once; Standing  -     Cancel: Insert Peripheral IV; Standing        COLONOSCOPY (N/A)     Diagnosis Plan   1. Hemorrhage of anus and rectum  Case Request    Case Request          Anticipated Surgical Procedure:  Orders Placed This Encounter   Procedures   • Follow Anesthesia Guidelines / Standing Orders     Standing Status:   Future   • Obtain Informed Consent     Standing Status:   Future     Order Specific Question:   Informed Consent Given For     Answer:   colonoscopy       The risks, benefits, and alternatives of this procedure have been discussed with the patient or the responsible party- the patient understands and agrees to proceed.

## 2022-10-20 NOTE — ANESTHESIA PREPROCEDURE EVALUATION
Anesthesia Evaluation     Patient summary reviewed and Nursing notes reviewed   no history of anesthetic complications:  NPO Solid Status: > 8 hours  NPO Liquid Status: > 2 hours           Airway   Mallampati: II  TM distance: >3 FB  Neck ROM: limited  Possible difficult intubation  Dental    (+) poor dentition    Pulmonary     breath sounds clear to auscultation  (+) a smoker Former,   Cardiovascular     Rhythm: regular  Rate: normal        Neuro/Psych  GI/Hepatic/Renal/Endo    (+)  GI bleeding lower ,     Musculoskeletal     (+) chronic pain, neck pain,   Abdominal    Substance History      OB/GYN          Other   arthritis,                      Anesthesia Plan    ASA 2     general   total IV anesthesia  intravenous induction     Anesthetic plan, risks, benefits, and alternatives have been provided, discussed and informed consent has been obtained with: patient.        CODE STATUS:

## 2022-10-21 ENCOUNTER — ANESTHESIA (OUTPATIENT)
Dept: GASTROENTEROLOGY | Facility: HOSPITAL | Age: 87
End: 2022-10-21

## 2022-10-21 ENCOUNTER — HOSPITAL ENCOUNTER (OUTPATIENT)
Facility: HOSPITAL | Age: 87
Setting detail: HOSPITAL OUTPATIENT SURGERY
Discharge: HOME | End: 2022-10-21
Attending: INTERNAL MEDICINE | Admitting: INTERNAL MEDICINE

## 2022-10-21 ENCOUNTER — ANESTHESIA EVENT (OUTPATIENT)
Dept: GASTROENTEROLOGY | Facility: HOSPITAL | Age: 87
End: 2022-10-21

## 2022-10-21 VITALS
HEIGHT: 72 IN | BODY MASS INDEX: 25.73 KG/M2 | RESPIRATION RATE: 20 BRPM | TEMPERATURE: 97.4 F | OXYGEN SATURATION: 99 % | SYSTOLIC BLOOD PRESSURE: 144 MMHG | DIASTOLIC BLOOD PRESSURE: 68 MMHG | HEART RATE: 78 BPM | WEIGHT: 190 LBS

## 2022-10-21 DIAGNOSIS — K62.5 HEMORRHAGE OF ANUS AND RECTUM: ICD-10-CM

## 2022-10-21 PROCEDURE — 45385 COLONOSCOPY W/LESION REMOVAL: CPT | Performed by: INTERNAL MEDICINE

## 2022-10-21 PROCEDURE — 88305 TISSUE EXAM BY PATHOLOGIST: CPT

## 2022-10-21 RX ORDER — DEXTROSE AND SODIUM CHLORIDE 5; .45 G/100ML; G/100ML
20 INJECTION, SOLUTION INTRAVENOUS CONTINUOUS
Status: DISCONTINUED | OUTPATIENT
Start: 2022-10-21 | End: 2022-10-21 | Stop reason: HOSPADM

## 2022-10-21 RX ADMIN — DEXTROSE AND SODIUM CHLORIDE 20 ML/HR: 5; 450 INJECTION, SOLUTION INTRAVENOUS at 13:47

## 2022-10-21 NOTE — ANESTHESIA POSTPROCEDURE EVALUATION
Patient: Abelino Chaudhari    Procedure Summary     Date: 10/21/22 Room / Location: [facility] ENDOSCOPY 3 / [facility] ENDOSCOPY    Anesthesia Start: 1422 Anesthesia Stop: 1438    Procedure: COLONOSCOPY Diagnosis:       Hemorrhage of anus and rectum      (Hemorrhage of anus and rectum [K62.5])    Surgeons: Yanet Lopez MD Provider: Clayton Dixon CRNA    Anesthesia Type: general ASA Status: 3          Anesthesia Type: general    Vitals  No vitals data found for the desired time range.          Post Anesthesia Care and Evaluation    Patient location during evaluation: bedside  Patient participation: complete - patient participated  Level of consciousness: awake and awake and alert  Pain score: 0  Pain management: adequate    Airway patency: patent  Anesthetic complications: No anesthetic complications  PONV Status: none  Cardiovascular status: acceptable and stable  Respiratory status: acceptable, room air and spontaneous ventilation  Hydration status: acceptable    Comments: BP:  155/74  HR:  80  SAT:  98  RR:  18  TEMP: 97.5

## 2022-10-21 NOTE — INTERVAL H&P NOTE
Chief Complaint:        Chief Complaint   Patient presents with   • Rectal Bleeding       [facility] follow up             Subjective         HPI:   Mr. Chaudhari is a 87-year-old  male with past medical history of appendectomy, amputation, degenerative joint disease, presenting for evaluation for rectal bleeding.  He had several bouts of rectal bleeding since yesterday associated with lower abdominal cramping discomfort.  Seen at [facility] and subsequently was referred for further evaluation.  Rectal bleeding apparently had slowed down overnight.  Denies nausea or vomiting.  Weight is stable.  His nephew had colon cancer.  Hemoglobin was stable at [facility].     Past Medical History:   Medical History   History reviewed. No pertinent past medical history.     Impacted cerumen, degenerative joint disease, shoulder pain  Surgical History         Past Surgical History:  Past Surgical History:   Procedure Laterality Date   • AMPUTATION       • APPENDECTOMY                Family History:        Family History   Problem Relation Age of Onset   • Cancer Other           Social History:   reports that he has quit smoking. He has quit using smokeless tobacco.  His smokeless tobacco use included chew. Drug use questions deferred to the physician. He reports that he does not drink alcohol.     Medications:           Prior to Admission medications    Medication Sig Start Date End Date Taking? Authorizing Provider   Cream Base cream Apply 1 application topically to the appropriate area as directed 3 (Three) Times a Day As Needed. 6/23/22   Yes Olga Shipley MD   diclofenac (VOLTAREN) 50 MG EC tablet Every 12 (Twelve) Hours.     Yes Olga Shipley MD   diclofenac (VOLTAREN) 50 MG EC tablet Take 1 tablet by mouth Daily. 8/15/22 11/14/22 Yes Olga Shipley MD   gabapentin (NEURONTIN) 100 MG capsule Take 1 capsule by mouth 2 (Two) Times a Day. 9/12/22 12/12/22 Yes  "Provider, MD Olga   HYDROcodone-acetaminophen (NORCO) 7.5-325 MG per tablet Take 1 tablet by mouth 2 (Two) Times a Day. 10/14/22 11/14/22 Yes Olga Shipley MD   azelastine (ASTELIN) 0.1 % nasal spray 2 sprays into the nostril(s) as directed by provider 2 (Two) Times a Day. Use in each nostril as directed 9/4/19     Stan Orozco PA   fluticasone (FLONASE) 50 MCG/ACT nasal spray 2 sprays into the nostril(s) as directed by provider Daily. 9/4/19     Stan Orozco PA   NON FORMULARY   8/27/19     ProviderOlga MD         Allergies:  Patient has no known allergies.     ROS:    Review of Systems   Constitutional: Negative for chills, fatigue, fever and unexpected weight change.   HENT: Negative for congestion, ear discharge, hearing loss, nosebleeds and sore throat.    Eyes: Negative for pain, discharge and redness.   Respiratory: Negative for cough, chest tightness, shortness of breath and wheezing.    Cardiovascular: Negative for chest pain and palpitations.   Gastrointestinal: Positive for abdominal pain, anal bleeding and blood in stool. Negative for abdominal distention, constipation, diarrhea, nausea and vomiting.   Endocrine: Negative for cold intolerance, polydipsia, polyphagia and polyuria.   Genitourinary: Negative for dysuria, flank pain, frequency, hematuria and urgency.   Musculoskeletal: Negative for arthralgias, back pain, joint swelling and myalgias.   Skin: Negative for color change, pallor and rash.   Neurological: Negative for tremors, seizures, syncope, weakness and headaches.   Hematological: Negative for adenopathy. Does not bruise/bleed easily.   Psychiatric/Behavioral: Negative for behavioral problems, confusion, dysphoric mood, hallucinations and suicidal ideas. The patient is not nervous/anxious.             Objective         BP 96/67 (BP Location: Left arm)   Pulse 104   Ht 182.9 cm (72\")   Wt 86.2 kg (190 lb) Comment: patient weighted at pain " managment  BMI 25.77 kg/m²      Physical Exam  Constitutional:       Appearance: He is well-developed.   HENT:      Head: Normocephalic and atraumatic.   Eyes:      Conjunctiva/sclera: Conjunctivae normal.      Pupils: Pupils are equal, round, and reactive to light.   Neck:      Thyroid: No thyromegaly.   Cardiovascular:      Rate and Rhythm: Normal rate and regular rhythm.      Heart sounds: Normal heart sounds. No murmur heard.  Pulmonary:      Effort: Pulmonary effort is normal.      Breath sounds: Normal breath sounds. No wheezing.   Abdominal:      General: Bowel sounds are normal. There is no distension.      Palpations: Abdomen is soft. There is no mass.      Tenderness: There is no abdominal tenderness.      Hernia: No hernia is present.   Genitourinary:     Comments: No lesions noted  Musculoskeletal:         General: No tenderness. Normal range of motion.      Cervical back: Normal range of motion and neck supple.   Lymphadenopathy:      Cervical: No cervical adenopathy.   Skin:     General: Skin is warm and dry.      Findings: No rash.   Neurological:      Mental Status: He is alert and oriented to person, place, and time.      Cranial Nerves: No cranial nerve deficit.   Psychiatric:         Thought Content: Thought content normal.         Extremities: No edema, cyanosis or clubbing.           Assessment & Plan      1.  Lower abdominal pain with rectal bleeding rule out colorectal neoplasia, IBD and colitis.  Proceed with colonoscopy for further evaluation.  2.  Family history of colon cancer  Diagnoses and all orders for this visit:     1. Hemorrhage of anus and rectum (Primary)  -     Case Request; Standing  -     Cancel: dextrose 5 % and sodium chloride 0.45 % infusion  -     Case Request     Other orders  -     Follow Anesthesia Guidelines / Standing Orders; Future  -     Obtain Informed Consent; Future  -     Cancel: Implement Anesthesia Orders Day of Procedure; Standing  -     Cancel: Obtain  Informed Consent; Standing  -     Cancel: POC Glucose Once; Standing  -     Cancel: Insert Peripheral IV; Standing           COLONOSCOPY (N/A)       Diagnosis Plan   1. Hemorrhage of anus and rectum  Case Request     Case Request             Anticipated Surgical Procedure:        Orders Placed This Encounter   Procedures   • Follow Anesthesia Guidelines / Standing Orders       Standing Status:   Future   • Obtain Informed Consent       Standing Status:   Future       Order Specific Question:   Informed Consent Given For       Answer:   colonoscopy         The risks, benefits, and alternatives of this procedure have been discussed with the patient or the responsible party- the patient understands and agrees to proceed.    Risks and benefits discussed with patient.  Patient verbalized understanding and would like to proceed with procedure.   H&P reviewed. The patient was examined and there are no changes to the H&P.

## 2022-10-21 NOTE — ANESTHESIA PREPROCEDURE EVALUATION
Anesthesia Evaluation     Patient summary reviewed and Nursing notes reviewed   NPO Solid Status: > 8 hours  NPO Liquid Status: > 2 hours           Airway   Mallampati: II  TM distance: >3 FB  Neck ROM: limited  Possible difficult intubation  Dental    (+) poor dentition    Pulmonary - normal exam   (+) a smoker Former,   Cardiovascular - negative cardio ROS and normal exam        Neuro/Psych- negative ROS  GI/Hepatic/Renal/Endo    (+)  GI bleeding ,     Musculoskeletal     (+) neck pain,       ROS comment: DDD (degenerative disc disease), cervical  Abdominal  - normal exam   Substance History - negative use     OB/GYN          Other   arthritis,                      Anesthesia Plan    ASA 3     general   total IV anesthesia  intravenous induction     Anesthetic plan, risks, benefits, and alternatives have been provided, discussed and informed consent has been obtained with: patient.        CODE STATUS:

## 2022-10-24 ENCOUNTER — TELEPHONE (OUTPATIENT)
Dept: PAIN MANAGEMENT | Age: 87
End: 2022-10-24

## 2022-10-25 LAB — REF LAB TEST METHOD: NORMAL

## 2022-11-10 DIAGNOSIS — M54.12 CERVICAL RADICULAR PAIN: ICD-10-CM

## 2022-11-10 RX ORDER — HYDROCODONE BITARTRATE AND ACETAMINOPHEN 7.5; 325 MG/1; MG/1
1 TABLET ORAL 2 TIMES DAILY PRN
Qty: 60 TABLET | Refills: 0 | Status: SHIPPED | OUTPATIENT
Start: 2022-11-12 | End: 2022-12-12

## 2022-12-07 DIAGNOSIS — M54.12 CERVICAL RADICULAR PAIN: ICD-10-CM

## 2022-12-08 DIAGNOSIS — M54.12 CERVICAL RADICULAR PAIN: ICD-10-CM

## 2022-12-08 RX ORDER — HYDROCODONE BITARTRATE AND ACETAMINOPHEN 7.5; 325 MG/1; MG/1
1 TABLET ORAL 2 TIMES DAILY PRN
Qty: 60 TABLET | Refills: 0 | Status: SHIPPED | OUTPATIENT
Start: 2022-12-13 | End: 2023-01-12

## 2022-12-08 RX ORDER — GABAPENTIN 100 MG/1
100 CAPSULE ORAL 2 TIMES DAILY
Qty: 180 CAPSULE | Refills: 0 | Status: SHIPPED | OUTPATIENT
Start: 2022-12-08 | End: 2023-03-08

## 2023-01-10 DIAGNOSIS — M54.12 CERVICAL RADICULAR PAIN: ICD-10-CM

## 2023-01-12 RX ORDER — HYDROCODONE BITARTRATE AND ACETAMINOPHEN 7.5; 325 MG/1; MG/1
1 TABLET ORAL 2 TIMES DAILY PRN
Qty: 60 TABLET | Refills: 0 | Status: SHIPPED | OUTPATIENT
Start: 2023-01-12 | End: 2023-02-11

## 2023-01-18 ENCOUNTER — HOSPITAL ENCOUNTER (OUTPATIENT)
Dept: PAIN MANAGEMENT | Age: 88
Discharge: HOME | End: 2023-01-18
Payer: MEDICARE

## 2023-01-18 VITALS
RESPIRATION RATE: 20 BRPM | DIASTOLIC BLOOD PRESSURE: 58 MMHG | HEART RATE: 88 BPM | HEIGHT: 72 IN | TEMPERATURE: 97 F | BODY MASS INDEX: 26.45 KG/M2 | SYSTOLIC BLOOD PRESSURE: 85 MMHG | OXYGEN SATURATION: 94 %

## 2023-01-18 DIAGNOSIS — M54.12 CERVICAL RADICULAR PAIN: ICD-10-CM

## 2023-01-18 PROCEDURE — 99213 OFFICE O/P EST LOW 20 MIN: CPT

## 2023-01-18 PROCEDURE — 99214 OFFICE O/P EST MOD 30 MIN: CPT

## 2023-01-18 PROCEDURE — 99213 OFFICE O/P EST LOW 20 MIN: CPT | Performed by: NURSE PRACTITIONER

## 2023-01-18 RX ORDER — HYDROCODONE BITARTRATE AND ACETAMINOPHEN 7.5; 325 MG/1; MG/1
1 TABLET ORAL 2 TIMES DAILY PRN
Qty: 60 TABLET | Refills: 0 | Status: SHIPPED | OUTPATIENT
Start: 2023-02-11 | End: 2023-03-13

## 2023-01-18 ASSESSMENT — PAIN DESCRIPTION - LOCATION: LOCATION: NECK

## 2023-01-18 ASSESSMENT — PAIN SCALES - GENERAL: PAINLEVEL_OUTOF10: 8

## 2023-01-18 ASSESSMENT — PAIN DESCRIPTION - PAIN TYPE: TYPE: CHRONIC PAIN

## 2023-01-18 ASSESSMENT — PAIN DESCRIPTION - DESCRIPTORS: DESCRIPTORS: ACHING

## 2023-01-18 NOTE — PROGRESS NOTES
Clinic Documentation      Education Provided:  [x] Review of [place]  [] Agreement Review  [x] PEG Score Calculated [x] PHQ Score Calculated [x] ORT Score Calculated    [] Compliance Issues Discussed [] Cognitive Behavior Needs [x] Exercise [] Review of Test [] Financial Issues  [x] Tobacco/Alcohol Use Reviewed [x] Teaching [] New Patient [] Picture Obtained    Physician Plan:  [] Outgoing Referral  [] Pharmacy Consult  [] Test Ordered [x] Prescription Ordered/Changed   [] Obtained Test Results / Consult Notes        Complete if patient is withholding blood thinner for procedure     Blood Thinner Patient is currently taking:      [] Plavix (Hold for 7 days)  [] Aspirin (Hold for 5 days)     [] Pletal (Hold for 2 days)  [] Pradaxa (Hold for 3 days)    [] Effient (Hold for 7 days)  [] Xarelto (Hold for 2 days)    [] Eliquis (Hold for 2 days)  [] Brilinta (Hold for 7 days)    [] Coumadin (Hold for 5 days) - (INR needs to be drawn the day prior to procedure- INR < 2.0)    [] Aggrenox (Hold for 7 days)        [] Patient will stop medication on their own.    [] Blood Thinner Form Faxed for approval to hold.    Provider form faxed to:     Assessment Completed by:  Electronically signed by Manny Waldrop RN on 1/18/2023 at 2:24 PM

## 2023-01-18 NOTE — PROGRESS NOTES
[facility] Physical and Pain Medicine    Office Progress Note    Patient Name: Bernardo Lopez    MR #: [id number]    Account #: [de-identified]    : 1935    Age: 82 y.o. Sex: male    Date: 2023    PCP: REJI Graf NP         Referring Provider:    Chief Complaint:   Chief Complaint   Patient presents with    Neck Pain       History of Present Illness:    Bernardo Lopez is a 82 y.o. male who presents to the office for follow-up of bilateral cervical trigger point injections and bilateral lumbar trigger point injections. He says that he obtained 80% relief or 6 weeks. He is wanting them repeated, but he is wanting something that will last longer. He also is follow up of left shoulder injection. He had 80% relief for 5 weeks. He is wanting it repeated with something that will last longer. He continues to take his Norco and Gabapentin. He does get some relief that allows him to complete ADL's. His daughter is with him today. Last pain management HPI note read    Employment: Retired []   Disabled  []   Works []    Does Not Work [x]     Previous Injury:  Yes  []   No [x]     Previous Surgery: Yes []   No [x]     Previous Physical Therapy In the last 6 months? Yes  []     No [x]   Did Physical Therapy make thepain better or worse? Better []   Worse []  Unchanged []    MRI in the last two years? Yes [x]  No []   Results reviewed with patient? Yes []   No []    CT Scan in the last two years? Yes  []   No [x]   Results reviewed with patient? Yes []   No []    X-ray in the last two years? Yes [x]   No  []  Results reviewed with patient? Yes  []  No []    Injections in the past?  Yes [x]   No []   Did the injections help relieve the pain? Yes [x]   No []     Do you have Depression? Yes  []    No [x]   Thinking of harming yourself or others?   Yes  []   No [x]     Past Medical Histoy  Past Medical History:   Diagnosis Date    Arthritis     Shoulder impingement     left       Surgery History  Past Surgical History:   Procedure Laterality Date    FINGER SURGERY Right         Allergies  Patient has no known allergies. Current Medications  Current Outpatient Medications   Medication Sig Dispense Refill    [START ON 2/11/2023] HYDROcodone-acetaminophen (NORCO) 7.5-325 MG per tablet Take 1 tablet by mouth 2 times daily as needed for Pain for up to 30 days. May fill 2/11/2023 60 tablet 0    gabapentin (NEURONTIN) 100 MG capsule Take 1 capsule by mouth 2 times daily for 90 days. 180 capsule 0    diclofenac (VOLTAREN) 50 MG EC tablet Take 1 tablet by mouth in the morning and 1 tablet in the evening. 180 tablet 0    Cream Base CREA [facility] Pain Cream #4 Add Gabapentin 6% Apply 1-2 pumps to affected area 3-4 times a day 200 g 5    calcipotriene (DOVONEX) 0.005 % cream   5     No current facility-administered medications for this encounter. Social History    Social History     Tobacco Use    Smoking status: Former    Smokeless tobacco: Current     Types: Chew   Substance Use Topics    Alcohol use: No         Family History  family history is not on file. Review of Systems:  Constitutional: denies fever, chills, fatigue, change in appetite, weight gain or weight loss  Head: Normocephalic  Skin: denies easy bruising, skin redness, skin rash, hives, sensitivity to sun exposure, tightness, nodules or bumps, hair loss, color changes in the hands or feet with cold. Eyes: denies pain, redness, loss of vision, double or blurred vision, eye drainage, or dryness. ENT and Mouth: denies ringing in the ears, loss of hearing, nasal congestion, nasal discharge, no hoarseness, sore throat, or difficulty swallowing   Respiratory: denies chronic dry cough, coughing up blood, coughing up mucus, waking at night coughing or choking, or wheezing.    Cardiovascular: denies chest pain, irregular heartbeats, palpitations, shortness of breath, or edema in legs  Gastrointestinal: denies, nausea, vomiting, heartburn, diarrhea, or constipation  Genitourinary: denies difficult urination, pain or burning with urination, blood in the urine, or cloudy urine  Musculoskeletal: denies arm, buttock, thigh or calf cramps. Has pain in muscles of neck and bilateral shoulders, left shoulder, low back, muscle spasms in neck and bilateral shoulders and low back and tenderness in neck, bilateral shoulder, left shoulder and low back. No muscle weakness. No joint swelling. Neurologic: headache, dizziness, fainting, loss of consciousness, no memory loss. no sensitivity. Endocrine: denies intolerance to hot or cold temperature, night sweats, flushing, fingernail changes, increased thirst, or hairloss   Hematologic/ Lymphatic: denies anemia, bleeding tendency or clotting tendency, bruising easily. Allergic/ Immunologic: denies rhinitis, asthma, skin sensitivity, or allergy to Latex   Psychiatric: denies depression or thoughts of suicide, or voices in head. Current Pain Assessment:   Pain Assessment  Pain Assessment: 0-10  Pain Level: 8  Pain Location: Neck  Pain Descriptors: Aching  Pain Type: Chronic pain    Clinical Progression: gradually improving  Effect of Pain on Daily Activities: limits activity  Patient's Stated Pain Goal: No pain  Pain Intervention(s): Medication (see eMar), Repositioning, Rest, Ice    Current PE    ORT Score: 0    PHQ-9 Score: 12    Physical Exam:    Vitals:    23 1415   BP: (!) 85/58   Pulse: 88   Resp: 20   Temp: 97 °F (36.1 °C)   TempSrc: Temporal   SpO2: 94%   Height: 6' (1.829 m)       Body mass index is 26.45 kg/m². General Appearance: no acute distress. Appears to be well dressed  Skin Exam: Warm and dry, no jaundice, rashes or lesions  Head Exam: NCAT, PERRLA, EOMI, moist mucus membranes, scalp normal  Neck Exam: Supple, no masses palpated, trachea midline.  Tenderness with palpation of neck and bilateral shoulder muscles  Lung: Clear to ausculation in all lobes anterior and posterior. Heart[de-identified] Regular rate and rhythm, no gallops, rubs or murmurs, no edema  Abdomen: Bowel sounds in all quadrants, soft, non-distended, non-tender with palpation, no guarding  Extremities: No rash, cyanosis or bruising  Musculoskeletal: No joint swelling or deformity   Back Exam: Tenderness with palpation of muscles in lumbar area  Hip Exam: Full rotation bilateral   Knee Exam: Full flexion and extension bilateral, no crepitus  Shoulder Exam: Pain with rotation of left shoulder  Neurologic Exam: Gait and coordination normal, speech normal  Reflexes: Normal brachialis, Negative Garcia's bilateral. Normal Patellar bilateral,   CN EXAM: II-XII intact, face symmetrical, tongue symmetrical, the trapezius and sternocleidomastoid muscle appearance and strength symmetrical, normal achilles bilateral, ankle clonus negative bilateral  Strength: 5/5 RUE Bi's/Tri's, 5/5 LUE Bi's/Tri's, 5/5 RLE knee flex/ext, 5/5 RLE DF/PF, 5/5 LLE knee flex/ext, 5/5 LLE DF/PF  Sensation: Equal and intact to fine touch in all extremities  Mood and affect: Normal   Nurses note reviewed along with current vital signs    Active Problem(s)  Active Problems:    Chronic left shoulder pain    Myofascial muscle pain    Cervicalgia    Muscle spasms of neck    Muscle spasm of back  Resolved Problems:    * No resolved hospital problems. *                                                                                                                            PLAN:  1. Patient is to call the office with any questions or concerns that may arise prior to next appointment. 2. Continue Norco, Gabapentin and Voltaren  3. Schedule patient for bilateral cervical and lumbar trigger point injections with Toradol  4. Schedule patient for left shoulder injection with Toradol    Urine Drug Screen Current/Today:  Yes  []  No [x]     Discussion:  Discussed exam findings and plan of care with patient.  Patient agreed with the current plan of care at this time. All questions from the patient were answered by the provider. Activity:   Discussed exercise as beneficial to pain reduction, encouraged stretching exercise with a focus on torso strengthening. Education Provided:  Review of [place] [x] Agreement Review [x]  Reviewed PHQ-9  [x]    Review of Test [] Compliance Issues Discussed []   Cognitive Behavior Needs [] Exercise [x]  Financial Issues []   Tobacco/Alcohol Use [] Teaching  [x]     Goal:  Pain Management Goals of Therapy:   []        Resolution in pain  [x]        Decrease in pain level  [x]        Improvement in ADL's  [x]        Increase in activities with less pain  [x]        Decrease in medication      [] Benzodiazapine's and Narcotics:  Patient educated on the possible effects of combining Benzodiazapine's and Opioids. Explained \"Black Box Warnings\" such as; possible suppressed breathing, hypoxia, anoxia, depressed cognition, heart arrhythmia, coma and possible death. Patient verbalized understanding concerning possible effects. Controlled Substance Monitoring:  Attestation: The [facility] report for this patient was reviewed today. Discussed with patient possible medication side effects, risk of tolerance, dependence and alternative treatments. Discussed the growing epidemic in the [place] with the overprescribing and at times the abuse of narcotics. Discussed the detrimental effects of long term narcotic use. Patient encouraged to set daily goals of exercising and decreasing daily narcotic intake. Discussed with the patient about the development of hyperalgesia with long term narcotic intake. EMR dragon/transcription disclaimer: Much of this encounter note is electronic transcription/translation of spoken language to printed tach. Electronic translation of spoken language may be erroneous, or at times, nonsensical words or phrases may be inadvertently transcribed.  Although, I have reviewed the note for such errors, some may still exist.     CC:  REJI Seals - REJI Barbosa, 1/18/2023 at 3:14 PM

## 2023-02-09 ENCOUNTER — HOSPITAL ENCOUNTER (OUTPATIENT)
Dept: PAIN MANAGEMENT | Age: 88
Discharge: HOME | End: 2023-02-09
Payer: MEDICARE

## 2023-02-09 VITALS
OXYGEN SATURATION: 94 % | DIASTOLIC BLOOD PRESSURE: 75 MMHG | RESPIRATION RATE: 18 BRPM | HEART RATE: 80 BPM | SYSTOLIC BLOOD PRESSURE: 91 MMHG | TEMPERATURE: 97.1 F

## 2023-02-09 PROCEDURE — 6360000002 HC RX W HCPCS

## 2023-02-09 PROCEDURE — 20553 NJX 1/MLT TRIGGER POINTS 3/>: CPT

## 2023-02-09 PROCEDURE — 2500000003 HC RX 250 WO HCPCS

## 2023-02-09 RX ORDER — LIDOCAINE HYDROCHLORIDE 10 MG/ML
15 INJECTION, SOLUTION EPIDURAL; INFILTRATION; INTRACAUDAL; PERINEURAL ONCE
Status: DISCONTINUED | OUTPATIENT
Start: 2023-02-09 | End: 2023-02-11 | Stop reason: HOSPADM

## 2023-02-09 RX ORDER — KETOROLAC TROMETHAMINE 30 MG/ML
45 INJECTION, SOLUTION INTRAMUSCULAR; INTRAVENOUS ONCE
Status: DISCONTINUED | OUTPATIENT
Start: 2023-02-09 | End: 2023-02-11 | Stop reason: HOSPADM

## 2023-02-09 RX ORDER — BUPIVACAINE HYDROCHLORIDE 5 MG/ML
15 INJECTION, SOLUTION EPIDURAL; INTRACAUDAL ONCE
Status: DISCONTINUED | OUTPATIENT
Start: 2023-02-09 | End: 2023-02-11 | Stop reason: HOSPADM

## 2023-02-09 NOTE — PROGRESS NOTES
Procedure:  Level of Consciousness: [x]Alert [x]Oriented []Disoriented []Lethargic  Anxiety Level: [x]Calm []Anxious []Depressed []Other  Skin: [x]Warm [x]Dry []Cool []Moist []Intact []Other  Cardiovascular: [x]Palpitations: [x]Never []Occasionally []Frequently  Chest Pain: [x]No []Yes  Respiratory:  [x]Unlabored []Labored []Cough ([x] Productive []Unproductive)  HCG Required: [x]No []Yes   Results: []Negative []Positive  Knowledge Level:        [x]Patient/Other verbalized understanding of pre-procedure instructions. [x]Assessment of post-op care needs (transportation, responsible caregiver)        [x]Able to discuss health care problems and how to deal with it.   Factors that Affect Teaching:        Language Barrier: [x]No []Yes - why:        Hearing Loss:        []No [x]Yes            Corrective Device:  [x]Yes []No        Vision Loss:           []No [x]Yes            Corrective Device:  [x]Yes []No        Memory Loss:       [x]No []Yes            []Short Term []Long Term  Motivational Level:  [x]Asks Questions                  []Extremely Anxious       [x]Seems Interested               []Seems Uninterested                  [x]Denies need for Education  Risk for Injury:  [x]Patient oriented to person, place and time  []History of frequent falls/loss of balance  Nutritional:  []Change in appetite   []Weight Gain   []Weight Loss  Functional:  []Requires assistance with ADL's

## 2023-02-09 NOTE — DISCHARGE INSTRUCTIONS
[facility] Physical And Pain Medicine  Post Procedure Discharge Instructions        YOU HAVE HAD THE FOLLOWING PROCEDURE:                                  [] Occipital Nerve Blocks  [] CTS wrist injection(s)  [] Knee Injection(s)         [] Shoulder Injection(s)   [] Elbow Injection(s)     [] Botox Injection  [x] Cervical Trigger Point Injections    [] Thoracic Trigger Point Injections    [x] Lumbar Trigger Point Injections  [] Piriformis Trigger Point Injections  [] SI Joint Injection(s)     [] Trochanteric Bursa Injection(s)       [] Ankle Injection(s)   [] Plantar Fasciitis   []  ______________  Injection(s) [] Botox []  Migraines [] Spasticity    YOU HAVE RECEIVED THE FOLLOWING MEDICATIONS IN YOUR INJECTION(s)  [x] Lidocaine [x] Bupivacaine   [] DepoMedrol (steroid) [] Decadron (steroid)  []  Kenalog (steroid)   [x] Toradol  [] Supartz [] Ursula Chelly    [] Botox        PATIENT INFORMATION:   You may experience the following symptoms after your procedure. These symptoms are normal and should not cause concern: You may have an increase in your pain. This may last 24 - 48 hours after your procedure. You may have no change in the pain that you had prior to your injection(s). You may have weakness or numbness in your affected extremity. If this occurs, this may last until numbing the medication wears off. REPORT THE FOLLOWING SYMPTOMS TO YOUR DOCTOR:  Redness, swelling or drainage at the injection site(s)  Unusual pain that interferes with your normal activities of daily living. OTHER INSTRUCTIONS:    [x] I will apply ice to the injection site(s) for at least 24 hours after the procedure. I will rotate the ice on for 20 minutes and off for 20 minutes for at least 24 hours. [x] I will not apply heat for at least 48 hours and I will not take a hot bath or shower for at least 24 hours.      [x] I understand that if Lidocaine or Bupivacaine was used in my injection(s) that the injection site(s) will be numb for a few hours after the procedure    [x] I understand if a steroid was used it will take effect in 3 - 7 days. I understand that as the numbing medication wears off, the pain may return until the steroids start working. [] I understand that today I will rest for the next 24 hours and drink plenty of water. [] For Botox for Migraines please do not wear anything constricting around the forehead for 7-10 days post injection. Examples headband, hats, or bandana    [] For Botox for Spasticity start therapy for the affected limb in two weeks. [] Additional instructions: ______________________________________________ ___________________________________________________________________    Sedation:  Was oral sedation given? [] Yes  [x] No    I understand that if I took an oral nerve calming medication I will not drive for  [] 24 hours after taking the medication.     [x] I have received a copy of my discharge instructions and understand the above instructions to the best of my knowledge    Patient Discharged:  [x] Home  [] Hospital  [] Other  ____________________________________________    Via:  [x] Ambulatory  [] Wheelchair   [] Stretcher [] EMS       Accompanied By:   [] Significant other  [x] Family Member  [] Friend   [] Hospital Staff  []  Ambulance Staff  [] Other_______________________________________________    Plan:  [x] Will return to the office in   [] 1 month   [] 3 months for:  [] Procedure Follow-up  [] Office Visit   [x] Planned Procedure      Patient Signature: _____________________________________________________    Staff Signature: _______________________________________________________        If you have questions, problems or concerns you may call [phone] and follow the prompts    Ethyl REJI Younger

## 2023-02-13 NOTE — PROCEDURES
[facility] Physical and Pain Medicine      Patient Name: Amy Triplett    : 1935                    Age: 82 y.o. Sex: male    Date: 2023    Pre-op Diagnosis: Myofascial Pain/ Muscle Spasms/ Cervicalgia    Post-op Diagnosis: Myofascial Pain/ Muscle Spasms/ Cervicalgia    Procedure: Cervical Trigger Point Injections    Performing Procedure: Emerson Barry, MSN, APRN, FNP-C    Previously Had Procedure:  Yes [x]  No []     No data found. Description of Procedure:     After a brief physical assessment and failure to improve with conservative measures the patient presented for Cervical Trigger Point Injections The indications, limitations and possible complications were discussed with the patient and the patient elected to proceed with the procedure. After voluntary, informed and signed consent obtained the patient was placed in a seated position. Appropriate time out was obtained per policy. The area of maximal tenderness was palpated over the [] Right   []  Left   [x]  Bilateral Cervical   [x] Splenius   [x] Trapezius  [x] Rhomboid. The skin overlying these areas was marked with a skin marker. The skin overlying the proposed injection sites were then sprayed with Gebauer's Solution while protecting patient eyes. The areas were then prepped in a sterile fashion with Prevantics swab. Each trigger point of the  [] Right   []   Left  [x]   Bilateral Cervical  [x] Splenius  [x] Trapezius   [x] Rhomboid   was then injected after negative aspiration using a 25 gauge 1 1/2 in needle with approximately 2 ml of a solution of     [x] 5 ml of 1% Lidocaine Plain and 5 ml of 0.5% Marcaine Plain per 10 ml syringe  [x] Toradol 0.5 ml (30 mg/ml) per 10 ml syringe    Sterile dressings applied.           REJI Casanova, 2023 at 6:21 PM             [facility] Physical and Pain Medicine      Patient Name: Amy Triplett    : 1935 Age: 82 y.o. Sex: male    Date: 2-9-2023    Pre-op Diagnosis: Myofascial Pain/ Muscle Spasms    Post-op Diagnosis: Myofascial Pain/ Muscle Spasms    Procedure: Lumbar Trigger Point Injections    Performing Procedure: Driss Purcell, MSN, APRN, FNP-C    Previously Had Procedure:  [x] Yes    [] No    No data found. Description of Procedure:    After a brief physical assessment and failure to improve with conservative measures the patient presented for Lumbar Trigger Point Injections. The indications, limitations and possible complications were discussed with the patient and the patient elected to proceed with the procedure. After voluntary, informed and signed consent obtained the patient was placed in a   [x] Sitting  []  Prone position     Appropriate time out was obtained per policy. The area of maximal tenderness was palpated over the  [] Right   [] Left   [x] Bilateral Lower  [x] Latissimus  [x] Erector Spinae   [x] Lumbar Paraspinous. The skin overlying these areas was marked with a skin marker. The skin overlying the proposed injection sites were then sprayed with Gebauer's Solution and prepped in a sterile fashion with Prevantics swab. Each trigger point of the  [] Right   [] Left   [x] Bilateral Lower  [x] Latissimus  [x] Erector Spinae   [x] Lumbar Paraspinous  was then injected after negative aspiration using a 25 gauge 1 1/2 in needle with approximately 2 ml of a solution of     [x] 5 ml of 1% Lidocaine Plain and 5 ml of 0.5% Marcaine Plain per 10 ml syringe  [x] Toradol 0.5 ml (30 mg/ml) per 10 ml syringe    Sterile dressings applied. Discharge: The patient tolerated the procedure well. There were no complications during the procedure and the patient was discharged home with discharge instructions. The patient has been instructed to contact the office should there be any complications or questions to arise between today and their next appointment.     Plan:  [x] Will return to the office in  [] 1 month  [x]  4 - 6 weeks  [] 2 months   [] 3 months for:  [] Planned Procedure [x] Procedure Follow-up  [] Office Visit      1 [facility], [place], 2/12/2023 at 6:21 PM

## 2023-02-16 ENCOUNTER — HOSPITAL ENCOUNTER (OUTPATIENT)
Dept: PAIN MANAGEMENT | Age: 88
Discharge: HOME | End: 2023-02-16
Payer: MEDICARE

## 2023-02-16 VITALS
RESPIRATION RATE: 18 BRPM | DIASTOLIC BLOOD PRESSURE: 76 MMHG | TEMPERATURE: 96.3 F | HEART RATE: 85 BPM | OXYGEN SATURATION: 95 % | SYSTOLIC BLOOD PRESSURE: 137 MMHG

## 2023-02-16 PROCEDURE — 2500000003 HC RX 250 WO HCPCS

## 2023-02-16 PROCEDURE — 6360000002 HC RX W HCPCS

## 2023-02-16 PROCEDURE — 20610 DRAIN/INJ JOINT/BURSA W/O US: CPT

## 2023-02-16 RX ORDER — LIDOCAINE HYDROCHLORIDE 10 MG/ML
1 INJECTION, SOLUTION EPIDURAL; INFILTRATION; INTRACAUDAL; PERINEURAL ONCE
Status: DISCONTINUED | OUTPATIENT
Start: 2023-02-16 | End: 2023-02-18 | Stop reason: HOSPADM

## 2023-02-16 RX ORDER — BUPIVACAINE HYDROCHLORIDE 5 MG/ML
1 INJECTION, SOLUTION EPIDURAL; INTRACAUDAL ONCE
Status: DISCONTINUED | OUTPATIENT
Start: 2023-02-16 | End: 2023-02-18 | Stop reason: HOSPADM

## 2023-02-16 RX ORDER — KETOROLAC TROMETHAMINE 30 MG/ML
30 INJECTION, SOLUTION INTRAMUSCULAR; INTRAVENOUS ONCE
Status: DISCONTINUED | OUTPATIENT
Start: 2023-02-16 | End: 2023-02-18 | Stop reason: HOSPADM

## 2023-02-16 NOTE — PROCEDURES
[facility] Physical and Pain Medicine        Patient Name: Ubaldo Rojas    : 1935    Age: 82 y.o. Sex: male    Date: 2023    Preop Diagnosis: Osteoarthritis of  [] Right  [x]  Left  []  Bilateral Shoulder(s)    Postop Diagnosis: Osteoarthritis of [] Right  [x]  Left  []  Bilateral Shoulder(s)    Procedure: Steroid Injection of  [] Right  [x]  Left   []  Bilateral Shoulder(s)    Performing Procedure:  Nathalia Garcia, MSN, APRN, FNP-C    Previously Had Procedure:   [x] Yes   []  No    Patient Vitals for the past 24 hrs:   BP Temp Temp src Pulse Resp SpO2   23 1044 137/76 (!) 96.3 °F (35.7 °C) Temporal 85 18 95 %       Description of Procedure:    After a brief physical assessment and failure to improve with conservative measures the patient presented for an injection of the [] Right  [x] Left   [] Bilateral Shoulder(s). The indications, limitations and possible complications were discussed with the patient and the patient elected to proceed with the procedure. [] Right Shoulder    After voluntary, informed and signed consent obtained the patient was placed in a sitting position with the patients right arm placed to the side and elbow flexed at 90 degrees. Appropriate time out was obtained per policy. The proposed injection site was palpated at the point of maximal tenderness [] Anterior  [] Posterior [] Lateral and the skin over the proposed injection entry point was marked. The skin was then sprayed with Gebauer's Solution while protecting patients eyes and prepped in a sterile fashion with Prevantics swab. Under sterile technique a 22 gauge 1 1/2 inch needle was introduced and after a negative aspiration a solution of 1 ml of 0.5% Marcaine Plain, 1 ml of 1% Lidocaine Plain and       [] 1 ml of Kenalog (40mg/ml)   [] Toradol 30 mg/ml was injected. The needle was withdrawn and a sterile dressing applied.     [x] Left Shoulder     After voluntary, informed and signed consent obtained the patient was placed in a sitting position with the patients left arm placed to the side and elbow flexed at 90 degrees. Appropriate time out was obtained per policy. The proposed injection site was palpated at the point of maximal tenderness  [] Anterior  [x] Posterior [] Lateral and the skin over the proposed injection entry point was marked. The skin was then sprayed with Gebauer's Solution while protecting patients eyes and prepped in a sterile fashion with Prevantics swab. Under sterile technique a 22 gauge 1 1/2 inch needle was introduced and after a negative aspiration a solution of 1 ml of 0.5% Marcaine Plain, 1 ml of 1% Lidocaine Plain and     [] 1 ml of Kenalog (40mg/ml)   [x] Toradol 30 mg/ml was injected. The needle was withdrawn and a sterile dressing applied. Discharge: The patient tolerated the procedure well. There were no complications during the procedure and the patient was discharged home with discharge instructions. The patient has been instructed to contact the office should there be any complications or questions to arise between today and their next appointment.     Plan:  [x] Will return to the office in   [] 1 month  [x] 4 - 6 weeks   [] 2 months   []  3 months for:  [] Planned Procedure  [x] Procedure Follow-up   [] Office Visit      1 [facility], 9/5/2019 at 2:37 PM

## 2023-02-16 NOTE — DISCHARGE INSTRUCTIONS
[facility] Physical And Pain Medicine  Post Procedure Discharge Instructions        YOU HAVE HAD THE FOLLOWING PROCEDURE:                                  [] Occipital Nerve Blocks  [] CTS wrist injection(s)  [] Knee Injection(s)         [x] Shoulder Injection(s)   [] Elbow Injection(s)     [] Botox Injection  [] Cervical Trigger Point Injections    [] Thoracic Trigger Point Injections    [] Lumbar Trigger Point Injections  [] Piriformis Trigger Point Injections  [] SI Joint Injection(s)     [] Trochanteric Bursa Injection(s)       [] Ankle Injection(s)   [] Plantar Fasciitis   []  ______________  Injection(s) [] Botox []  Migraines [] Spasticity    YOU HAVE RECEIVED THE FOLLOWING MEDICATIONS IN YOUR INJECTION(s)  [x] Lidocaine [x] Bupivacaine   [] DepoMedrol (steroid) [] Decadron (steroid)  []  Kenalog (steroid)   [x] Toradol  [] Supartz [] Raydell Reining    [] Botox        PATIENT INFORMATION:   You may experience the following symptoms after your procedure. These symptoms are normal and should not cause concern: You may have an increase in your pain. This may last 24 - 48 hours after your procedure. You may have no change in the pain that you had prior to your injection(s). You may have weakness or numbness in your affected extremity. If this occurs, this may last until numbing the medication wears off. REPORT THE FOLLOWING SYMPTOMS TO YOUR DOCTOR:  Redness, swelling or drainage at the injection site(s)  Unusual pain that interferes with your normal activities of daily living. OTHER INSTRUCTIONS:    [x] I will apply ice to the injection site(s) for at least 24 hours after the procedure. I will rotate the ice on for 20 minutes and off for 20 minutes for at least 24 hours. [x] I will not apply heat for at least 48 hours and I will not take a hot bath or shower for at least 24 hours.      [x] I understand that if Lidocaine or Bupivacaine was used in my injection(s) that the injection site(s) will be numb for a few hours after the procedure    [] I understand if a steroid was used it will take effect in 3 - 7 days. I understand that as the numbing medication wears off, the pain may return until the steroids start working. [x] I understand that today I will rest for the next 24 hours and drink plenty of water. [] For Botox for Migraines please do not wear anything constricting around the forehead for 7-10 days post injection. Examples headband, hats, or bandana    [] For Botox for Spasticity start therapy for the affected limb in two weeks. [] Additional instructions: ______________________________________________ ___________________________________________________________________    Sedation:  Was oral sedation given? [] Yes  [x] No    I understand that if I took an oral nerve calming medication I will not drive for  [] 24 hours after taking the medication.     [x] I have received a copy of my discharge instructions and understand the above instructions to the best of my knowledge    Patient Discharged:  [x] Home  [] Hospital  [] Other  ____________________________________________    Via:  [x] Ambulatory  [] Wheelchair   [] Stretcher [] EMS       Accompanied By:   [] Significant other  [] Family Member  [] Friend   [] Hospital Staff  []  Ambulance Staff  [x] Other_______________________________________________    Plan:  [] Will return to the office in   [] 1 month   [] 3 months for:  [] Procedure Follow-up  [x] Office Visit   [x] Planned Procedure      Patient Signature: _____________________________________________________    Staff Signature: _______________________________________________________        If you have questions, problems or concerns you may call [phone] and follow the prompts

## 2023-02-21 ENCOUNTER — TELEPHONE (OUTPATIENT)
Dept: PAIN MANAGEMENT | Age: 88
End: 2023-02-21

## 2023-03-09 DIAGNOSIS — G89.29 CHRONIC LEFT SHOULDER PAIN: ICD-10-CM

## 2023-03-09 DIAGNOSIS — M25.512 CHRONIC LEFT SHOULDER PAIN: ICD-10-CM

## 2023-03-09 DIAGNOSIS — M54.12 CERVICAL RADICULAR PAIN: ICD-10-CM

## 2023-03-09 RX ORDER — EMOLLIENT BASE
CREAM (GRAM) TOPICAL
Qty: 200 G | Refills: 5 | Status: SHIPPED | OUTPATIENT
Start: 2023-03-09

## 2023-03-09 RX ORDER — HYDROCODONE BITARTRATE AND ACETAMINOPHEN 7.5; 325 MG/1; MG/1
1 TABLET ORAL 2 TIMES DAILY PRN
Qty: 60 TABLET | Refills: 0 | Status: SHIPPED | OUTPATIENT
Start: 2023-03-13 | End: 2023-04-12

## 2023-03-20 DIAGNOSIS — M54.12 CERVICAL RADICULAR PAIN: ICD-10-CM

## 2023-03-20 RX ORDER — GABAPENTIN 100 MG/1
100 CAPSULE ORAL 2 TIMES DAILY
Qty: 180 CAPSULE | Refills: 0 | Status: SHIPPED | OUTPATIENT
Start: 2023-03-20 | End: 2023-06-18

## 2023-04-17 ENCOUNTER — HOSPITAL ENCOUNTER (OUTPATIENT)
Dept: PAIN MANAGEMENT | Age: 88
Discharge: HOME | End: 2023-04-17
Payer: MEDICARE

## 2023-04-17 VITALS
DIASTOLIC BLOOD PRESSURE: 77 MMHG | BODY MASS INDEX: 26.45 KG/M2 | HEIGHT: 72 IN | HEART RATE: 88 BPM | TEMPERATURE: 97.8 F | RESPIRATION RATE: 20 BRPM | OXYGEN SATURATION: 93 % | SYSTOLIC BLOOD PRESSURE: 117 MMHG

## 2023-04-17 DIAGNOSIS — M54.12 CERVICAL RADICULAR PAIN: ICD-10-CM

## 2023-04-17 PROCEDURE — 99213 OFFICE O/P EST LOW 20 MIN: CPT

## 2023-04-17 PROCEDURE — 99214 OFFICE O/P EST MOD 30 MIN: CPT

## 2023-04-17 PROCEDURE — 99213 OFFICE O/P EST LOW 20 MIN: CPT | Performed by: NURSE PRACTITIONER

## 2023-04-17 RX ORDER — HYDROCODONE BITARTRATE AND ACETAMINOPHEN 7.5; 325 MG/1; MG/1
1 TABLET ORAL 2 TIMES DAILY PRN
Qty: 60 TABLET | Refills: 0 | Status: SHIPPED | OUTPATIENT
Start: 2023-05-13 | End: 2023-06-12

## 2023-04-17 ASSESSMENT — PAIN DESCRIPTION - LOCATION: LOCATION: BACK;SHOULDER

## 2023-04-17 ASSESSMENT — PAIN DESCRIPTION - DESCRIPTORS: DESCRIPTORS: ACHING

## 2023-04-17 ASSESSMENT — PAIN DESCRIPTION - PAIN TYPE: TYPE: CHRONIC PAIN

## 2023-04-17 ASSESSMENT — PAIN SCALES - GENERAL: PAINLEVEL_OUTOF10: 9

## 2023-04-17 ASSESSMENT — PAIN DESCRIPTION - FREQUENCY: FREQUENCY: CONTINUOUS

## 2023-04-17 NOTE — PROGRESS NOTES
Clinic Documentation      Education Provided:  [x] Review of Prince Taylor  [] Agreement Review  [x] PEG Score Calculated [] PHQ Score Calculated [] ORT Score Calculated    [] Compliance Issues Discussed [] Cognitive Behavior Needs [x] Exercise [] Review of Test [] Financial Issues  [x] Tobacco/Alcohol Use Reviewed [x] Teaching [] New Patient [] Picture Obtained    Physician Plan:  [] Outgoing Referral  [] Pharmacy Consult  [x] Test Ordered [x] Prescription Ordered/Changed   [] Obtained Test Results / Consult Notes        Complete if patient is withholding blood thinner for procedure     Blood Thinner Patient is currently taking:      [] Plavix (Hold for 7 days)  [] Aspirin (Hold for 5 days)     [] Pletal (Hold for 2 days)  [] Pradaxa (Hold for 3 days)    [] Effient (Hold for 7 days)  [] Xarelto (Hold for 2 days)    [] Eliquis (Hold for 2 days)  [] Brilinta (Hold for 7 days)    [] Coumadin (Hold for 5 days) - (INR needs to be drawn the day prior to procedure- INR < 2.0)    [] Aggrenox (Hold for 7 days)        [] Patient will stop medication on their own.    [] Blood Thinner Form Faxed for approval to hold.    Provider form faxed to:     Assessment Completed by:  Electronically signed by Giovanni Edouard RN on 4/17/2023 at 12:34 PM
words or phrases may be inadvertently transcribed.  Although, I have reviewed the note for such errors, some may still exist.     CC:  Trenda Schirmer, APRN - REJI Land, 4/17/2023 at 1:05 PM

## 2023-05-08 DIAGNOSIS — G89.29 CHRONIC LEFT SHOULDER PAIN: ICD-10-CM

## 2023-05-08 DIAGNOSIS — M25.512 CHRONIC LEFT SHOULDER PAIN: ICD-10-CM

## 2023-05-08 DIAGNOSIS — M54.12 CERVICAL RADICULAR PAIN: ICD-10-CM

## 2023-05-08 RX ORDER — EMOLLIENT BASE
CREAM (GRAM) TOPICAL
Qty: 200 G | Refills: 5 | Status: SHIPPED | OUTPATIENT
Start: 2023-05-08

## 2023-05-09 DIAGNOSIS — M54.12 CERVICAL RADICULAR PAIN: ICD-10-CM

## 2023-05-09 RX ORDER — HYDROCODONE BITARTRATE AND ACETAMINOPHEN 7.5; 325 MG/1; MG/1
1 TABLET ORAL 2 TIMES DAILY PRN
Qty: 60 TABLET | Refills: 0 | Status: SHIPPED | OUTPATIENT
Start: 2023-05-13 | End: 2023-06-12

## 2023-05-11 ENCOUNTER — HOSPITAL ENCOUNTER (OUTPATIENT)
Dept: PAIN MANAGEMENT | Age: 88
Discharge: HOME | End: 2023-05-11
Payer: MEDICARE

## 2023-05-11 VITALS
HEART RATE: 81 BPM | SYSTOLIC BLOOD PRESSURE: 123 MMHG | OXYGEN SATURATION: 96 % | RESPIRATION RATE: 18 BRPM | DIASTOLIC BLOOD PRESSURE: 65 MMHG | TEMPERATURE: 97 F

## 2023-05-11 PROCEDURE — 20553 NJX 1/MLT TRIGGER POINTS 3/>: CPT

## 2023-05-11 PROCEDURE — 6360000002 HC RX W HCPCS

## 2023-05-11 PROCEDURE — 20553 NJX 1/MLT TRIGGER POINTS 3/>: CPT | Performed by: NURSE PRACTITIONER

## 2023-05-11 PROCEDURE — 2500000003 HC RX 250 WO HCPCS

## 2023-05-11 RX ORDER — LIDOCAINE HYDROCHLORIDE 10 MG/ML
15 INJECTION, SOLUTION EPIDURAL; INFILTRATION; INTRACAUDAL; PERINEURAL ONCE
Status: DISCONTINUED | OUTPATIENT
Start: 2023-05-11 | End: 2023-05-13 | Stop reason: HOSPADM

## 2023-05-11 RX ORDER — BUPIVACAINE HYDROCHLORIDE 5 MG/ML
15 INJECTION, SOLUTION EPIDURAL; INTRACAUDAL ONCE
Status: DISCONTINUED | OUTPATIENT
Start: 2023-05-11 | End: 2023-05-13 | Stop reason: HOSPADM

## 2023-05-11 NOTE — PROCEDURES
[facility] Physical and Pain Medicine      Patient Name: Addison Boothe    : 1935                    Age: 87 y.o.    Sex: male    Date: May 11, 2023    Pre-op Diagnosis: Myofascial Pain/ Muscle Spasms/ Cervicalgia    Post-op Diagnosis: Myofascial Pain/ Muscle Spasms/ Cervicalgia    Procedure: Cervical Trigger Point Injections    Performing Procedure: Yuli Nicholas, MT, APRN, FNP-C    Previously Had Procedure:  Yes []  No [x]     Patient Vitals for the past 24 hrs:   BP Temp Temp src Pulse Resp SpO2   23 0839 123/65 97 °F (36.1 °C) Temporal 81 18 96 %       Description of Procedure:     After a brief physical assessment and failure to improve with conservative measures the patient presented for Cervical Trigger Point Injections The indications, limitations and possible complications were discussed with the patient and the patient elected to proceed with the procedure.    After voluntary, informed and signed consent obtained the patient was placed in a seated position.     Appropriate time out was obtained per policy.     The area of maximal tenderness was palpated over the [] Right   []  Left   [x]  Bilateral Cervical   [x] Splenius   [x] Trapezius  [x] Rhomboid.  The skin overlying these areas was marked with a skin marker if needed.     The areas were then cleansed with Chloro-prep.    The skin overlying the proposed injection sites were then sprayed with Gebauer's Solution (if requested by patient) while protecting patient eyes.    Each trigger point of the  [] Right      [] Left     [x]   Bilateral Cervical  [x] Splenius  [x] Trapezius   [x] Rhomboid was then injected after negative aspiration using a 25 gauge 1 1/2 in needle with approximately 2 ml of a solution of     [x] 5 ml of 1% Lidocaine Plain and 5 ml of 0.5% Marcaine Plain per 10 ml syringe  [] Toradol 0.5 ml (30 mg/ml) per 10 ml syringe  [x] Kenalog 0.5 ml (40 mg/ml) per 10 ml syringe  [] Depo-medrol 0.5 ml (40

## 2023-05-11 NOTE — DISCHARGE INSTRUCTIONS
[facility] Physical And Pain Medicine  Post Procedure Discharge Instructions        YOU HAVE HAD THE FOLLOWING PROCEDURE:                                  [] Occipital Nerve Blocks  [] CTS wrist injection(s)  [] Knee Injection(s)         [] Shoulder Injection(s)   [] Elbow Injection(s)     [] Botox Injection  [x] Cervical Trigger Point Injections    [] Thoracic Trigger Point Injections    [x] Lumbar Trigger Point Injections  [] Piriformis Trigger Point Injections  [] SI Joint Injection(s)     [] Trochanteric Bursa Injection(s)       [] Ankle Injection(s)   [] Plantar Fasciitis   []  ______________  Injection(s) [] Botox []  Migraines [] Spasticity    YOU HAVE RECEIVED THE FOLLOWING MEDICATIONS IN YOUR INJECTION(s)  [x] Lidocaine [x] Bupivacaine   [] DepoMedrol (steroid) [] Decadron (steroid)  []  Kenalog (steroid)   [] Toradol  [] Supartz [] Alpine Grade    [] Botox        PATIENT INFORMATION:   You may experience the following symptoms after your procedure. These symptoms are normal and should not cause concern: You may have an increase in your pain. This may last 24 - 48 hours after your procedure. You may have no change in the pain that you had prior to your injection(s). You may have weakness or numbness in your affected extremity. If this occurs, this may last until numbing the medication wears off. REPORT THE FOLLOWING SYMPTOMS TO YOUR DOCTOR:  Redness, swelling or drainage at the injection site(s)  Unusual pain that interferes with your normal activities of daily living. OTHER INSTRUCTIONS:    [x] I will apply ice to the injection site(s) for at least 24 hours after the procedure. I will rotate the ice on for 20 minutes and off for 20 minutes for at least 24 hours. [x] I will not apply heat for at least 48 hours and I will not take a hot bath or shower for at least 24 hours.      [x] I understand that if Lidocaine or Bupivacaine was used in my injection(s) that the injection site(s)

## 2023-05-22 ENCOUNTER — TELEPHONE (OUTPATIENT)
Dept: PAIN MANAGEMENT | Age: 88
End: 2023-05-22

## 2023-06-21 DIAGNOSIS — M54.12 CERVICAL RADICULAR PAIN: ICD-10-CM

## 2023-06-21 RX ORDER — HYDROCODONE BITARTRATE AND ACETAMINOPHEN 7.5; 325 MG/1; MG/1
1 TABLET ORAL 2 TIMES DAILY PRN
Qty: 60 TABLET | Refills: 0 | Status: SHIPPED | OUTPATIENT
Start: 2023-07-13 | End: 2023-08-12

## 2023-06-21 RX ORDER — GABAPENTIN 100 MG/1
100 CAPSULE ORAL 2 TIMES DAILY
Qty: 180 CAPSULE | Refills: 0 | Status: SHIPPED | OUTPATIENT
Start: 2023-06-21 | End: 2023-09-19

## 2023-07-17 ENCOUNTER — HOSPITAL ENCOUNTER (OUTPATIENT)
Dept: PAIN MANAGEMENT | Age: 88
Discharge: HOME | End: 2023-07-17
Payer: MEDICARE

## 2023-07-17 VITALS
OXYGEN SATURATION: 93 % | TEMPERATURE: 97.8 F | RESPIRATION RATE: 18 BRPM | HEART RATE: 76 BPM | WEIGHT: 192.38 LBS | BODY MASS INDEX: 26.06 KG/M2 | HEIGHT: 72 IN

## 2023-07-17 DIAGNOSIS — M54.12 CERVICAL RADICULAR PAIN: ICD-10-CM

## 2023-07-17 PROCEDURE — 99213 OFFICE O/P EST LOW 20 MIN: CPT

## 2023-07-17 PROCEDURE — 99213 OFFICE O/P EST LOW 20 MIN: CPT | Performed by: NURSE PRACTITIONER

## 2023-07-17 RX ORDER — HYDROCODONE BITARTRATE AND ACETAMINOPHEN 7.5; 325 MG/1; MG/1
1 TABLET ORAL 2 TIMES DAILY PRN
Qty: 60 TABLET | Refills: 0 | Status: SHIPPED | OUTPATIENT
Start: 2023-08-12 | End: 2023-09-11

## 2023-07-17 ASSESSMENT — PAIN DESCRIPTION - FREQUENCY: FREQUENCY: CONTINUOUS

## 2023-07-17 ASSESSMENT — PAIN DESCRIPTION - PAIN TYPE: TYPE: CHRONIC PAIN

## 2023-07-17 ASSESSMENT — PAIN DESCRIPTION - LOCATION: LOCATION: NECK;BACK;SHOULDER

## 2023-07-17 ASSESSMENT — PAIN DESCRIPTION - DESCRIPTORS: DESCRIPTORS: ACHING

## 2023-07-17 ASSESSMENT — PAIN SCALES - GENERAL: PAINLEVEL_OUTOF10: 9

## 2023-07-17 ASSESSMENT — PAIN DESCRIPTION - ORIENTATION: ORIENTATION: LOWER

## 2023-07-17 NOTE — PROGRESS NOTES
Clinic Documentation      Education Provided:  [x] Review of Renetta Romero  [] Agreement Review  [x] PEG Score Calculated [] PHQ Score Calculated [] ORT Score Calculated    [] Compliance Issues Discussed [] Cognitive Behavior Needs [x] Exercise [] Review of Test [] Financial Issues  [x] Tobacco/Alcohol Use Reviewed [x] Teaching [] New Patient [] Picture Obtained    Physician Plan:  [] Outgoing Referral  [] Pharmacy Consult  [] Test Ordered [x] Prescription Ordered/Changed   [] Obtained Test Results / Consult Notes        Complete if patient is withholding blood thinner for procedure     Blood Thinner Patient is currently taking:      [] Plavix (Hold for 7 days)  [] Aspirin (Hold for 5 days)     [] Pletal (Hold for 2 days)  [] Pradaxa (Hold for 3 days)    [] Effient (Hold for 7 days)  [] Xarelto (Hold for 2 days)    [] Eliquis (Hold for 2 days)  [] Brilinta (Hold for 7 days)    [] Coumadin (Hold for 5 days) - (INR needs to be drawn the day prior to procedure- INR < 2.0)    [] Aggrenox (Hold for 7 days)        [] Patient will stop medication on their own.    [] Blood Thinner Form Faxed for approval to hold.    Provider form faxed to:     Assessment Completed by:  Electronically signed by Vanessa Barrientos RN on 7/17/2023 at 1:22 PM

## 2023-07-17 NOTE — PROGRESS NOTES
[facility] Physical and Pain Medicine    Office Progress Note    Patient Name: Ramsey Brown    MR #: [id number]    Account #: [de-identified]    : 1935    Age: 82 y.o. Sex: male    Date: 2023    PCP: REJI Yang NP         Referring Provider:    Chief Complaint:   Chief Complaint   Patient presents with    Neck Pain    Shoulder Pain    Lower Back Pain       History of Present Illness:    Ramsey Brown is a 82 y.o. male who presents to the office for follow-up of bilateral cervical trigger point injections and bilateral lumbar trigger point injections with Kenalog. He says that he obtained 80% relief or 6 weeks. He is wanting them repeated. He also is follow up of left shoulder injection. He had 80% relief for 5 weeks. He is wanting it repeated. He continues to take his Norco, Voltaren and Gabapentin. He does get some relief that allows him to complete ADL's. His daughter is with him today. Last pain management HPI note read    Employment: Retired []   Disabled  []   Works []    Does Not Work [x]     Previous Injury:  Yes  []   No [x]     Previous Surgery: Yes []   No [x]     Previous Physical Therapy In the last 6 months? Yes  []     No [x]   Did Physical Therapy make thepain better or worse? Better []   Worse []  Unchanged []    MRI in the last two years? Yes [x]  No []   Results reviewed with patient? Yes []   No []    CT Scan in the last two years? Yes  []   No [x]   Results reviewed with patient? Yes []   No []    X-ray in the last two years? Yes [x]   No  []  Results reviewed with patient? Yes  []  No []    Injections in the past?  Yes [x]   No []   Did the injections help relieve the pain? Yes [x]   No []     Do you have Depression? Yes  []    No [x]   Thinking of harming yourself or others?   Yes  []   No [x]     Past Medical Histoy  Past Medical History:   Diagnosis Date    Arthritis     Shoulder impingement     left       Surgery

## 2023-08-03 ENCOUNTER — HOSPITAL ENCOUNTER (OUTPATIENT)
Dept: PAIN MANAGEMENT | Age: 88
Discharge: HOME | End: 2023-08-03
Payer: MEDICARE

## 2023-08-03 VITALS
OXYGEN SATURATION: 96 % | SYSTOLIC BLOOD PRESSURE: 159 MMHG | DIASTOLIC BLOOD PRESSURE: 75 MMHG | HEART RATE: 72 BPM | TEMPERATURE: 96.1 F | RESPIRATION RATE: 20 BRPM

## 2023-08-03 PROCEDURE — 6360000002 HC RX W HCPCS

## 2023-08-03 PROCEDURE — 20610 DRAIN/INJ JOINT/BURSA W/O US: CPT

## 2023-08-03 PROCEDURE — 2500000003 HC RX 250 WO HCPCS

## 2023-08-03 RX ORDER — LIDOCAINE HYDROCHLORIDE 10 MG/ML
1 INJECTION, SOLUTION EPIDURAL; INFILTRATION; INTRACAUDAL; PERINEURAL ONCE
Status: DISCONTINUED | OUTPATIENT
Start: 2023-08-03 | End: 2023-08-05 | Stop reason: HOSPADM

## 2023-08-03 RX ORDER — BUPIVACAINE HYDROCHLORIDE 5 MG/ML
1 INJECTION, SOLUTION EPIDURAL; INTRACAUDAL ONCE
Status: DISCONTINUED | OUTPATIENT
Start: 2023-08-03 | End: 2023-08-05 | Stop reason: HOSPADM

## 2023-08-03 RX ORDER — TRIAMCINOLONE ACETONIDE 40 MG/ML
40 INJECTION, SUSPENSION INTRA-ARTICULAR; INTRAMUSCULAR ONCE
Status: DISCONTINUED | OUTPATIENT
Start: 2023-08-03 | End: 2023-08-05 | Stop reason: HOSPADM

## 2023-08-17 ENCOUNTER — HOSPITAL ENCOUNTER (OUTPATIENT)
Dept: PAIN MANAGEMENT | Age: 88
Discharge: HOME | End: 2023-08-17
Payer: MEDICARE

## 2023-08-17 VITALS
SYSTOLIC BLOOD PRESSURE: 144 MMHG | RESPIRATION RATE: 20 BRPM | DIASTOLIC BLOOD PRESSURE: 55 MMHG | TEMPERATURE: 96.7 F | OXYGEN SATURATION: 94 % | HEART RATE: 72 BPM

## 2023-08-17 PROCEDURE — 2500000003 HC RX 250 WO HCPCS

## 2023-08-17 PROCEDURE — 20553 NJX 1/MLT TRIGGER POINTS 3/>: CPT

## 2023-08-17 PROCEDURE — 6360000002 HC RX W HCPCS

## 2023-08-17 RX ORDER — LIDOCAINE HYDROCHLORIDE 10 MG/ML
15 INJECTION, SOLUTION EPIDURAL; INFILTRATION; INTRACAUDAL; PERINEURAL ONCE
Status: DISCONTINUED | OUTPATIENT
Start: 2023-08-17 | End: 2023-08-19 | Stop reason: HOSPADM

## 2023-08-17 RX ORDER — BUPIVACAINE HYDROCHLORIDE 5 MG/ML
15 INJECTION, SOLUTION EPIDURAL; INTRACAUDAL ONCE
Status: DISCONTINUED | OUTPATIENT
Start: 2023-08-17 | End: 2023-08-19 | Stop reason: HOSPADM

## 2023-08-17 RX ORDER — TRIAMCINOLONE ACETONIDE 40 MG/ML
60 INJECTION, SUSPENSION INTRA-ARTICULAR; INTRAMUSCULAR ONCE
Status: DISCONTINUED | OUTPATIENT
Start: 2023-08-17 | End: 2023-08-19 | Stop reason: HOSPADM

## 2023-08-17 NOTE — DISCHARGE INSTRUCTIONS
[facility] Physical And Pain Medicine  Post Procedure Discharge Instructions        YOU HAVE HAD THE FOLLOWING PROCEDURE:                                  [] Occipital Nerve Blocks  [] CTS wrist injection(s)  [] Knee Injection(s)         [] Shoulder Injection(s)   [] Elbow Injection(s)     [] Botox Injection  [x] Cervical Trigger Point Injections    [] Thoracic Trigger Point Injections    [x] Lumbar Trigger Point Injections  [] Piriformis Trigger Point Injections  [] SI Joint Injection(s)     [] Trochanteric Bursa Injection(s)       [] Ankle Injection(s)   [] Plantar Fasciitis   []  ______________  Injection(s) [] Botox []  Migraines [] Spasticity    YOU HAVE RECEIVED THE FOLLOWING MEDICATIONS IN YOUR INJECTION(s)  [x] Lidocaine [x] Bupivacaine   [] DepoMedrol (steroid) [] Decadron (steroid)  [x]  Kenalog (steroid)   [] Toradol  [] Supartz [] Cliff Tae    [] Botox        PATIENT INFORMATION:   You may experience the following symptoms after your procedure. These symptoms are normal and should not cause concern: You may have an increase in your pain. This may last 24 - 48 hours after your procedure. You may have no change in the pain that you had prior to your injection(s). You may have weakness or numbness in your affected extremity. If this occurs, this may last until numbing the medication wears off. REPORT THE FOLLOWING SYMPTOMS TO YOUR DOCTOR:  Redness, swelling or drainage at the injection site(s)  Unusual pain that interferes with your normal activities of daily living. OTHER INSTRUCTIONS:    [x] I will apply ice to the injection site(s) for at least 24 hours after the procedure. I will rotate the ice on for 20 minutes and off for 20 minutes for at least 24 hours. [x] I will not apply heat for at least 48 hours and I will not take a hot bath or shower for at least 24 hours.      [x] I understand that if Lidocaine or Bupivacaine was used in my injection(s) that the injection site(s)

## 2023-09-11 DIAGNOSIS — M54.12 CERVICAL RADICULAR PAIN: ICD-10-CM

## 2023-09-11 RX ORDER — HYDROCODONE BITARTRATE AND ACETAMINOPHEN 7.5; 325 MG/1; MG/1
1 TABLET ORAL 2 TIMES DAILY PRN
Qty: 60 TABLET | Refills: 0 | Status: SHIPPED | OUTPATIENT
Start: 2023-09-11 | End: 2023-10-11

## 2023-09-20 DIAGNOSIS — M54.12 CERVICAL RADICULAR PAIN: ICD-10-CM

## 2023-09-20 RX ORDER — GABAPENTIN 100 MG/1
100 CAPSULE ORAL 2 TIMES DAILY
Qty: 180 CAPSULE | Refills: 0 | Status: SHIPPED | OUTPATIENT
Start: 2023-09-20 | End: 2023-12-19

## 2023-10-09 DIAGNOSIS — M54.12 CERVICAL RADICULAR PAIN: ICD-10-CM

## 2023-10-09 RX ORDER — HYDROCODONE BITARTRATE AND ACETAMINOPHEN 7.5; 325 MG/1; MG/1
1 TABLET ORAL 2 TIMES DAILY PRN
Qty: 60 TABLET | Refills: 0 | Status: SHIPPED | OUTPATIENT
Start: 2023-10-11 | End: 2023-11-10

## 2023-11-13 DIAGNOSIS — M54.12 CERVICAL RADICULAR PAIN: ICD-10-CM

## 2023-11-13 RX ORDER — HYDROCODONE BITARTRATE AND ACETAMINOPHEN 7.5; 325 MG/1; MG/1
1 TABLET ORAL 2 TIMES DAILY PRN
Qty: 60 TABLET | Refills: 0 | Status: SHIPPED | OUTPATIENT
Start: 2023-11-13 | End: 2023-12-13

## 2023-12-12 DIAGNOSIS — M54.12 CERVICAL RADICULAR PAIN: ICD-10-CM

## 2023-12-13 RX ORDER — HYDROCODONE BITARTRATE AND ACETAMINOPHEN 7.5; 325 MG/1; MG/1
1 TABLET ORAL 2 TIMES DAILY PRN
Qty: 60 TABLET | Refills: 0 | Status: SHIPPED | OUTPATIENT
Start: 2023-12-14 | End: 2024-01-13

## 2024-01-24 ENCOUNTER — TELEPHONE (OUTPATIENT)
Dept: PAIN MANAGEMENT | Age: 89
End: 2024-01-24

## 2024-01-24 ENCOUNTER — HOSPITAL ENCOUNTER (OUTPATIENT)
Dept: PAIN MANAGEMENT | Age: 89
Discharge: HOME | End: 2024-01-24
Payer: MEDICARE

## 2024-01-24 VITALS
OXYGEN SATURATION: 94 % | DIASTOLIC BLOOD PRESSURE: 60 MMHG | TEMPERATURE: 96 F | HEART RATE: 80 BPM | RESPIRATION RATE: 18 BRPM | SYSTOLIC BLOOD PRESSURE: 89 MMHG

## 2024-01-24 PROCEDURE — 20611 DRAIN/INJ JOINT/BURSA W/US: CPT

## 2024-01-24 PROCEDURE — 20553 NJX 1/MLT TRIGGER POINTS 3/>: CPT | Performed by: NURSE PRACTITIONER

## 2024-01-24 PROCEDURE — 6360000002 HC RX W HCPCS

## 2024-01-24 PROCEDURE — 2500000003 HC RX 250 WO HCPCS

## 2024-01-24 PROCEDURE — 20553 NJX 1/MLT TRIGGER POINTS 3/>: CPT

## 2024-01-24 PROCEDURE — 20611 DRAIN/INJ JOINT/BURSA W/US: CPT | Performed by: NURSE PRACTITIONER

## 2024-01-24 RX ORDER — LIDOCAINE HYDROCHLORIDE 10 MG/ML
15 INJECTION, SOLUTION EPIDURAL; INFILTRATION; INTRACAUDAL; PERINEURAL ONCE
Status: DISCONTINUED | OUTPATIENT
Start: 2024-01-24 | End: 2024-01-26 | Stop reason: HOSPADM

## 2024-01-24 RX ORDER — TRIAMCINOLONE ACETONIDE 40 MG/ML
40 INJECTION, SUSPENSION INTRA-ARTICULAR; INTRAMUSCULAR ONCE
Status: DISCONTINUED | OUTPATIENT
Start: 2024-01-24 | End: 2024-01-26 | Stop reason: HOSPADM

## 2024-01-24 RX ORDER — BUPIVACAINE HYDROCHLORIDE 5 MG/ML
1 INJECTION, SOLUTION EPIDURAL; INTRACAUDAL ONCE
Status: DISCONTINUED | OUTPATIENT
Start: 2024-01-24 | End: 2024-01-26 | Stop reason: HOSPADM

## 2024-01-24 RX ORDER — LIDOCAINE HYDROCHLORIDE 10 MG/ML
1 INJECTION, SOLUTION EPIDURAL; INFILTRATION; INTRACAUDAL; PERINEURAL ONCE
Status: DISCONTINUED | OUTPATIENT
Start: 2024-01-24 | End: 2024-01-26 | Stop reason: HOSPADM

## 2024-01-24 RX ORDER — ETHYL CHLORIDE 100 %
AEROSOL, SPRAY (ML) TOPICAL PRN
Status: DISCONTINUED | OUTPATIENT
Start: 2024-01-24 | End: 2024-01-26 | Stop reason: HOSPADM

## 2024-01-24 RX ORDER — BUPIVACAINE HYDROCHLORIDE 5 MG/ML
15 INJECTION, SOLUTION EPIDURAL; INTRACAUDAL ONCE
Status: DISCONTINUED | OUTPATIENT
Start: 2024-01-24 | End: 2024-01-26 | Stop reason: HOSPADM

## 2024-01-24 NOTE — PROCEDURES
[facility] Physical & Pain Medicine    Patient Name: Addison Boothe    : 1935    Age: 88 y.o.    Sex: male    Date: 2024    Preop Diagnosis: Osteoarthritis of  left Shoulder(s)    Postop Diagnosis: Osteoarthritis of left Shoulder(s)    Procedure: Steroid Injection of left  Shoulder(s) with US confirmation     Performing Procedure:  MINERVA Elmore-BC, APRN    Patient Vitals for the past 24 hrs:   BP Temp Temp src Pulse Resp SpO2   24 1239 (!) 89/60 (!) 96 °F (35.6 °C) Temporal 80 18 94 %       Description of Procedure:  After a brief physical assessment and failure to improve with conservative measures the patient presented for an injection of the left Shoulder Joint(s). The indications, limitations and possible complications were discussed with the patient and the patient elected to proceed with the procedure.    left Joint    After voluntary, informed and signed consent obtained the patient was placed in a seated position. Appropriate time out was obtained per policy. The patient was placed in a seated position with affected arm placed to the side and elbow flexed at 90 degrees. The Shoulder joint(s) was palpated for maximal tenderness. The area was marked for Posterior approach. The ultrasound was  used to confirm correct location. The skin prepped in an aseptic fashion with CHG swab and then sprayed with Gebauer's Solution. Under aseptic technique 22 gauge 1 1/2 inch needle was then introduced into the shoulder(s). After a negative aspiration a solution of 1 ml of 0.5% Marcaine Plain, 1 ml of 1% Lidocaine Plain and 1 ml of Kenalog (40mg/ml) was injected into the Shoulder(s). The needle was withdrawn and a sterile dressing applied. If this was a bilateral procedure the same steps were followed on the opposite side.     Discharge:  The patient tolerated the procedure well. There were no complications during the procedure and the patient was discharged home with

## 2024-01-24 NOTE — TELEPHONE ENCOUNTER
At Patient's injection appointment the patient's daughter stated they have not been able to  the Flexeril 5mg because it required a PA.  I [place] one and [place] stating (drug/product is not covered under the pharmacy benefit, PA is not available.  I called the nurses upstairs to let the patient's daughter know, I gave them Good Rx pricing if they wanted to transfer it to another pharmacy that would take Good Rx.  THADDEUS Lamb was going to let the daughter know and was going to give her one of the Good Rx cards from the nurses station.

## 2024-01-24 NOTE — DISCHARGE INSTRUCTIONS
[facility] Physical And Pain Medicine  Post Procedure Discharge Instructions        YOU HAVE HAD THE FOLLOWING PROCEDURE:                                  [] Occipital Nerve Blocks  [] CTS wrist injection(s)  [] Knee Injection(s)         [x] Shoulder Injection(s)   [] Elbow Injection(s)     [] Botox Injection  [x] Cervical Trigger Point Injections    [] Thoracic Trigger Point Injections    [x] Lumbar Trigger Point Injections  [] Piriformis Trigger Point Injections  [] SI Joint Injection(s)     [] Trochanteric Bursa Injection(s)       [] Ankle Injection(s)   [] Plantar Fasciitis   []  ______________  Injection(s) [] Botox []  Migraines [] Spasticity    YOU HAVE RECEIVED THE FOLLOWING MEDICATIONS IN YOUR INJECTION(s)  [x] Lidocaine [x] Bupivacaine   [] DepoMedrol (steroid) [] Decadron (steroid)  [x]  Kenalog (steroid)   [] Toradol  [] Supartz [] Zilretta    [] Botox        PATIENT INFORMATION:   You may experience the following symptoms after your procedure. These symptoms are normal and should not cause concern:    You may have an increase in your pain. This may last 24 - 48 hours after your procedure.  You may have no change in the pain that you had prior to your injection(s).  You may have weakness or numbness in your affected extremity. If this occurs, this may last until numbing the medication wears off.     REPORT THE FOLLOWING SYMPTOMS TO YOUR DOCTOR:  Redness, swelling or drainage at the injection site(s)  Unusual pain that interferes with your normal activities of daily living.    OTHER INSTRUCTIONS:    [x] I will apply ice to the injection site(s) for at least 24 hours after the procedure. I will rotate the ice on for 20 minutes and off for 20 minutes for at least 24 hours.    [x] I will not apply heat for at least 48 hours and I will not take a hot bath or shower for at least 24 hours.     [x] I understand that if Lidocaine or Bupivacaine was used in my injection(s) that the injection

## 2024-02-07 DIAGNOSIS — M54.12 CERVICAL RADICULAR PAIN: ICD-10-CM

## 2024-02-07 RX ORDER — HYDROCODONE BITARTRATE AND ACETAMINOPHEN 7.5; 325 MG/1; MG/1
1 TABLET ORAL 2 TIMES DAILY PRN
Qty: 60 TABLET | Refills: 0 | Status: SHIPPED | OUTPATIENT
Start: 2024-02-12 | End: 2024-03-13

## 2024-02-07 RX ORDER — GABAPENTIN 100 MG/1
100 CAPSULE ORAL 2 TIMES DAILY
Qty: 180 CAPSULE | Refills: 0 | Status: SHIPPED | OUTPATIENT
Start: 2024-03-16 | End: 2024-06-14

## 2024-03-11 DIAGNOSIS — M54.12 CERVICAL RADICULAR PAIN: ICD-10-CM

## 2024-03-11 DIAGNOSIS — G89.29 CHRONIC LEFT SHOULDER PAIN: ICD-10-CM

## 2024-03-11 DIAGNOSIS — M25.512 CHRONIC LEFT SHOULDER PAIN: ICD-10-CM

## 2024-03-11 RX ORDER — HYDROCODONE BITARTRATE AND ACETAMINOPHEN 7.5; 325 MG/1; MG/1
1 TABLET ORAL 2 TIMES DAILY PRN
Qty: 60 TABLET | Refills: 0 | Status: SHIPPED | OUTPATIENT
Start: 2024-03-13 | End: 2024-04-12

## 2024-04-08 DIAGNOSIS — M54.12 CERVICAL RADICULAR PAIN: ICD-10-CM

## 2024-04-08 RX ORDER — HYDROCODONE BITARTRATE AND ACETAMINOPHEN 7.5; 325 MG/1; MG/1
1 TABLET ORAL 2 TIMES DAILY PRN
Qty: 60 TABLET | Refills: 0 | Status: SHIPPED | OUTPATIENT
Start: 2024-04-12 | End: 2024-05-12

## 2024-04-25 ENCOUNTER — HOSPITAL ENCOUNTER (OUTPATIENT)
Dept: PAIN MANAGEMENT | Age: 89
Discharge: HOME | End: 2024-04-25
Payer: MEDICARE

## 2024-04-25 VITALS
RESPIRATION RATE: 16 BRPM | DIASTOLIC BLOOD PRESSURE: 59 MMHG | OXYGEN SATURATION: 92 % | BODY MASS INDEX: 25.23 KG/M2 | SYSTOLIC BLOOD PRESSURE: 117 MMHG | HEART RATE: 67 BPM | WEIGHT: 186 LBS | TEMPERATURE: 97.7 F

## 2024-04-25 DIAGNOSIS — M79.18 MYOFASCIAL MUSCLE PAIN: ICD-10-CM

## 2024-04-25 DIAGNOSIS — G89.29 CHRONIC LEFT SHOULDER PAIN: ICD-10-CM

## 2024-04-25 DIAGNOSIS — M54.2 CERVICALGIA: Primary | ICD-10-CM

## 2024-04-25 DIAGNOSIS — M25.512 CHRONIC LEFT SHOULDER PAIN: ICD-10-CM

## 2024-04-25 DIAGNOSIS — Z51.81 ENCOUNTER FOR MONITORING OPIOID MAINTENANCE THERAPY: ICD-10-CM

## 2024-04-25 DIAGNOSIS — M54.12 CERVICAL RADICULAR PAIN: ICD-10-CM

## 2024-04-25 DIAGNOSIS — Z79.891 ENCOUNTER FOR MONITORING OPIOID MAINTENANCE THERAPY: ICD-10-CM

## 2024-04-25 PROCEDURE — 99214 OFFICE O/P EST MOD 30 MIN: CPT

## 2024-04-25 RX ORDER — HYDROCODONE BITARTRATE AND ACETAMINOPHEN 7.5; 325 MG/1; MG/1
1 TABLET ORAL 2 TIMES DAILY PRN
Qty: 60 TABLET | Refills: 0 | Status: SHIPPED | OUTPATIENT
Start: 2024-05-12 | End: 2024-06-11

## 2024-04-25 RX ORDER — GABAPENTIN 100 MG/1
100 CAPSULE ORAL 2 TIMES DAILY
Qty: 60 CAPSULE | Refills: 2 | Status: SHIPPED | OUTPATIENT
Start: 2024-04-25 | End: 2024-07-24

## 2024-04-25 ASSESSMENT — ENCOUNTER SYMPTOMS
EYES NEGATIVE: 1
GASTROINTESTINAL NEGATIVE: 1
BOWEL INCONTINENCE: 0
BACK PAIN: 1
RESPIRATORY NEGATIVE: 1

## 2024-04-25 ASSESSMENT — PAIN DESCRIPTION - LOCATION: LOCATION: BACK;NECK;SHOULDER

## 2024-04-25 ASSESSMENT — PAIN SCALES - GENERAL: PAINLEVEL_OUTOF10: 8

## 2024-04-25 NOTE — PROGRESS NOTES
Clinic Documentation      Education Provided:  [x] Review of Fritz  [] Agreement Review  [x] PEG Score Calculated [] PHQ Score Calculated [] ORT Score Calculated    [] Compliance Issues Discussed [] Cognitive Behavior Needs [x] Exercise [] Review of Test [] Financial Issues  [x] Tobacco/Alcohol Use Reviewed [x] Teaching [] New Patient [] Picture Obtained    Physician Plan:  [] Outgoing Referral  [] Pharmacy Consult  [x] Test Ordered [x] Prescription Ordered/Changed   [] Obtained Test Results / Consult Notes        Complete if patient is withholding blood thinner for procedure     Blood Thinner Patient is currently taking:      [] Plavix (Hold for 7 days)  [] Aspirin (Hold for 5 days)     [] Pletal (Hold for 2 days)  [] Pradaxa (Hold for 3 days)    [] Effient (Hold for 7 days)  [] Xarelto (Hold for 2 days)    [] Eliquis (Hold for 2 days)  [] Brilinta (Hold for 7 days)    [] Coumadin (Hold for 5 days) - (INR needs to be drawn the day prior to procedure- INR < 2.0)    [] Aggrenox (Hold for 7 days)        [] Patient will stop medication on their own.    [] Blood Thinner Form Faxed for approval to hold.   Provider form faxed to:     Assessment Completed by:  Electronically signed by Sanaz Amezquita RN on 4/25/2024 at 10:43 AM  
 General: He is not in acute distress.     Appearance: Normal appearance.   HENT:      Head: Normocephalic.      Right Ear: External ear normal.      Left Ear: External ear normal.      Nose: Nose normal.      Mouth/Throat:      Mouth: Mucous membranes are moist.      Pharynx: Oropharynx is clear.   Eyes:      Conjunctiva/sclera: Conjunctivae normal.      Pupils: Pupils are equal, round, and reactive to light.   Cardiovascular:      Rate and Rhythm: Normal rate.      Pulses: Normal pulses.   Pulmonary:      Effort: Pulmonary effort is normal. No respiratory distress.   Abdominal:      General: Bowel sounds are normal.      Palpations: Abdomen is soft.   Musculoskeletal:         General: Tenderness present.      Right shoulder: Decreased range of motion.      Left shoulder: Tenderness and bony tenderness present. Decreased range of motion.      Cervical back: Spasms (Tender palpable knots identified - trigger points), tenderness and bony tenderness present. Pain with movement present. Decreased range of motion.      Thoracic back: Spasms and tenderness present.      Lumbar back: Spasms (Tender palpable knots identified - trigger points) and tenderness present. Decreased range of motion.      Comments: positive Neer Test left  positive Aranda Test left        Skin:     General: Skin is warm and dry.   Neurological:      General: No focal deficit present.      Mental Status: He is alert and oriented to person, place, and time.      Cranial Nerves: No cranial nerve deficit.   Psychiatric:         Mood and Affect: Mood normal.         Behavior: Behavior normal.         Thought Content: Thought content normal.         Judgment: Judgment normal.           LABS:     No results found for: \"NA\", \"K\", \"CL\", \"CO2\", \"BUN\", \"CREATININE\", \"GLUCOSE\", \"CALCIUM\"     No results found for: \"WBC\", \"HGB\", \"HCT\", \"MCV\", \"PLT\"    Last Drug Screen: 4/25/2024 awaiting results    Assessment:

## 2024-04-25 NOTE — TELEPHONE ENCOUNTER
1. Cervical radicular pain    2. Chronic left shoulder pain      Requested Prescriptions     Pending Prescriptions Disp Refills    HYDROcodone-acetaminophen (NORCO) 7.5-325 MG per tablet 60 tablet 0     Sig: Take 1 tablet by mouth 2 times daily as needed for Pain for up to 30 days. May fill 5/12/24    diclofenac (VOLTAREN) 50 MG EC tablet 180 tablet 0     Sig: Take 1 tablet by mouth 2 times daily (with meals)    gabapentin (NEURONTIN) 100 MG capsule 60 capsule 2     Sig: Take 1 capsule by mouth 2 times daily for 90 days.       Continue medication with refill sent at appointment yes; refill sent to medical director at appointment no, see refill encounter dated 4/25/2024    Electronically signed by REJI Isabel CNP on 4/25/2024 at 11:05 AM

## 2024-06-13 ENCOUNTER — HOSPITAL ENCOUNTER (OUTPATIENT)
Dept: PAIN MANAGEMENT | Age: 89
Discharge: HOME | End: 2024-06-13
Payer: MEDICARE

## 2024-06-13 VITALS
DIASTOLIC BLOOD PRESSURE: 126 MMHG | RESPIRATION RATE: 18 BRPM | SYSTOLIC BLOOD PRESSURE: 139 MMHG | OXYGEN SATURATION: 91 % | HEART RATE: 80 BPM | TEMPERATURE: 96.8 F

## 2024-06-13 DIAGNOSIS — M62.838 MUSCLE SPASMS OF NECK: ICD-10-CM

## 2024-06-13 DIAGNOSIS — M25.512 CHRONIC LEFT SHOULDER PAIN: Primary | ICD-10-CM

## 2024-06-13 DIAGNOSIS — G89.29 CHRONIC LEFT SHOULDER PAIN: Primary | ICD-10-CM

## 2024-06-13 DIAGNOSIS — M54.12 CERVICAL RADICULAR PAIN: ICD-10-CM

## 2024-06-13 DIAGNOSIS — M62.830 MUSCLE SPASM OF BACK: ICD-10-CM

## 2024-06-13 PROCEDURE — 2500000003 HC RX 250 WO HCPCS

## 2024-06-13 PROCEDURE — 20553 NJX 1/MLT TRIGGER POINTS 3/>: CPT

## 2024-06-13 PROCEDURE — 6360000002 HC RX W HCPCS

## 2024-06-13 PROCEDURE — 20611 DRAIN/INJ JOINT/BURSA W/US: CPT

## 2024-06-13 RX ORDER — HYDROCODONE BITARTRATE AND ACETAMINOPHEN 7.5; 325 MG/1; MG/1
1 TABLET ORAL 2 TIMES DAILY PRN
Qty: 60 TABLET | Refills: 0 | Status: SHIPPED | OUTPATIENT
Start: 2024-06-13 | End: 2024-07-13

## 2024-06-13 RX ORDER — BUPIVACAINE HYDROCHLORIDE 5 MG/ML
1 INJECTION, SOLUTION EPIDURAL; INTRACAUDAL ONCE
Status: DISCONTINUED | OUTPATIENT
Start: 2024-06-13 | End: 2024-06-15 | Stop reason: HOSPADM

## 2024-06-13 RX ORDER — TRIAMCINOLONE ACETONIDE 40 MG/ML
40 INJECTION, SUSPENSION INTRA-ARTICULAR; INTRAMUSCULAR ONCE
Status: DISCONTINUED | OUTPATIENT
Start: 2024-06-13 | End: 2024-06-15 | Stop reason: HOSPADM

## 2024-06-13 RX ORDER — LIDOCAINE HYDROCHLORIDE 10 MG/ML
15 INJECTION, SOLUTION EPIDURAL; INFILTRATION; INTRACAUDAL; PERINEURAL ONCE
Status: DISCONTINUED | OUTPATIENT
Start: 2024-06-13 | End: 2024-06-15 | Stop reason: HOSPADM

## 2024-06-13 RX ORDER — ETHYL CHLORIDE 100 %
AEROSOL, SPRAY (ML) TOPICAL PRN
Status: DISCONTINUED | OUTPATIENT
Start: 2024-06-13 | End: 2024-06-15 | Stop reason: HOSPADM

## 2024-06-13 RX ORDER — BUPIVACAINE HYDROCHLORIDE 5 MG/ML
15 INJECTION, SOLUTION EPIDURAL; INTRACAUDAL ONCE
Status: DISCONTINUED | OUTPATIENT
Start: 2024-06-13 | End: 2024-06-15 | Stop reason: HOSPADM

## 2024-06-13 RX ORDER — LIDOCAINE HYDROCHLORIDE 10 MG/ML
1 INJECTION, SOLUTION EPIDURAL; INFILTRATION; INTRACAUDAL; PERINEURAL ONCE
Status: DISCONTINUED | OUTPATIENT
Start: 2024-06-13 | End: 2024-06-15 | Stop reason: HOSPADM

## 2024-06-13 NOTE — DISCHARGE INSTRUCTIONS
[facility] Physical And Pain Medicine  Post Procedure Discharge Instructions        YOU HAVE HAD THE FOLLOWING PROCEDURE:                                  [] Occipital Nerve Blocks  [] CTS wrist injection(s)  [] Knee Injection(s)         [x] Shoulder Injection(s)   [] Elbow Injection(s)     [] Botox Injection  [x] Cervical Trigger Point Injections    [] Thoracic Trigger Point Injections    [x] Lumbar Trigger Point Injections  [] Piriformis Trigger Point Injections  [] SI Joint Injection(s)     [] Trochanteric Bursa Injection(s)       [] Ankle Injection(s)   [] Plantar Fasciitis   []  ______________  Injection(s) [] Botox []  Migraines [] Spasticity    YOU HAVE RECEIVED THE FOLLOWING MEDICATIONS IN YOUR INJECTION(s)  [] Lidocaine [] Bupivacaine   [] DepoMedrol (steroid) [] Decadron (steroid)  []  Kenalog (steroid)   [] Toradol  [] Supartz [] Zilretta    [] Botox        PATIENT INFORMATION:   You may experience the following symptoms after your procedure. These symptoms are normal and should not cause concern:    You may have an increase in your pain. This may last 24 - 48 hours after your procedure.  You may have no change in the pain that you had prior to your injection(s).  You may have weakness or numbness in your affected extremity. If this occurs, this may last until numbing the medication wears off.     REPORT THE FOLLOWING SYMPTOMS TO YOUR DOCTOR:  Redness, swelling or drainage at the injection site(s)  Unusual pain that interferes with your normal activities of daily living.    OTHER INSTRUCTIONS:    [x] I will apply ice to the injection site(s) for at least 24 hours after the procedure. I will rotate the ice on for 20 minutes and off for 20 minutes for at least 24 hours.    [x] I will not apply heat for at least 48 hours and I will not take a hot bath or shower for at least 24 hours.     [x] I understand that if Lidocaine or Bupivacaine was used in my injection(s) that the injection site(s)

## 2024-06-13 NOTE — PROCEDURES
[facility] Physical & Pain Medicine    Patient Name: Addison Boothe    : 1935    Age: 88 y.o.    Sex: male    Date: 2024    Preop Diagnosis: Osteoarthritis of  left Shoulder(s)    Postop Diagnosis: Osteoarthritis of left Shoulder(s)    Procedure: Steroid Injection of left  Shoulder(s) with US confirmation     Performing Procedure:  Ning MENDOZA    Patient Vitals for the past 24 hrs:   BP Temp Temp src Pulse Resp SpO2   24 1314 (!) 139/126 96.8 °F (36 °C) Temporal 80 18 91 %       Description of Procedure:  After a brief physical assessment and failure to improve with conservative measures the patient presented for an injection of the left Shoulder Joint(s). The indications, limitations and possible complications were discussed with the patient and the patient elected to proceed with the procedure.    left Joint    After voluntary, informed and signed consent obtained the patient was placed in a seated position. Appropriate time out was obtained per policy. The patient was placed in a seated position with affected arm placed to the side and elbow flexed at 90 degrees. The Shoulder joint(s) was palpated for maximal tenderness. The area was marked for posterior approach. The ultrasound was  used to confirm correct location. The skin prepped in an aseptic fashion with CHG swab and then sprayed with Gebauer's Solution. Under aseptic technique 22 gauge 1 1/2 inch needle was then introduced into the shoulder(s). After a negative aspiration a solution of 1 ml of 0.5% Marcaine Plain, 1 ml of 1% Lidocaine Plain and 1 ml of Kenalog (40mg/ml) was injected into the Shoulder(s). The needle was withdrawn and a sterile dressing applied. If this was a bilateral procedure the same steps were followed on the opposite side.     [facility] Physical & Pain Medicine    Patient Name: Addison Boothe    : 1935    Age: 88 y.o.    Sex: male    Date: 2024    Pre-op

## 2024-06-13 NOTE — PROGRESS NOTES
[]Denies need for Education  Risk for Injury:  []Patient oriented to person, place and time  []History of frequent falls/loss of balance  Nutritional:  []Change in appetite   []Weight Gain   []Weight Loss  Functional:  []Requires assistance with ADL's

## 2024-07-08 ENCOUNTER — TELEPHONE (OUTPATIENT)
Dept: PAIN MANAGEMENT | Age: 89
End: 2024-07-08

## 2024-07-08 DIAGNOSIS — M54.12 CERVICAL RADICULAR PAIN: ICD-10-CM

## 2024-07-08 RX ORDER — HYDROCODONE BITARTRATE AND ACETAMINOPHEN 7.5; 325 MG/1; MG/1
1 TABLET ORAL 2 TIMES DAILY PRN
Qty: 60 TABLET | Refills: 0 | Status: SHIPPED | OUTPATIENT
Start: 2024-07-13 | End: 2024-08-12

## 2024-07-08 NOTE — TELEPHONE ENCOUNTER
Call left on prescription line, forwarded to nurse line.  Daughter is calling medication refill gabapentin and is asking for increase.  Patient takes 100 mg bid, and would like either stronger dose or three times daily.  Messaging provider, as patient last received gabapentin refill in February, 90 day supply.  Patient did not call for refill in May and received a refill in June that has not been filled.  Attempted to call daughter to clarify but no answer.  Voicemail message instructs do not leave message as she is unable to retrieve or return the call.  Forwarding to provider at this time.

## 2024-08-08 DIAGNOSIS — G89.29 CHRONIC LEFT SHOULDER PAIN: ICD-10-CM

## 2024-08-08 DIAGNOSIS — M25.512 CHRONIC LEFT SHOULDER PAIN: ICD-10-CM

## 2024-08-08 DIAGNOSIS — M54.12 CERVICAL RADICULAR PAIN: ICD-10-CM

## 2024-08-08 RX ORDER — HYDROCODONE BITARTRATE AND ACETAMINOPHEN 7.5; 325 MG/1; MG/1
1 TABLET ORAL 2 TIMES DAILY PRN
Qty: 60 TABLET | Refills: 0 | Status: SHIPPED | OUTPATIENT
Start: 2024-08-12 | End: 2024-09-11

## 2024-08-08 RX ORDER — GABAPENTIN 100 MG/1
100 CAPSULE ORAL 2 TIMES DAILY
Qty: 180 CAPSULE | Refills: 0 | Status: SHIPPED | OUTPATIENT
Start: 2024-08-08 | End: 2024-11-06

## 2024-08-13 ENCOUNTER — HOSPITAL ENCOUNTER (OUTPATIENT)
Dept: PAIN MANAGEMENT | Age: 89
Discharge: HOME | End: 2024-08-13
Payer: MEDICARE

## 2024-08-13 VITALS
RESPIRATION RATE: 16 BRPM | BODY MASS INDEX: 25.06 KG/M2 | SYSTOLIC BLOOD PRESSURE: 150 MMHG | DIASTOLIC BLOOD PRESSURE: 69 MMHG | HEART RATE: 69 BPM | WEIGHT: 185 LBS | OXYGEN SATURATION: 91 % | TEMPERATURE: 97.2 F | HEIGHT: 72 IN

## 2024-08-13 DIAGNOSIS — M54.9 CHRONIC NECK AND BACK PAIN: Primary | ICD-10-CM

## 2024-08-13 DIAGNOSIS — G89.29 CHRONIC NECK AND BACK PAIN: Primary | ICD-10-CM

## 2024-08-13 DIAGNOSIS — G89.29 CHRONIC LEFT SHOULDER PAIN: ICD-10-CM

## 2024-08-13 DIAGNOSIS — M54.2 CHRONIC NECK AND BACK PAIN: Primary | ICD-10-CM

## 2024-08-13 DIAGNOSIS — M25.512 CHRONIC LEFT SHOULDER PAIN: ICD-10-CM

## 2024-08-13 DIAGNOSIS — R52 PAIN MANAGEMENT: ICD-10-CM

## 2024-08-13 DIAGNOSIS — M79.18 MYOFASCIAL MUSCLE PAIN: ICD-10-CM

## 2024-08-13 DIAGNOSIS — M54.12 CERVICAL RADICULAR PAIN: ICD-10-CM

## 2024-08-13 PROCEDURE — 99214 OFFICE O/P EST MOD 30 MIN: CPT

## 2024-08-13 PROCEDURE — 99215 OFFICE O/P EST HI 40 MIN: CPT

## 2024-08-13 RX ORDER — HYDROCODONE BITARTRATE AND ACETAMINOPHEN 7.5; 325 MG/1; MG/1
1 TABLET ORAL 2 TIMES DAILY PRN
Qty: 60 TABLET | Refills: 0 | Status: SHIPPED | OUTPATIENT
Start: 2024-09-11 | End: 2024-10-11

## 2024-08-13 RX ORDER — GABAPENTIN 100 MG/1
100 CAPSULE ORAL 2 TIMES DAILY
Qty: 180 CAPSULE | Refills: 0 | Status: SHIPPED | OUTPATIENT
Start: 2024-11-11 | End: 2025-02-09

## 2024-08-13 ASSESSMENT — ENCOUNTER SYMPTOMS
EYES NEGATIVE: 1
BOWEL INCONTINENCE: 0
GASTROINTESTINAL NEGATIVE: 1
BACK PAIN: 1
RESPIRATORY NEGATIVE: 1

## 2024-08-13 ASSESSMENT — PAIN DESCRIPTION - LOCATION
LOCATION: BACK
LOCATION_2: NECK
LOCATION_3: SHOULDER

## 2024-08-13 ASSESSMENT — PAIN DESCRIPTION - ORIENTATION
ORIENTATION: LOWER
ORIENTATION_3: LEFT
ORIENTATION_2: LOWER

## 2024-08-13 ASSESSMENT — PAIN DESCRIPTION - INTENSITY
RATING_2: 6
RATING_3: 5

## 2024-08-13 ASSESSMENT — PAIN SCALES - GENERAL: PAINLEVEL_OUTOF10: 8

## 2024-08-13 ASSESSMENT — PAIN DESCRIPTION - PAIN TYPE: TYPE: CHRONIC PAIN

## 2024-08-13 NOTE — TELEPHONE ENCOUNTER
1. Cervical radicular pain    2. Chronic left shoulder pain      Requested Prescriptions     Pending Prescriptions Disp Refills    HYDROcodone-acetaminophen (NORCO) 7.5-325 MG per tablet 60 tablet 0     Sig: Take 1 tablet by mouth 2 times daily as needed for Pain for up to 30 days. May fill 09/11/24    gabapentin (NEURONTIN) 100 MG capsule 180 capsule 0     Sig: Take 1 capsule by mouth 2 times daily for 90 days. May fill 11/11/2024    diclofenac (VOLTAREN) 50 MG EC tablet 180 tablet 0     Sig: Take 1 tablet by mouth 2 times daily (with meals)       Continue medication with refill sent at appointment yes; refill sent to medical director at appointment no, see refill encounter dated 8/13/2024    Electronically signed by REJI Isabel CNP on 8/13/2024 at 11:19 AM

## 2024-08-13 NOTE — PROGRESS NOTES
Clinic Documentation      Education Provided:  [x] Review of Fritz  [x] Agreement Review  [x] PEG Score Calculated [x] PHQ Score Calculated [x] ORT Score Calculated    [] Compliance Issues Discussed [] Cognitive Behavior Needs [x] Exercise [] Review of Test [] Financial Issues  [x] Tobacco/Alcohol Use Reviewed [x] Teaching [] New Patient [] Picture Obtained    Physician Plan:  [] Outgoing Referral  [] Pharmacy Consult  [] Test Ordered [x] Prescription Ordered/Changed   [] Obtained Test Results / Consult Notes        Complete if patient is withholding blood thinner for procedure     Blood Thinner Patient is currently taking:      [] Plavix (Hold for 7 days)  [] Aspirin (Hold for 5 days)     [] Pletal (Hold for 2 days)  [] Pradaxa (Hold for 3 days)    [] Effient (Hold for 7 days)  [] Xarelto (Hold for 2 days)    [] Eliquis (Hold for 2 days)  [] Brilinta (Hold for 7 days)    [] Coumadin (Hold for 5 days) - (INR needs to be drawn the day prior to procedure- INR < 2.0)    [] Aggrenox (Hold for 7 days)        [] Patient will stop medication on their own.    [] Blood Thinner Form Faxed for approval to hold.   Provider form faxed to:     Assessment Completed by:  Electronically signed by Mary Guerra RN on 8/13/2024 at 11:26 AM

## 2024-08-13 NOTE — H&P
pain is present in the midline. The quality of the pain is described as aching and cramping. The pain is at a severity of 8/10. The pain is moderate. The symptoms are aggravated by bending, position, stress and twisting. The pain is Same all the time. Stiffness is present In the morning. Associated symptoms include headaches and weakness. Pertinent negatives include no numbness. He has tried acetaminophen, heat, ice, home exercises, oral narcotics and NSAIDs for the symptoms. The treatment provided mild relief.   Back Pain  This is a chronic problem. The current episode started more than 1 year ago. The problem occurs constantly. The problem has been waxing and waning since onset. The pain is present in the lumbar spine and sacro-iliac. The quality of the pain is described as aching and cramping. The pain is at a severity of 8/10. The pain is moderate. The pain is The same all the time. The symptoms are aggravated by bending, position, standing and twisting. Stiffness is present In the morning. Associated symptoms include headaches and weakness. Pertinent negatives include no bladder incontinence, bowel incontinence or numbness. He has tried heat, home exercises, ice and NSAIDs for the symptoms. The treatment provided mild relief.   Shoulder Pain  This is a chronic problem. The current episode started more than 1 year ago. The problem occurs constantly. The problem has been waxing and waning. Associated symptoms include arthralgias, headaches, myalgias, neck pain and weakness. Pertinent negatives include no numbness. The symptoms are aggravated by exertion, bending and twisting. He has tried oral narcotics, NSAIDs, heat and ice for the symptoms. The treatment provided mild relief.       Does pain affect ADLs Yes housework and walking  Does pain affect quality of life? Yes  Does pain affect activities of enjoyment? Yes  Have you been on pain medication for your pain? Yes  If so, does the pain medication keep your

## 2024-09-19 ENCOUNTER — HOSPITAL ENCOUNTER (OUTPATIENT)
Dept: PAIN MANAGEMENT | Age: 89
Discharge: HOME | End: 2024-09-19
Payer: MEDICARE

## 2024-09-19 VITALS
HEART RATE: 69 BPM | RESPIRATION RATE: 18 BRPM | SYSTOLIC BLOOD PRESSURE: 137 MMHG | OXYGEN SATURATION: 95 % | DIASTOLIC BLOOD PRESSURE: 68 MMHG | TEMPERATURE: 97.2 F

## 2024-09-19 DIAGNOSIS — M25.512 CHRONIC LEFT SHOULDER PAIN: ICD-10-CM

## 2024-09-19 DIAGNOSIS — G89.29 CHRONIC LEFT SHOULDER PAIN: ICD-10-CM

## 2024-09-19 DIAGNOSIS — M62.838 MUSCLE SPASMS OF NECK: Primary | ICD-10-CM

## 2024-09-19 DIAGNOSIS — M62.830 MUSCLE SPASM OF BACK: ICD-10-CM

## 2024-09-19 PROCEDURE — 20553 NJX 1/MLT TRIGGER POINTS 3/>: CPT

## 2024-09-19 PROCEDURE — 20611 DRAIN/INJ JOINT/BURSA W/US: CPT

## 2024-09-19 PROCEDURE — 2500000003 HC RX 250 WO HCPCS

## 2024-09-19 PROCEDURE — 6360000002 HC RX W HCPCS

## 2024-09-19 RX ORDER — LIDOCAINE HYDROCHLORIDE 10 MG/ML
15 INJECTION, SOLUTION EPIDURAL; INFILTRATION; INTRACAUDAL; PERINEURAL ONCE
Status: DISCONTINUED | OUTPATIENT
Start: 2024-09-19 | End: 2024-09-21 | Stop reason: HOSPADM

## 2024-09-19 RX ORDER — LIDOCAINE HYDROCHLORIDE 10 MG/ML
1 INJECTION, SOLUTION EPIDURAL; INFILTRATION; INTRACAUDAL; PERINEURAL ONCE
Status: DISCONTINUED | OUTPATIENT
Start: 2024-09-19 | End: 2024-09-21 | Stop reason: HOSPADM

## 2024-09-19 RX ORDER — BUPIVACAINE HYDROCHLORIDE 5 MG/ML
15 INJECTION, SOLUTION EPIDURAL; INTRACAUDAL ONCE
Status: DISCONTINUED | OUTPATIENT
Start: 2024-09-19 | End: 2024-09-21 | Stop reason: HOSPADM

## 2024-09-19 RX ORDER — ETHYL CHLORIDE 100 %
AEROSOL, SPRAY (ML) TOPICAL PRN
Status: DISCONTINUED | OUTPATIENT
Start: 2024-09-19 | End: 2024-09-21 | Stop reason: HOSPADM

## 2024-09-19 RX ORDER — BUPIVACAINE HYDROCHLORIDE 5 MG/ML
1 INJECTION, SOLUTION EPIDURAL; INTRACAUDAL ONCE
Status: DISCONTINUED | OUTPATIENT
Start: 2024-09-19 | End: 2024-09-21 | Stop reason: HOSPADM

## 2024-09-19 RX ORDER — TRIAMCINOLONE ACETONIDE 40 MG/ML
40 INJECTION, SUSPENSION INTRA-ARTICULAR; INTRAMUSCULAR ONCE
Status: DISCONTINUED | OUTPATIENT
Start: 2024-09-19 | End: 2024-09-21 | Stop reason: HOSPADM

## 2024-10-08 DIAGNOSIS — M54.12 CERVICAL RADICULAR PAIN: ICD-10-CM

## 2024-10-08 RX ORDER — HYDROCODONE BITARTRATE AND ACETAMINOPHEN 7.5; 325 MG/1; MG/1
1 TABLET ORAL 2 TIMES DAILY PRN
Qty: 60 TABLET | Refills: 0 | Status: SHIPPED | OUTPATIENT
Start: 2024-10-11 | End: 2024-11-10

## 2024-11-07 DIAGNOSIS — M54.12 CERVICAL RADICULAR PAIN: ICD-10-CM

## 2024-11-07 DIAGNOSIS — M25.512 CHRONIC LEFT SHOULDER PAIN: ICD-10-CM

## 2024-11-07 DIAGNOSIS — G89.29 CHRONIC LEFT SHOULDER PAIN: ICD-10-CM

## 2024-11-11 RX ORDER — HYDROCODONE BITARTRATE AND ACETAMINOPHEN 7.5; 325 MG/1; MG/1
1 TABLET ORAL 2 TIMES DAILY PRN
Qty: 60 TABLET | Refills: 0 | Status: SHIPPED | OUTPATIENT
Start: 2024-11-11 | End: 2024-12-11

## 2024-11-25 ENCOUNTER — HOSPITAL ENCOUNTER (OUTPATIENT)
Dept: GENERAL RADIOLOGY | Age: 89
Discharge: HOME | End: 2024-11-25
Payer: MEDICARE

## 2024-11-25 ENCOUNTER — HOSPITAL ENCOUNTER (OUTPATIENT)
Dept: PAIN MANAGEMENT | Age: 89
Discharge: HOME | End: 2024-11-25
Payer: MEDICARE

## 2024-11-25 VITALS
RESPIRATION RATE: 16 BRPM | SYSTOLIC BLOOD PRESSURE: 114 MMHG | DIASTOLIC BLOOD PRESSURE: 65 MMHG | TEMPERATURE: 98 F | HEART RATE: 82 BPM

## 2024-11-25 DIAGNOSIS — G89.29 CHRONIC BILATERAL LOW BACK PAIN WITHOUT SCIATICA: ICD-10-CM

## 2024-11-25 DIAGNOSIS — M54.12 CERVICAL RADICULAR PAIN: ICD-10-CM

## 2024-11-25 DIAGNOSIS — G89.29 CHRONIC LEFT SHOULDER PAIN: ICD-10-CM

## 2024-11-25 DIAGNOSIS — G89.29 CHRONIC BILATERAL LOW BACK PAIN WITHOUT SCIATICA: Primary | ICD-10-CM

## 2024-11-25 DIAGNOSIS — M25.512 CHRONIC LEFT SHOULDER PAIN: ICD-10-CM

## 2024-11-25 DIAGNOSIS — M54.50 CHRONIC BILATERAL LOW BACK PAIN WITHOUT SCIATICA: ICD-10-CM

## 2024-11-25 DIAGNOSIS — M54.50 CHRONIC BILATERAL LOW BACK PAIN WITHOUT SCIATICA: Primary | ICD-10-CM

## 2024-11-25 DIAGNOSIS — M79.18 MYOFASCIAL MUSCLE PAIN: ICD-10-CM

## 2024-11-25 PROCEDURE — 72110 X-RAY EXAM L-2 SPINE 4/>VWS: CPT

## 2024-11-25 PROCEDURE — 99214 OFFICE O/P EST MOD 30 MIN: CPT

## 2024-11-25 RX ORDER — GABAPENTIN 100 MG/1
100 CAPSULE ORAL 2 TIMES DAILY
Qty: 180 CAPSULE | Refills: 0 | Status: SHIPPED | OUTPATIENT
Start: 2025-02-09 | End: 2025-05-10

## 2024-11-25 RX ORDER — HYDROCODONE BITARTRATE AND ACETAMINOPHEN 7.5; 325 MG/1; MG/1
1 TABLET ORAL 2 TIMES DAILY PRN
Qty: 60 TABLET | Refills: 0 | Status: SHIPPED | OUTPATIENT
Start: 2024-12-10 | End: 2025-01-09

## 2024-11-25 ASSESSMENT — PAIN DESCRIPTION - ORIENTATION
ORIENTATION_2: LOWER
ORIENTATION: LOWER

## 2024-11-25 ASSESSMENT — ENCOUNTER SYMPTOMS
RESPIRATORY NEGATIVE: 1
BACK PAIN: 1
EYES NEGATIVE: 1
BOWEL INCONTINENCE: 0
GASTROINTESTINAL NEGATIVE: 1

## 2024-11-25 ASSESSMENT — PAIN SCALES - GENERAL: PAINLEVEL_OUTOF10: 8

## 2024-11-25 ASSESSMENT — PAIN DESCRIPTION - PAIN TYPE: TYPE: CHRONIC PAIN

## 2024-11-25 ASSESSMENT — PAIN DESCRIPTION - LOCATION
LOCATION_2: NECK
LOCATION: BACK
LOCATION_3: SHOULDER

## 2024-11-25 ASSESSMENT — PAIN DESCRIPTION - INTENSITY
RATING_2: 5
RATING_3: 5

## 2024-11-25 NOTE — PROGRESS NOTES
Clinic Documentation      Education Provided:  [x] Review of Fritz  [] Agreement Review  [x] PEG Score Calculated [] PHQ Score Calculated [] ORT Score Calculated    [] Compliance Issues Discussed [] Cognitive Behavior Needs [x] Exercise [] Review of Test [] Financial Issues  [x] Tobacco/Alcohol Use Reviewed [x] Teaching [] New Patient [] Picture Obtained    Physician Plan:  [] Outgoing Referral  [] Pharmacy Consult  [x] Test Ordered [x] Prescription Ordered/Changed   [] Obtained Test Results / Consult Notes        Complete if patient is withholding blood thinner for procedure     Blood Thinner Patient is currently taking:      [] Plavix (Hold for 7 days)  [] Aspirin (Hold for 5 days)     [] Pletal (Hold for 2 days)  [] Pradaxa (Hold for 3 days)    [] Effient (Hold for 7 days)  [] Xarelto (Hold for 2 days)    [] Eliquis (Hold for 2 days)  [] Brilinta (Hold for 7 days)    [] Coumadin (Hold for 5 days) - (INR needs to be drawn the day prior to procedure- INR < 2.0)    [] Aggrenox (Hold for 7 days)        [] Patient will stop medication on their own.    [] Blood Thinner Form Faxed for approval to hold.   Provider form faxed to:     Assessment Completed by:  Electronically signed by Kelly Ashby MA on 11/25/2024 at 12:08 PM  
individualizing care.         [x] Follow up    [] 4 weeks   [] 6 weeks   [] 8 weeks   [] 10 weeks   [x] 3 months  [x] Post procedure to evaluate effectiveness of treatment  [] To evaluate medications changes made at office visit.   [x] To review diagnostics ordered at last visit.   [] To evaluate response to therapy    [x] For management of controlled substance  [x] Random UDS as indicated by ORT score or if indicated by abberent behaviors     Discussion: Discussed exam findings, reviewed imaging no, and gave education regarding pathophysiology, exercise, and treatment options no, and reviewed plan of care with patient. Strongly reinforced that exercise is exteremly important part of pain management. Exercises should be completed upon awaking and before bed. Patient agreed with POC and questions were asked and answered. See DC instructions.     Activity: discussed exercise as beneficial to pain reduction, encouraged stretching exercise at least twice daily with a focus on torso (core) strengthening.    Goal:  Pain Management Goals of Therapy:   [] Resolution in pain  [x] Decrease in pain level  [x] Improvement in ADL's  [x] Increase in activities with less pain  [] Decrease in medication     Controlled substance monitoring:    [x] Discussed medication side effects, risk of tolerance and/or dependence, and/or alternative treatment  [] Discussed the detrimental effects of long term narcotic use in younger patients especially women of childbearing years.  [x] No signs and symptoms of potential drug abuse or diversion were identified  [] Signs of potential drug abuse or diversion were identified   [] ORT Score   [] UDS non-compliant   [] See Note  [] Random urine drug screen sent today  [x] Random urine drug screen not completed today   [] Deferred New Patient  [x] Compliant 4/25/2024  [] Not Compliant see note  [] Medication agreement with provider signed today 8/13/2024  [x] Medication agreement with provider on file

## 2024-11-25 NOTE — TELEPHONE ENCOUNTER
1. Chronic left shoulder pain    2. Cervical radicular pain      Requested Prescriptions     Pending Prescriptions Disp Refills    diclofenac (VOLTAREN) 50 MG EC tablet 180 tablet 0     Sig: Take 1 tablet by mouth 2 times daily (with meals)    gabapentin (NEURONTIN) 100 MG capsule 180 capsule 0     Sig: Take 1 capsule by mouth 2 times daily for 90 days. May fill 2/9/2024    HYDROcodone-acetaminophen (NORCO) 7.5-325 MG per tablet 60 tablet 0     Sig: Take 1 tablet by mouth 2 times daily as needed for Pain for up to 30 days. May fill 12/10/24       Continue medication with refill sent at appointment yes; refill sent to medical director at appointment no, see refill encounter dated 11/25/2024    Electronically signed by REJI Isabel CNP on 11/25/2024 at 11:09 AM

## 2024-12-19 ENCOUNTER — HOSPITAL ENCOUNTER (OUTPATIENT)
Dept: PAIN MANAGEMENT | Age: 89
Discharge: HOME | End: 2024-12-19
Payer: MEDICARE

## 2024-12-19 VITALS
TEMPERATURE: 96.3 F | SYSTOLIC BLOOD PRESSURE: 131 MMHG | DIASTOLIC BLOOD PRESSURE: 76 MMHG | HEART RATE: 95 BPM | OXYGEN SATURATION: 94 % | RESPIRATION RATE: 18 BRPM

## 2024-12-19 DIAGNOSIS — M19.019 ARTHRITIS OF SHOULDER: Primary | ICD-10-CM

## 2024-12-19 DIAGNOSIS — M62.830 MUSCLE SPASM OF BACK: ICD-10-CM

## 2024-12-19 DIAGNOSIS — M25.512 CHRONIC LEFT SHOULDER PAIN: ICD-10-CM

## 2024-12-19 DIAGNOSIS — G89.29 CHRONIC LEFT SHOULDER PAIN: ICD-10-CM

## 2024-12-19 DIAGNOSIS — M79.18 MYALGIA, MULTIPLE SITES: ICD-10-CM

## 2024-12-19 DIAGNOSIS — M62.838 MUSCLE SPASMS OF NECK: ICD-10-CM

## 2024-12-19 PROCEDURE — 20553 NJX 1/MLT TRIGGER POINTS 3/>: CPT

## 2024-12-19 PROCEDURE — 20611 DRAIN/INJ JOINT/BURSA W/US: CPT

## 2024-12-19 PROCEDURE — 6360000002 HC RX W HCPCS

## 2024-12-19 RX ORDER — ETHYL CHLORIDE 100 %
AEROSOL, SPRAY (ML) TOPICAL PRN
Status: DISCONTINUED | OUTPATIENT
Start: 2024-12-19 | End: 2024-12-21 | Stop reason: HOSPADM

## 2024-12-19 RX ORDER — BUPIVACAINE HYDROCHLORIDE 5 MG/ML
10 INJECTION, SOLUTION EPIDURAL; INTRACAUDAL ONCE
Status: DISCONTINUED | OUTPATIENT
Start: 2024-12-19 | End: 2024-12-21 | Stop reason: HOSPADM

## 2024-12-19 RX ORDER — LIDOCAINE HYDROCHLORIDE 10 MG/ML
10 INJECTION, SOLUTION EPIDURAL; INFILTRATION; INTRACAUDAL; PERINEURAL ONCE
Status: DISCONTINUED | OUTPATIENT
Start: 2024-12-19 | End: 2024-12-21 | Stop reason: HOSPADM

## 2024-12-19 RX ORDER — TRIAMCINOLONE ACETONIDE 40 MG/ML
40 INJECTION, SUSPENSION INTRA-ARTICULAR; INTRAMUSCULAR ONCE
Status: DISCONTINUED | OUTPATIENT
Start: 2024-12-19 | End: 2024-12-21 | Stop reason: HOSPADM

## 2024-12-19 NOTE — PROCEDURES
PROCEDURE NOTE  Date: 2024   Name: Addison Boothe  YOB: 1935    Procedures  [facility] Physical & Pain Medicine    Patient Name: Addison Boothe    : 1935    Age: 89 y.o.    Sex: male    Date: 2024    Preop Diagnosis: Arthritis of  left Shoulder(s)    Postop Diagnosis: Arthritis of left Shoulder(s)    Procedure: Steroid Injection of left  Shoulder(s) with US confirmation     Performing Procedure:  Ning MENDOZA    Patient Vitals for the past 24 hrs:   BP Temp Temp src Pulse Resp SpO2   24 1057 131/76 (!) 96.3 °F (35.7 °C) Temporal 95 18 94 %       Description of Procedure:  After a brief physical assessment and failure to improve with conservative measures the patient presented for an injection of the left Shoulder Joint(s). The indications, limitations and possible complications were discussed with the patient and the patient elected to proceed with the procedure.    left Joint    After voluntary, informed and signed consent obtained the patient was placed in a seated position. Appropriate time out was obtained per policy. The patient was placed in a seated position with affected arm placed to the side and elbow flexed at 90 degrees. The Shoulder joint(s) was palpated for maximal tenderness. The area was marked for posterior approach. The ultrasound was  used to confirm correct location. The skin prepped in an aseptic fashion with CHG swab and then sprayed with Gebauer's Solution. Under aseptic technique 22 gauge 1 1/2 inch needle was then introduced into the shoulder(s). After a negative aspiration a solution of 1 ml of 0.5% Marcaine Plain, 1 ml of 1% Lidocaine Plain and 1 ml of Kenalog (40mg/ml) was injected into the Shoulder(s). The needle was withdrawn and a sterile dressing applied. If this was a bilateral procedure the same steps were followed on the opposite side.         [facility]    Patient Name:

## 2024-12-19 NOTE — PROGRESS NOTES
Procedure:  Level of Consciousness: [x]Alert [x]Oriented []Disoriented []Lethargic  Anxiety Level: [x]Calm []Anxious []Depressed []Other  Skin: [x]Warm []Dry []Cool []Moist []Intact []Other  Cardiovascular: []Palpitations: [x]Never []Occasionally []Frequently  Chest Pain: [x]No []Yes  Respiratory:  [x]Unlabored []Labored []Cough ([] Productive []Unproductive)  HCG Required: []No []Yes   Results: []Negative []Positive  Knowledge Level:        [x]Patient/Other verbalized understanding of pre-procedure instructions.        [x]Assessment of post-op care needs (transportation, responsible caregiver)        [x]Able to discuss health care problems and how to deal with it.  Factors that Affect Teaching:        Language Barrier: []No []Yes - why:        Hearing Loss:        []No [x]Yes            Corrective Device:  [x]Yes []No        Vision Loss:           [x]No []Yes            Corrective Device:  []Yes [x]No        Memory Loss:       [x]No []Yes            []Short Term []Long Term  Motivational Level:  [x]Asks Questions                  []Extremely Anxious       [x]Seems Interested               []Seems Uninterested                  [x]Denies need for Education  Risk for Injury:  [x]Patient oriented to person, place and time  []History of frequent falls/loss of balance  Nutritional:  []Change in appetite   []Weight Gain   []Weight Loss  Functional:  []Requires assistance with ADL's

## 2024-12-19 NOTE — DISCHARGE INSTRUCTIONS
[facility]  Post Procedure Discharge Instructions        YOU HAVE HAD THE FOLLOWING PROCEDURE:                                  [] Occipital Nerve Blocks  [] CTS wrist injection(s)  [] Knee Injection(s)         [x] Shoulder Injection(s)   [] Elbow Injection(s)     [] Botox Injection  [x] Cervical Trigger Point Injections    [] Thoracic Trigger Point Injections    [x] Lumbar Trigger Point Injections  [] Piriformis Trigger Point Injections  [] SI Joint Injection(s)     [] Trochanteric Bursa Injection(s)       [] Ankle Injection(s)   [] Plantar Fasciitis   []  ______________  Injection(s) [] Botox []  Migraines [] Spasticity    YOU HAVE RECEIVED THE FOLLOWING MEDICATIONS IN YOUR INJECTION(s)  [x] Lidocaine [x] Bupivacaine   [] DepoMedrol (steroid) [] Decadron (steroid)  [x]  Kenalog (steroid)   [] Toradol  [] Supartz [] Zilretta    [] Botox        PATIENT INFORMATION:   You may experience the following symptoms after your procedure. These symptoms are normal and should not cause concern:    You may have an increase in your pain. This may last 24 - 48 hours after your procedure.  You may have no change in the pain that you had prior to your injection(s).  You may have weakness or numbness in your affected extremity. If this occurs, this may last until numbing the medication wears off.     REPORT THE FOLLOWING SYMPTOMS TO YOUR DOCTOR:  Redness, swelling or drainage at the injection site(s)  Unusual pain that interferes with your normal activities of daily living.    OTHER INSTRUCTIONS:    [x] I will apply ice to the injection site(s) for at least 24 hours after the procedure. I will rotate the ice on for 20 minutes and off for 20 minutes for at least 24 hours.    [x] I will not apply heat for at least 48 hours and I will not take a hot bath or shower for at least 24 hours.     [x] I understand that if Lidocaine or Bupivacaine was used in my injection(s) that the injection

## 2024-12-30 ENCOUNTER — TELEPHONE (OUTPATIENT)
Dept: PAIN MANAGEMENT | Age: 89
End: 2024-12-30

## 2024-12-30 NOTE — TELEPHONE ENCOUNTER
How much -percent wise- did the shot help?    25   %  What is your current pain level now?  Probably would say in the middle-5/10                       Has your activity level increased since the shot?   no     Follow up call placed, talked with pts daughter, as pt has trouble with answering these questions.  Pt's daughter said he felt better at 1st, like a few days after, but the injections don't tend to last long.  She also states that his activity level didn't increase at all after his 12/19 injections.

## 2025-01-07 DIAGNOSIS — M54.12 CERVICAL RADICULAR PAIN: ICD-10-CM

## 2025-01-07 NOTE — TELEPHONE ENCOUNTER
Last seen in office visit: 11/25/24  Next scheduled office visit: 4/2/25 with Ning.    Last UDS results: compliant  Any discrepancies on Fritz (such as refills from another provider): none

## 2025-01-08 RX ORDER — HYDROCODONE BITARTRATE AND ACETAMINOPHEN 7.5; 325 MG/1; MG/1
1 TABLET ORAL 2 TIMES DAILY PRN
Qty: 60 TABLET | Refills: 0 | Status: SHIPPED | OUTPATIENT
Start: 2025-01-09 | End: 2025-02-08

## 2025-01-09 ENCOUNTER — TELEPHONE (OUTPATIENT)
Dept: PAIN MANAGEMENT | Age: 89
End: 2025-01-09

## 2025-02-06 DIAGNOSIS — M25.512 CHRONIC LEFT SHOULDER PAIN: ICD-10-CM

## 2025-02-06 DIAGNOSIS — G89.29 CHRONIC LEFT SHOULDER PAIN: ICD-10-CM

## 2025-02-06 DIAGNOSIS — M54.12 CERVICAL RADICULAR PAIN: ICD-10-CM

## 2025-02-06 NOTE — TELEPHONE ENCOUNTER
Last seen in office visit: 11/25/25  Next scheduled office visit: 4/2/25 with Ning  Last UDS results: compliant  Any discrepancies on Fritz (such as refills from another provider): none

## 2025-02-10 RX ORDER — HYDROCODONE BITARTRATE AND ACETAMINOPHEN 7.5; 325 MG/1; MG/1
1 TABLET ORAL 2 TIMES DAILY PRN
Qty: 60 TABLET | Refills: 0 | Status: SHIPPED | OUTPATIENT
Start: 2025-02-10 | End: 2025-03-12

## 2025-03-10 DIAGNOSIS — M54.12 CERVICAL RADICULAR PAIN: ICD-10-CM

## 2025-03-10 RX ORDER — HYDROCODONE BITARTRATE AND ACETAMINOPHEN 7.5; 325 MG/1; MG/1
1 TABLET ORAL 2 TIMES DAILY PRN
Qty: 60 TABLET | Refills: 0 | Status: SHIPPED | OUTPATIENT
Start: 2025-03-12 | End: 2025-04-11

## 2025-03-10 NOTE — TELEPHONE ENCOUNTER
Patient called on 3/7/25.  The office is closed on Fridays.    Last seen in office visit: 11/25/24  Next scheduled office visit: 4/2/25 with Ning Jiang UDS results: compliant  Any discrepancies on Fritz (such as refills from another provider): none

## 2025-04-02 ENCOUNTER — OFFICE VISIT (OUTPATIENT)
Dept: PAIN MANAGEMENT | Age: 89
End: 2025-04-02
Payer: MEDICARE

## 2025-04-02 VITALS
OXYGEN SATURATION: 93 % | DIASTOLIC BLOOD PRESSURE: 76 MMHG | WEIGHT: 190 LBS | BODY MASS INDEX: 25.73 KG/M2 | HEIGHT: 72 IN | TEMPERATURE: 97.1 F | HEART RATE: 100 BPM | RESPIRATION RATE: 18 BRPM | SYSTOLIC BLOOD PRESSURE: 132 MMHG

## 2025-04-02 DIAGNOSIS — M19.012 ARTHRITIS OF LEFT SHOULDER: ICD-10-CM

## 2025-04-02 DIAGNOSIS — Z51.81 ENCOUNTER FOR MONITORING OPIOID MAINTENANCE THERAPY: ICD-10-CM

## 2025-04-02 DIAGNOSIS — G89.29 CHRONIC BILATERAL LOW BACK PAIN WITHOUT SCIATICA: Primary | ICD-10-CM

## 2025-04-02 DIAGNOSIS — M54.2 CERVICALGIA: ICD-10-CM

## 2025-04-02 DIAGNOSIS — G89.29 CHRONIC LEFT SHOULDER PAIN: ICD-10-CM

## 2025-04-02 DIAGNOSIS — M54.50 CHRONIC BILATERAL LOW BACK PAIN WITHOUT SCIATICA: Primary | ICD-10-CM

## 2025-04-02 DIAGNOSIS — M79.18 MYALGIA, MULTIPLE SITES: ICD-10-CM

## 2025-04-02 DIAGNOSIS — Z79.891 ENCOUNTER FOR MONITORING OPIOID MAINTENANCE THERAPY: ICD-10-CM

## 2025-04-02 DIAGNOSIS — M25.512 CHRONIC LEFT SHOULDER PAIN: ICD-10-CM

## 2025-04-02 DIAGNOSIS — M54.12 CERVICAL RADICULAR PAIN: ICD-10-CM

## 2025-04-02 PROCEDURE — 1159F MED LIST DOCD IN RCRD: CPT

## 2025-04-02 PROCEDURE — G8427 DOCREV CUR MEDS BY ELIG CLIN: HCPCS

## 2025-04-02 PROCEDURE — 99214 OFFICE O/P EST MOD 30 MIN: CPT

## 2025-04-02 PROCEDURE — 1125F AMNT PAIN NOTED PAIN PRSNT: CPT

## 2025-04-02 PROCEDURE — G8419 CALC BMI OUT NRM PARAM NOF/U: HCPCS

## 2025-04-02 PROCEDURE — 1123F ACP DISCUSS/DSCN MKR DOCD: CPT

## 2025-04-02 PROCEDURE — 4004F PT TOBACCO SCREEN RCVD TLK: CPT

## 2025-04-02 PROCEDURE — 1160F RVW MEDS BY RX/DR IN RCRD: CPT

## 2025-04-02 RX ORDER — GABAPENTIN 100 MG/1
100 CAPSULE ORAL 2 TIMES DAILY
Qty: 180 CAPSULE | Refills: 0 | Status: SHIPPED | OUTPATIENT
Start: 2025-04-02 | End: 2025-07-01

## 2025-04-02 RX ORDER — HYDROCODONE BITARTRATE AND ACETAMINOPHEN 7.5; 325 MG/1; MG/1
1 TABLET ORAL 2 TIMES DAILY PRN
Qty: 60 TABLET | Refills: 0 | Status: SHIPPED | OUTPATIENT
Start: 2025-04-11 | End: 2025-05-11

## 2025-04-02 ASSESSMENT — PATIENT HEALTH QUESTIONNAIRE - PHQ9
1. LITTLE INTEREST OR PLEASURE IN DOING THINGS: SEVERAL DAYS
SUM OF ALL RESPONSES TO PHQ QUESTIONS 1-9: 1
2. FEELING DOWN, DEPRESSED OR HOPELESS: NOT AT ALL

## 2025-04-02 ASSESSMENT — ENCOUNTER SYMPTOMS
BACK PAIN: 1
EYES NEGATIVE: 1
RESPIRATORY NEGATIVE: 1
GASTROINTESTINAL NEGATIVE: 1

## 2025-04-02 NOTE — PROGRESS NOTES
oriented to person, place, and time. Mental status is at baseline.      Deep Tendon Reflexes: Reflexes are normal and symmetric.      Reflex Scores:       Tricep reflexes are 2+ on the right side and 2+ on the left side.       Bicep reflexes are 2+ on the right side and 2+ on the left side.       Brachioradialis reflexes are 2+ on the right side and 2+ on the left side.       Patellar reflexes are 2+ on the right side and 2+ on the left side.       Achilles reflexes are 2+ on the right side and 2+ on the left side.       Physical Exam       Assistive Devices: none    LABS:   No results found for: \"LABA1C\"    No results found for: \"NA\", \"K\", \"CL\", \"CO2\", \"BUN\", \"CREATININE\", \"GLUCOSE\", \"CALCIUM\"     No results found for: \"WBC\", \"HGB\", \"HCT\", \"MCV\", \"PLT\"    Urine Drug Screenings (1 yr)    No resulted procedures found.          ASSESSMENT AND PLAN:    1. Chronic bilateral low back pain without sciatica  2. Cervicalgia  3. Cervical radicular pain  -     HYDROcodone-acetaminophen (NORCO) 7.5-325 MG per tablet; Take 1 tablet by mouth 2 times daily as needed for Pain for up to 30 days. May fill 4/11/25, Disp-60 tablet, R-0Normal  -     gabapentin (NEURONTIN) 100 MG capsule; Take 1 capsule by mouth 2 times daily for 90 days. May fill 2/9/2024, Disp-180 capsule, R-0Normal  4. Chronic left shoulder pain  -     diclofenac sodium (VOLTAREN) 1 % GEL; Apply 2 g topically 4 times daily as needed for Pain, Topical, 4 TIMES DAILY PRN Starting Wed 4/2/2025, Disp-240 g, R-2, Normal  -     diclofenac (VOLTAREN) 50 MG EC tablet; Take 1 tablet by mouth 2 times daily (with meals), Disp-180 tablet, R-0Normal  5. Myalgia, multiple sites  Overview:  Replacing Deprecated Diagnoses    6. Arthritis of left shoulder  7. Encounter for monitoring opioid maintenance therapy  -     Drug SCRN, Buprenorphine; Future    - Schedule Bilateral Cervical / Lumbar Trigger Point Injections ( 3 or more muscles)  and Left Shoulder Injection with Kenalog under

## 2025-04-25 DIAGNOSIS — Z79.891 ENCOUNTER FOR MONITORING OPIOID MAINTENANCE THERAPY: ICD-10-CM

## 2025-04-25 DIAGNOSIS — Z51.81 ENCOUNTER FOR MONITORING OPIOID MAINTENANCE THERAPY: ICD-10-CM

## 2025-05-01 ENCOUNTER — HOSPITAL ENCOUNTER (OUTPATIENT)
Dept: PAIN MANAGEMENT | Age: 89
Discharge: HOME | End: 2025-05-01
Payer: MEDICARE

## 2025-05-01 VITALS
TEMPERATURE: 96.6 F | SYSTOLIC BLOOD PRESSURE: 141 MMHG | OXYGEN SATURATION: 95 % | DIASTOLIC BLOOD PRESSURE: 67 MMHG | HEART RATE: 80 BPM | RESPIRATION RATE: 16 BRPM

## 2025-05-01 DIAGNOSIS — M62.838 MUSCLE SPASMS OF NECK: ICD-10-CM

## 2025-05-01 DIAGNOSIS — M62.830 MUSCLE SPASM OF BACK: ICD-10-CM

## 2025-05-01 DIAGNOSIS — M19.012 ARTHRITIS OF LEFT SHOULDER: ICD-10-CM

## 2025-05-01 DIAGNOSIS — M79.18 MYALGIA, MULTIPLE SITES: Primary | ICD-10-CM

## 2025-05-01 DIAGNOSIS — M25.512 CHRONIC LEFT SHOULDER PAIN: ICD-10-CM

## 2025-05-01 DIAGNOSIS — G89.29 CHRONIC LEFT SHOULDER PAIN: ICD-10-CM

## 2025-05-01 PROCEDURE — 20611 DRAIN/INJ JOINT/BURSA W/US: CPT

## 2025-05-01 PROCEDURE — 20553 NJX 1/MLT TRIGGER POINTS 3/>: CPT

## 2025-05-01 PROCEDURE — 6360000002 HC RX W HCPCS

## 2025-05-01 RX ORDER — TRIAMCINOLONE ACETONIDE 40 MG/ML
40 INJECTION, SUSPENSION INTRA-ARTICULAR; INTRAMUSCULAR ONCE
Status: DISCONTINUED | OUTPATIENT
Start: 2025-05-01 | End: 2025-05-03 | Stop reason: HOSPADM

## 2025-05-01 RX ORDER — LIDOCAINE HYDROCHLORIDE 10 MG/ML
15 INJECTION, SOLUTION EPIDURAL; INFILTRATION; INTRACAUDAL; PERINEURAL ONCE
Status: DISCONTINUED | OUTPATIENT
Start: 2025-05-01 | End: 2025-05-03 | Stop reason: HOSPADM

## 2025-05-01 RX ORDER — ETHYL CHLORIDE 100 %
AEROSOL, SPRAY (ML) TOPICAL PRN
Status: DISCONTINUED | OUTPATIENT
Start: 2025-05-01 | End: 2025-05-03 | Stop reason: HOSPADM

## 2025-05-01 RX ORDER — BUPIVACAINE HYDROCHLORIDE 5 MG/ML
15 INJECTION, SOLUTION EPIDURAL; INTRACAUDAL; PERINEURAL ONCE
Status: DISCONTINUED | OUTPATIENT
Start: 2025-05-01 | End: 2025-05-03 | Stop reason: HOSPADM

## 2025-05-01 NOTE — PROCEDURES
PROCEDURE NOTE  Date: 2025   Name: Addison Boothe  YOB: 1935    Procedures  [facility]    Patient Name: Addison Boothe    : 1935    Age: 89 y.o.    Sex: male    Date: May 1, 2025    Preop Diagnosis: Osteoarthritis of  left Shoulder(s)    Postop Diagnosis: Osteoarthritis of left Shoulder(s)    Procedure: Steroid Injection of left  Shoulder(s) with US confirmation     Performing Procedure:  Ning Robles APRN - CNP    Patient Vitals for the past 24 hrs:   BP Temp Temp src Pulse Resp SpO2   25 1047 (!) 141/67 (!) 96.6 °F (35.9 °C) Temporal 80 16 95 %       Description of Procedure:  After a brief physical assessment and failure to improve with conservative measures the patient presented for an injection of the left Shoulder Joint(s). The indications, limitations and possible complications were discussed with the patient and the patient elected to proceed with the procedure.    left Joint    After voluntary, informed and signed consent obtained the patient was placed in a seated position. Appropriate time out was obtained per policy. The patient was placed in a seated position with affected arm placed to the side and elbow flexed at 90 degrees. The Shoulder joint(s) was palpated for maximal tenderness. The area was marked for posterior approach. The ultrasound was  used to confirm correct location. The skin prepped in an aseptic fashion with CHG swab and then sprayed with Gebauer's Solution. Under aseptic technique 22 gauge 1 1/2 inch needle was then introduced into the shoulder(s). After a negative aspiration a solution of 1 ml of 0.5% Marcaine Plain, 1 ml of 1% Lidocaine Plain and 1 ml of Kenalog (40mg/ml) was injected into the Shoulder(s). The needle was withdrawn and a sterile dressing applied. If this was a bilateral procedure the same steps were followed on the opposite side.         [facility] Pain

## 2025-05-01 NOTE — DISCHARGE INSTRUCTIONS
[facility] Physical [facility] Pain Medicine  Post Procedure Discharge Instructions        YOU HAVE HAD THE FOLLOWING PROCEDURE:                                  [] Occipital Nerve Blocks  [] CTS wrist injection(s)  [] Knee Injection(s)         [x] Shoulder Injection(s)   [] Elbow Injection(s)     [] Botox Injection  [x] Cervical Trigger Point Injections    [] Thoracic Trigger Point Injections    [x] Lumbar Trigger Point Injections  [] Piriformis Trigger Point Injections  [] SI Joint Injection(s)     [] Trochanteric Bursa Injection(s)       [] Ankle Injection(s)   [] Plantar Fasciitis   []  ______________  Injection(s) [] Botox []  Migraines [] Spasticity    YOU HAVE RECEIVED THE FOLLOWING MEDICATIONS IN YOUR INJECTION(s)  [x] Lidocaine [x] Bupivacaine   [] DepoMedrol (steroid) [] Decadron (steroid)  [x]  Kenalog (steroid)   [] Toradol  [] Supartz [] Zilretta    [] Botox        PATIENT INFORMATION:   You may experience the following symptoms after your procedure. These symptoms are normal and should not cause concern:    You may have an increase in your pain. This may last 24 - 48 hours after your procedure.  You may have no change in the pain that you had prior to your injection(s).  You may have weakness or numbness in your affected extremity. If this occurs, this may last until numbing the medication wears off.     REPORT THE FOLLOWING SYMPTOMS TO YOUR DOCTOR:  Redness, swelling or drainage at the injection site(s)  Unusual pain that interferes with your normal activities of daily living.    OTHER INSTRUCTIONS:    [x] I will apply ice to the injection site(s) for at least 24 hours after the procedure. I will rotate the ice on for 20 minutes and off for 20 minutes for at least 24 hours.    [x] I will not apply heat for at least 48 hours and I will not take a hot bath or shower for at least 24 hours.     [x] I understand that if Lidocaine or Bupivacaine was used in my injection(s) that the injection

## 2025-05-01 NOTE — PROGRESS NOTES
Procedure:  Level of Consciousness: [x]Alert [x]Oriented []Disoriented []Lethargic  Anxiety Level: [x]Calm []Anxious []Depressed []Other  Skin: [x]Warm [x]Dry []Cool []Moist []Intact []Other  Cardiovascular: []Palpitations: [x]Never []Occasionally []Frequently  Chest Pain: [x]No []Yes  Respiratory:  [x]Unlabored []Labored []Cough ([] Productive []Unproductive)  HCG Required: [x]No []Yes   Results: []Negative []Positive  Knowledge Level:        [x]Patient/Other verbalized understanding of pre-procedure instructions.        [x]Assessment of post-op care needs (transportation, responsible caregiver)        [x]Able to discuss health care problems and how to deal with it.  Factors that Affect Teaching:        Language Barrier: [x]No []Yes - why:        Hearing Loss:        []No [x]Yes            Corrective Device:  [x]Yes []No        Vision Loss:           [x]No []Yes            Corrective Device:  []Yes []No        Memory Loss:       [x]No []Yes            []Short Term []Long Term  Motivational Level:  [x]Asks Questions                  []Extremely Anxious       [x]Seems Interested               []Seems Uninterested                  []Denies need for Education  Risk for Injury:  [x]Patient oriented to person, place and time  []History of frequent falls/loss of balance  Nutritional:  []Change in appetite   []Weight Gain   []Weight Loss  Functional:  []Requires assistance with ADL's

## 2025-05-06 DIAGNOSIS — M54.12 CERVICAL RADICULAR PAIN: ICD-10-CM

## 2025-05-06 RX ORDER — HYDROCODONE BITARTRATE AND ACETAMINOPHEN 7.5; 325 MG/1; MG/1
1 TABLET ORAL 2 TIMES DAILY PRN
Qty: 60 TABLET | Refills: 0 | Status: SHIPPED | OUTPATIENT
Start: 2025-05-10 | End: 2025-06-09

## 2025-05-06 NOTE — TELEPHONE ENCOUNTER
Last seen in office visit: 4/2/25  Next scheduled office visit: 7/2/25 with Ning  Last UDS results: compliant  Any discrepancies on Fritz (such as refills from another provider): none

## 2025-05-08 ENCOUNTER — TELEPHONE (OUTPATIENT)
Dept: PAIN MANAGEMENT | Age: 89
End: 2025-05-08

## 2025-05-08 NOTE — TELEPHONE ENCOUNTER
Placed follow up call regarding pt's cervical and lumbar trigger point injections administered on 5/1/25  How much did the shot help?         80   %   What is your current pain level now?    3/10                     Has your activity level increased since the shot? Yes  Electronically signed by Jacqui Beltran RN on 5/8/2025 at 2:57 PM

## 2025-05-29 DIAGNOSIS — M54.12 CERVICAL RADICULAR PAIN: ICD-10-CM

## 2025-05-29 RX ORDER — HYDROCODONE BITARTRATE AND ACETAMINOPHEN 7.5; 325 MG/1; MG/1
1 TABLET ORAL 2 TIMES DAILY PRN
Qty: 60 TABLET | Refills: 0 | Status: SHIPPED | OUTPATIENT
Start: 2025-06-10 | End: 2025-07-10

## 2025-07-02 ENCOUNTER — OFFICE VISIT (OUTPATIENT)
Dept: PAIN MANAGEMENT | Age: 89
End: 2025-07-02
Payer: MEDICARE

## 2025-07-02 VITALS
BODY MASS INDEX: 24.92 KG/M2 | SYSTOLIC BLOOD PRESSURE: 130 MMHG | RESPIRATION RATE: 18 BRPM | HEIGHT: 72 IN | TEMPERATURE: 97.3 F | DIASTOLIC BLOOD PRESSURE: 75 MMHG | OXYGEN SATURATION: 91 % | HEART RATE: 87 BPM | WEIGHT: 184 LBS

## 2025-07-02 DIAGNOSIS — M25.512 CHRONIC LEFT SHOULDER PAIN: ICD-10-CM

## 2025-07-02 DIAGNOSIS — G89.29 CHRONIC LEFT SHOULDER PAIN: ICD-10-CM

## 2025-07-02 DIAGNOSIS — M54.9 CHRONIC NECK AND BACK PAIN: Primary | ICD-10-CM

## 2025-07-02 DIAGNOSIS — M54.2 CHRONIC NECK AND BACK PAIN: Primary | ICD-10-CM

## 2025-07-02 DIAGNOSIS — M54.12 CERVICAL RADICULAR PAIN: ICD-10-CM

## 2025-07-02 DIAGNOSIS — G89.29 CHRONIC NECK AND BACK PAIN: Primary | ICD-10-CM

## 2025-07-02 PROCEDURE — G8427 DOCREV CUR MEDS BY ELIG CLIN: HCPCS

## 2025-07-02 PROCEDURE — 4004F PT TOBACCO SCREEN RCVD TLK: CPT

## 2025-07-02 PROCEDURE — 1160F RVW MEDS BY RX/DR IN RCRD: CPT

## 2025-07-02 PROCEDURE — G8420 CALC BMI NORM PARAMETERS: HCPCS

## 2025-07-02 PROCEDURE — 99214 OFFICE O/P EST MOD 30 MIN: CPT

## 2025-07-02 PROCEDURE — 1125F AMNT PAIN NOTED PAIN PRSNT: CPT

## 2025-07-02 PROCEDURE — 1123F ACP DISCUSS/DSCN MKR DOCD: CPT

## 2025-07-02 PROCEDURE — 1159F MED LIST DOCD IN RCRD: CPT

## 2025-07-02 RX ORDER — HYDROCODONE BITARTRATE AND ACETAMINOPHEN 7.5; 325 MG/1; MG/1
1 TABLET ORAL 2 TIMES DAILY PRN
Qty: 60 TABLET | Refills: 0 | Status: CANCELLED | OUTPATIENT
Start: 2025-07-02 | End: 2025-08-01

## 2025-07-02 RX ORDER — GABAPENTIN 100 MG/1
100 CAPSULE ORAL 2 TIMES DAILY
Qty: 180 CAPSULE | Refills: 0 | Status: CANCELLED | OUTPATIENT
Start: 2025-07-02 | End: 2025-09-30

## 2025-07-02 RX ORDER — GABAPENTIN 300 MG/1
300 CAPSULE ORAL EVERY 12 HOURS
Qty: 60 CAPSULE | Refills: 0 | Status: SHIPPED | OUTPATIENT
Start: 2025-07-02 | End: 2025-08-01

## 2025-07-02 RX ORDER — HYDROCODONE BITARTRATE AND ACETAMINOPHEN 10; 325 MG/1; MG/1
1 TABLET ORAL 2 TIMES DAILY PRN
Qty: 60 TABLET | Refills: 0 | Status: SHIPPED | OUTPATIENT
Start: 2025-07-10 | End: 2025-08-09

## 2025-07-02 ASSESSMENT — ENCOUNTER SYMPTOMS
GASTROINTESTINAL NEGATIVE: 1
BACK PAIN: 1
RESPIRATORY NEGATIVE: 1
EYES NEGATIVE: 1

## 2025-07-02 NOTE — PROGRESS NOTES
[facility]  [address]  [place] [address]  Phone: [phone]  Fax: [phone]    Follow Up Office Visit    Patient Name: Addison Boothe    MR #: [id number]    Account #:    : 1935    Age: 89 y.o.    Sex: male    Date: 2025     PCP: Kiki Bellamy APRN - NP    Chief Complaint:   Chief Complaint   Patient presents with    Follow-up    Back Pain     6/10    Neck Pain     6/10    Shoulder Pain     Bilateral, 6/10       History of Present Illness:   History of Present Illness  The patient presents for a 3-month follow-up to assess the management of chronic back, neck, and shoulder pain. He rates his pain as 6 out of 10 today, noting that it feels like an eight or nine on someone else's scale.     Current treatment includes cervical and lumbar trigger point injections in the shoulders and medication. He reports that while the treatment provides temporary relief, it does not have a lasting effect. Despite this, he finds the treatment beneficial in maintaining his activity levels. 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     His daily routine includes taking one Norco 7.5 mg every morning and afternoon, along with gabapentin 100 mg. He reports that his ability to stay active has decreased over the past couple of months, with shorter durations of activity before experiencing discomfort.       Results         Current MME:  20 MME    opioid medication: Hydrocodone    1.) How is the patient taking their opioid medication: 2 times daily    2.) Is it improving the pain and function of the patient? yes  3.) What is the patient able to do specifically that they would not be able to do without the opioid medication. ADLs  4.) Is the patient having any side effects from opioids? no  5.) All patients on MME > 50,

## (undated) DEVICE — TRAP SXN POLYP QUICKCATCH LF

## (undated) DEVICE — Device: Brand: DISPOSABLE ELECTROSURGICAL SNARE

## (undated) DEVICE — CANN SMPL SOFTECH BIFLO ETCO2 A/M 7FT